# Patient Record
Sex: FEMALE | Race: WHITE | NOT HISPANIC OR LATINO | Employment: OTHER
[De-identification: names, ages, dates, MRNs, and addresses within clinical notes are randomized per-mention and may not be internally consistent; named-entity substitution may affect disease eponyms.]

---

## 2017-08-02 ENCOUNTER — RX ONLY (RX ONLY)
Age: 67
End: 2017-08-02

## 2017-08-02 ENCOUNTER — DOCTOR'S OFFICE (OUTPATIENT)
Dept: URBAN - NONMETROPOLITAN AREA CLINIC 1 | Facility: CLINIC | Age: 67
Setting detail: OPHTHALMOLOGY
End: 2017-08-02
Payer: COMMERCIAL

## 2017-08-02 DIAGNOSIS — H25.013: ICD-10-CM

## 2017-08-02 DIAGNOSIS — H04.121: ICD-10-CM

## 2017-08-02 DIAGNOSIS — H04.122: ICD-10-CM

## 2017-08-02 DIAGNOSIS — H04.123: ICD-10-CM

## 2017-08-02 PROCEDURE — 92004 COMPRE OPH EXAM NEW PT 1/>: CPT | Performed by: OPHTHALMOLOGY

## 2017-08-02 PROCEDURE — 83861 MICROFLUID ANALY TEARS: CPT | Performed by: OPHTHALMOLOGY

## 2017-08-02 ASSESSMENT — REFRACTION_CURRENTRX
OD_CYLINDER: -1.50
OS_SPHERE: +0.75
OS_CYLINDER: SPH
OD_OVR_VA: 20/
OD_SPHERE: +0.50
OD_OVR_VA: 20/
OD_AXIS: 175
OS_ADD: +2.75
OS_OVR_VA: 20/
OD_ADD: +2.75
OD_VPRISM_DIRECTION: TRF
OD_OVR_VA: 20/
OS_VPRISM_DIRECTION: TRF

## 2017-08-02 ASSESSMENT — REFRACTION_MANIFEST
OS_VA1: 20/
OS_VA3: 20/
OD_VA2: 20/
OD_VA3: 20/
OD_VA3: 20/
OD_VA1: 20/
OD_VA2: 20/
OD_VA2: 20/
OU_VA: 20/
OU_VA: 20/
OD_VA1: 20/
OS_VA2: 20/
OS_VA2: 20/
OS_VA1: 20/
OS_VA3: 20/
OS_VA2: 20/
OS_VA3: 20/
OD_VA3: 20/
OS_VA1: 20/
OU_VA: 20/
OD_VA1: 20/

## 2017-08-02 ASSESSMENT — SPHEQUIV_DERIVED: OS_SPHEQUIV: 0.125

## 2017-08-02 ASSESSMENT — REFRACTION_AUTOREFRACTION
OS_AXIS: 014
OS_CYLINDER: -0.75
OD_SPHERE: PLANO
OD_AXIS: 009
OD_CYLINDER: -2.25
OS_SPHERE: +0.50

## 2017-08-02 ASSESSMENT — VISUAL ACUITY
OS_BCVA: 20/40+2
OD_BCVA: 20/30

## 2017-08-02 ASSESSMENT — SUPERFICIAL PUNCTATE KERATITIS (SPK)
OD_SPK: T
OS_SPK: T

## 2017-08-02 ASSESSMENT — CONFRONTATIONAL VISUAL FIELD TEST (CVF)
OS_FINDINGS: FULL
OD_FINDINGS: FULL

## 2017-08-28 ENCOUNTER — DOCTOR'S OFFICE (OUTPATIENT)
Dept: URBAN - METROPOLITAN AREA CLINIC 136 | Facility: CLINIC | Age: 67
Setting detail: OPHTHALMOLOGY
End: 2017-08-28

## 2017-08-28 ENCOUNTER — DOCTOR'S OFFICE (OUTPATIENT)
Dept: URBAN - NONMETROPOLITAN AREA CLINIC 1 | Facility: CLINIC | Age: 67
Setting detail: OPHTHALMOLOGY
End: 2017-08-28
Payer: COMMERCIAL

## 2017-08-28 DIAGNOSIS — H52.4: ICD-10-CM

## 2017-08-28 DIAGNOSIS — H25.012: ICD-10-CM

## 2017-08-28 PROCEDURE — CATARACT K CATARACT KIT: Performed by: OPHTHALMOLOGY

## 2017-08-28 PROCEDURE — 92136 OPHTHALMIC BIOMETRY: CPT | Performed by: OPHTHALMOLOGY

## 2017-09-07 ENCOUNTER — AMBUL SURGICAL CARE (OUTPATIENT)
Dept: URBAN - NONMETROPOLITAN AREA SURGERY 1 | Facility: SURGERY | Age: 67
Setting detail: OPHTHALMOLOGY
End: 2017-09-07
Payer: COMMERCIAL

## 2017-09-07 DIAGNOSIS — H25.012: ICD-10-CM

## 2017-09-07 DIAGNOSIS — H25.042: ICD-10-CM

## 2017-09-07 PROCEDURE — 66984 XCAPSL CTRC RMVL W/O ECP: CPT | Performed by: OPHTHALMOLOGY

## 2017-09-07 PROCEDURE — G8918 PT W/O PREOP ORDER IV AB PRO: HCPCS | Performed by: OPHTHALMOLOGY

## 2017-09-07 PROCEDURE — G8907 PT DOC NO EVENTS ON DISCHARG: HCPCS | Performed by: OPHTHALMOLOGY

## 2017-09-08 ENCOUNTER — RX ONLY (RX ONLY)
Age: 67
End: 2017-09-08

## 2017-09-08 ENCOUNTER — DOCTOR'S OFFICE (OUTPATIENT)
Dept: URBAN - NONMETROPOLITAN AREA CLINIC 1 | Facility: CLINIC | Age: 67
Setting detail: OPHTHALMOLOGY
End: 2017-09-08
Payer: COMMERCIAL

## 2017-09-08 DIAGNOSIS — H25.011: ICD-10-CM

## 2017-09-08 PROCEDURE — 99024 POSTOP FOLLOW-UP VISIT: CPT | Performed by: OPHTHALMOLOGY

## 2017-09-08 PROCEDURE — 92136 OPHTHALMIC BIOMETRY: CPT | Performed by: OPHTHALMOLOGY

## 2017-09-08 ASSESSMENT — REFRACTION_CURRENTRX
OD_OVR_VA: 20/
OD_CYLINDER: -1.50
OD_OVR_VA: 20/
OS_OVR_VA: 20/
OS_ADD: +2.75
OS_SPHERE: +0.75
OS_OVR_VA: 20/
OD_OVR_VA: 20/
OD_ADD: +2.75
OS_VPRISM_DIRECTION: TRF
OD_SPHERE: +0.50
OD_VPRISM_DIRECTION: TRF
OS_OVR_VA: 20/
OD_AXIS: 175
OS_CYLINDER: SPH

## 2017-09-08 ASSESSMENT — REFRACTION_MANIFEST
OS_VA1: 20/
OD_VA2: 20/
OS_VA1: 20/
OU_VA: 20/
OD_VA3: 20/
OU_VA: 20/
OD_VA2: 20/
OD_VA3: 20/
OS_VA3: 20/
OD_VA1: 20/
OS_VA2: 20/
OD_VA1: 20/
OS_VA2: 20/
OS_VA3: 20/
OS_VA3: 20/
OS_VA1: 20/
OU_VA: 20/
OD_VA2: 20/
OS_VA2: 20/
OD_VA3: 20/
OD_VA1: 20/

## 2017-09-08 ASSESSMENT — REFRACTION_AUTOREFRACTION
OS_SPHERE: +0.75
OS_AXIS: 016
OD_CYLINDER: -2.25
OD_SPHERE: PLANO
OS_CYLINDER: -2.00
OD_AXIS: 009

## 2017-09-08 ASSESSMENT — SUPERFICIAL PUNCTATE KERATITIS (SPK)
OD_SPK: T
OS_SPK: T

## 2017-09-08 ASSESSMENT — SPHEQUIV_DERIVED: OS_SPHEQUIV: -0.25

## 2017-09-08 ASSESSMENT — VISUAL ACUITY
OD_BCVA: 20/50+2
OS_BCVA: 20/40+2

## 2017-09-14 ENCOUNTER — AMBUL SURGICAL CARE (OUTPATIENT)
Dept: URBAN - NONMETROPOLITAN AREA SURGERY 1 | Facility: SURGERY | Age: 67
Setting detail: OPHTHALMOLOGY
End: 2017-09-14
Payer: COMMERCIAL

## 2017-09-14 DIAGNOSIS — H25.041: ICD-10-CM

## 2017-09-14 DIAGNOSIS — H25.011: ICD-10-CM

## 2017-09-14 PROCEDURE — G8907 PT DOC NO EVENTS ON DISCHARG: HCPCS | Performed by: OPHTHALMOLOGY

## 2017-09-14 PROCEDURE — 66984 XCAPSL CTRC RMVL W/O ECP: CPT | Performed by: OPHTHALMOLOGY

## 2017-09-14 PROCEDURE — G8918 PT W/O PREOP ORDER IV AB PRO: HCPCS | Performed by: OPHTHALMOLOGY

## 2017-09-15 ENCOUNTER — DOCTOR'S OFFICE (OUTPATIENT)
Dept: URBAN - NONMETROPOLITAN AREA CLINIC 1 | Facility: CLINIC | Age: 67
Setting detail: OPHTHALMOLOGY
End: 2017-09-15

## 2017-09-15 DIAGNOSIS — Z96.1: ICD-10-CM

## 2017-09-15 PROBLEM — H25.011 CATARACT CORTICAL SENILE; RIGHT EYE: Status: RESOLVED | Noted: 2017-09-08 | Resolved: 2017-09-15

## 2017-09-15 PROCEDURE — 99024 POSTOP FOLLOW-UP VISIT: CPT | Performed by: OPTOMETRIST

## 2017-09-15 ASSESSMENT — REFRACTION_MANIFEST
OD_VA3: 20/
OU_VA: 20/
OU_VA: 20/
OD_VA2: 20/
OS_VA3: 20/
OD_VA3: 20/
OS_VA2: 20/
OS_VA1: 20/
OD_VA2: 20/
OD_VA3: 20/
OD_VA1: 20/
OD_VA1: 20/
OD_VA2: 20/
OS_VA1: 20/
OD_VA1: 20/
OS_VA2: 20/
OS_VA1: 20/
OU_VA: 20/
OS_VA3: 20/
OS_VA3: 20/
OS_VA2: 20/

## 2017-09-15 ASSESSMENT — REFRACTION_AUTOREFRACTION
OS_AXIS: 018
OS_SPHERE: +0.25
OD_CYLINDER: -2.50
OS_CYLINDER: -1.00
OD_AXIS: 180
OD_SPHERE: +0.25

## 2017-09-15 ASSESSMENT — SUPERFICIAL PUNCTATE KERATITIS (SPK)
OS_SPK: T
OD_SPK: T

## 2017-09-15 ASSESSMENT — REFRACTION_CURRENTRX
OD_OVR_VA: 20/
OS_VPRISM_DIRECTION: TRF
OD_OVR_VA: 20/
OS_OVR_VA: 20/
OS_OVR_VA: 20/
OD_ADD: +2.75
OD_AXIS: 175
OS_SPHERE: +0.75
OS_CYLINDER: SPH
OD_VPRISM_DIRECTION: TRF
OD_OVR_VA: 20/
OD_CYLINDER: -1.50
OS_OVR_VA: 20/
OD_SPHERE: +0.50
OS_ADD: +2.75

## 2017-09-15 ASSESSMENT — SPHEQUIV_DERIVED
OD_SPHEQUIV: -1
OS_SPHEQUIV: -0.25

## 2017-09-15 ASSESSMENT — VISUAL ACUITY
OS_BCVA: 20/30+2
OD_BCVA: 20/40+2

## 2017-09-22 ENCOUNTER — DOCTOR'S OFFICE (OUTPATIENT)
Dept: URBAN - NONMETROPOLITAN AREA CLINIC 1 | Facility: CLINIC | Age: 67
Setting detail: OPHTHALMOLOGY
End: 2017-09-22

## 2017-09-22 DIAGNOSIS — Z96.1: ICD-10-CM

## 2017-09-22 PROCEDURE — 99024 POSTOP FOLLOW-UP VISIT: CPT | Performed by: OPTOMETRIST

## 2017-09-22 ASSESSMENT — REFRACTION_MANIFEST
OS_VA1: 20/
OD_VA1: 20/
OD_VA2: 20/
OD_VA3: 20/
OD_VA1: 20/
OS_VA3: 20/
OD_VA2: 20/
OD_VA3: 20/
OS_VA2: 20/
OU_VA: 20/
OD_VA1: 20/
OD_VA2: 20/
OS_VA1: 20/
OD_VA3: 20/
OS_VA3: 20/
OS_VA3: 20/
OU_VA: 20/
OU_VA: 20/
OS_VA2: 20/
OS_VA2: 20/
OS_VA1: 20/

## 2017-09-22 ASSESSMENT — REFRACTION_CURRENTRX
OS_VPRISM_DIRECTION: TRF
OS_OVR_VA: 20/
OD_AXIS: 175
OD_OVR_VA: 20/
OD_OVR_VA: 20/
OS_ADD: +2.75
OD_OVR_VA: 20/
OD_CYLINDER: -1.50
OS_SPHERE: +0.75
OS_OVR_VA: 20/
OS_CYLINDER: 0.00
OD_SPHERE: +0.50
OS_AXIS: 180
OS_OVR_VA: 20/
OD_ADD: +2.75
OD_VPRISM_DIRECTION: TRF

## 2017-09-22 ASSESSMENT — VISUAL ACUITY
OD_BCVA: 20/30-2
OS_BCVA: 20/25-2

## 2017-09-22 ASSESSMENT — REFRACTION_AUTOREFRACTION
OS_SPHERE: 0.00
OD_SPHERE: 0.00
OD_AXIS: 176
OD_CYLINDER: -2.00
OS_CYLINDER: -1.00
OS_AXIS: 21

## 2017-09-22 ASSESSMENT — SUPERFICIAL PUNCTATE KERATITIS (SPK)
OD_SPK: T
OS_SPK: T

## 2017-09-22 ASSESSMENT — SPHEQUIV_DERIVED
OS_SPHEQUIV: -0.5
OD_SPHEQUIV: -1

## 2017-10-20 ENCOUNTER — DOCTOR'S OFFICE (OUTPATIENT)
Dept: URBAN - NONMETROPOLITAN AREA CLINIC 1 | Facility: CLINIC | Age: 67
Setting detail: OPHTHALMOLOGY
End: 2017-10-20
Payer: COMMERCIAL

## 2017-10-20 ENCOUNTER — RX ONLY (RX ONLY)
Age: 67
End: 2017-10-20

## 2017-10-20 DIAGNOSIS — Z96.1: ICD-10-CM

## 2017-10-20 PROCEDURE — 99024 POSTOP FOLLOW-UP VISIT: CPT | Performed by: OPTOMETRIST

## 2017-10-20 PROCEDURE — 92015 DETERMINE REFRACTIVE STATE: CPT | Performed by: OPTOMETRIST

## 2017-10-20 ASSESSMENT — REFRACTION_OUTSIDERX
OS_VA2: 20/25
OS_SPHERE: PL
OD_ADD: +2.50
OU_VA: 20/20
OD_SPHERE: PL
OD_VA3: 20/
OD_CYLINDER: -0.75
OD_AXIS: 180
OS_VA3: 20/
OS_VA1: 20/20
OD_VA2: 20/25
OS_AXIS: 025
OS_CYLINDER: -0.75
OD_VA1: 20/20
OS_ADD: +2.50

## 2017-10-20 ASSESSMENT — REFRACTION_CURRENTRX
OS_OVR_VA: 20/
OS_ADD: +2.75
OD_SPHERE: +0.50
OD_ADD: +2.75
OD_OVR_VA: 20/
OD_VPRISM_DIRECTION: TRF
OS_SPHERE: +0.75
OD_OVR_VA: 20/
OS_CYLINDER: 0.00
OS_AXIS: 180
OD_OVR_VA: 20/
OS_VPRISM_DIRECTION: TRF
OD_CYLINDER: -1.50
OD_AXIS: 175
OS_OVR_VA: 20/
OS_OVR_VA: 20/

## 2017-10-20 ASSESSMENT — REFRACTION_MANIFEST
OS_VA1: 20/
OS_VA3: 20/
OD_VA2: 20/
OS_VA3: 20/
OD_VA1: 20/
OD_VA2: 20/
OS_VA2: 20/
OD_VA3: 20/
OD_VA3: 20/
OU_VA: 20/
OU_VA: 20/
OS_VA1: 20/
OS_VA2: 20/
OD_VA1: 20/

## 2017-10-20 ASSESSMENT — SUPERFICIAL PUNCTATE KERATITIS (SPK)
OD_SPK: T
OS_SPK: T

## 2017-10-20 ASSESSMENT — SPHEQUIV_DERIVED
OD_SPHEQUIV: -0.5
OS_SPHEQUIV: -0.125

## 2017-10-20 ASSESSMENT — REFRACTION_AUTOREFRACTION
OS_CYLINDER: -0.75
OS_AXIS: 024
OD_AXIS: 176
OS_SPHERE: +0.25
OD_CYLINDER: -1.50
OD_SPHERE: +0.25

## 2017-10-20 ASSESSMENT — VISUAL ACUITY
OD_BCVA: 20/30+2
OS_BCVA: 20/25+2

## 2017-11-06 ENCOUNTER — DOCTOR'S OFFICE (OUTPATIENT)
Dept: URBAN - METROPOLITAN AREA CLINIC 136 | Facility: CLINIC | Age: 67
Setting detail: OPHTHALMOLOGY
End: 2017-11-06
Payer: COMMERCIAL

## 2017-11-06 DIAGNOSIS — H43.812: ICD-10-CM

## 2017-11-06 DIAGNOSIS — H53.19: ICD-10-CM

## 2017-11-06 DIAGNOSIS — H04.123: ICD-10-CM

## 2017-11-06 DIAGNOSIS — Z96.1: ICD-10-CM

## 2017-11-06 PROCEDURE — 92225 OPHTHALMOSCOPY EXTENDED INITIAL: CPT | Performed by: OPHTHALMOLOGY

## 2017-11-06 PROCEDURE — 92012 INTRM OPH EXAM EST PATIENT: CPT | Performed by: OPHTHALMOLOGY

## 2017-11-06 ASSESSMENT — REFRACTION_OUTSIDERX
OS_SPHERE: PL
OU_VA: 20/20
OS_AXIS: 025
OS_CYLINDER: -0.75
OD_ADD: +2.50
OS_VA3: 20/
OD_SPHERE: PL
OS_ADD: +2.50
OD_VA3: 20/
OD_VA2: 20/25
OS_VA1: 20/20
OS_VA2: 20/25
OD_VA1: 20/20
OD_AXIS: 180
OD_CYLINDER: -0.75

## 2017-11-06 ASSESSMENT — REFRACTION_AUTOREFRACTION
OD_AXIS: 176
OS_CYLINDER: -0.75
OS_SPHERE: +0.25
OD_SPHERE: +0.25
OS_AXIS: 024
OD_CYLINDER: -1.50

## 2017-11-06 ASSESSMENT — REFRACTION_CURRENTRX
OD_VPRISM_DIRECTION: TRF
OD_OVR_VA: 20/
OS_OVR_VA: 20/
OD_SPHERE: +0.50
OS_VPRISM_DIRECTION: TRF
OS_OVR_VA: 20/
OS_ADD: +2.75
OD_OVR_VA: 20/
OD_CYLINDER: -1.50
OS_SPHERE: +0.75
OD_AXIS: 175
OS_AXIS: 180
OS_OVR_VA: 20/
OD_ADD: +2.75
OD_OVR_VA: 20/
OS_CYLINDER: 0.00

## 2017-11-06 ASSESSMENT — REFRACTION_MANIFEST
OD_VA2: 20/
OD_VA3: 20/
OS_VA1: 20/
OS_VA2: 20/
OD_VA2: 20/
OS_VA3: 20/
OS_VA3: 20/
OU_VA: 20/
OS_VA2: 20/
OD_VA1: 20/
OU_VA: 20/
OD_VA1: 20/
OS_VA1: 20/
OD_VA3: 20/

## 2017-11-06 ASSESSMENT — CONFRONTATIONAL VISUAL FIELD TEST (CVF)
OS_FINDINGS: FULL
OD_FINDINGS: FULL

## 2017-11-06 ASSESSMENT — VISUAL ACUITY
OS_BCVA: 20/25+2
OD_BCVA: 20/30+2

## 2017-11-06 ASSESSMENT — SUPERFICIAL PUNCTATE KERATITIS (SPK)
OD_SPK: T
OS_SPK: T

## 2017-11-06 ASSESSMENT — SPHEQUIV_DERIVED
OD_SPHEQUIV: -0.5
OS_SPHEQUIV: -0.125

## 2018-01-31 ENCOUNTER — DOCTOR'S OFFICE (OUTPATIENT)
Dept: URBAN - NONMETROPOLITAN AREA CLINIC 1 | Facility: CLINIC | Age: 68
Setting detail: OPHTHALMOLOGY
End: 2018-01-31
Payer: COMMERCIAL

## 2018-01-31 DIAGNOSIS — H43.812: ICD-10-CM

## 2018-01-31 DIAGNOSIS — Z96.1: ICD-10-CM

## 2018-01-31 DIAGNOSIS — H04.123: ICD-10-CM

## 2018-01-31 DIAGNOSIS — H53.19: ICD-10-CM

## 2018-01-31 PROCEDURE — 92014 COMPRE OPH EXAM EST PT 1/>: CPT | Performed by: OPHTHALMOLOGY

## 2018-01-31 ASSESSMENT — REFRACTION_MANIFEST
OU_VA: 20/
OD_VA2: 20/
OD_VA2: 20/
OU_VA: 20/
OD_VA1: 20/
OS_VA1: 20/
OS_VA3: 20/
OS_VA2: 20/
OD_VA3: 20/
OS_VA1: 20/
OD_VA3: 20/
OD_VA1: 20/
OS_VA2: 20/
OS_VA3: 20/

## 2018-01-31 ASSESSMENT — VISUAL ACUITY
OS_BCVA: 20/25
OD_BCVA: 20/25+2

## 2018-01-31 ASSESSMENT — REFRACTION_AUTOREFRACTION
OS_CYLINDER: -1.50
OS_SPHERE: +0.75
OD_AXIS: 009
OS_AXIS: 013
OD_CYLINDER: -2.00
OD_SPHERE: +0.50

## 2018-01-31 ASSESSMENT — REFRACTION_CURRENTRX
OD_ADD: +2.75
OS_OVR_VA: 20/
OS_AXIS: 180
OD_OVR_VA: 20/
OD_SPHERE: +0.50
OS_ADD: +2.75
OD_OVR_VA: 20/
OD_OVR_VA: 20/
OS_CYLINDER: 0.00
OS_VPRISM_DIRECTION: TRF
OS_SPHERE: +0.75
OD_AXIS: 175
OS_OVR_VA: 20/
OD_VPRISM_DIRECTION: TRF
OD_CYLINDER: -1.50
OS_OVR_VA: 20/

## 2018-01-31 ASSESSMENT — REFRACTION_OUTSIDERX
OS_VA1: 20/20
OD_VA1: 20/20
OS_AXIS: 025
OU_VA: 20/20
OD_CYLINDER: -0.75
OS_VA2: 20/25
OS_VA3: 20/
OS_SPHERE: PL
OD_SPHERE: PL
OS_CYLINDER: -0.75
OD_VA3: 20/
OD_ADD: +2.50
OD_AXIS: 180
OD_VA2: 20/25
OS_ADD: +2.50

## 2018-01-31 ASSESSMENT — SPHEQUIV_DERIVED
OS_SPHEQUIV: 0
OD_SPHEQUIV: -0.5

## 2018-01-31 ASSESSMENT — SUPERFICIAL PUNCTATE KERATITIS (SPK)
OD_SPK: T
OS_SPK: T

## 2018-01-31 ASSESSMENT — CONFRONTATIONAL VISUAL FIELD TEST (CVF)
OD_FINDINGS: FULL
OS_FINDINGS: FULL

## 2019-03-05 ENCOUNTER — RX ONLY (RX ONLY)
Age: 69
End: 2019-03-05

## 2019-03-05 ENCOUNTER — DOCTOR'S OFFICE (OUTPATIENT)
Dept: URBAN - NONMETROPOLITAN AREA CLINIC 1 | Facility: CLINIC | Age: 69
Setting detail: OPHTHALMOLOGY
End: 2019-03-05
Payer: COMMERCIAL

## 2019-03-05 DIAGNOSIS — Z96.1: ICD-10-CM

## 2019-03-05 DIAGNOSIS — H43.812: ICD-10-CM

## 2019-03-05 DIAGNOSIS — H04.121: ICD-10-CM

## 2019-03-05 DIAGNOSIS — H04.122: ICD-10-CM

## 2019-03-05 PROBLEM — H53.19 PHOTOPSIA ; LEFT EYE: Status: RESOLVED | Noted: 2017-11-06 | Resolved: 2019-03-05

## 2019-03-05 PROCEDURE — 83861 MICROFLUID ANALY TEARS: CPT | Performed by: OPHTHALMOLOGY

## 2019-03-05 PROCEDURE — 92014 COMPRE OPH EXAM EST PT 1/>: CPT | Performed by: OPHTHALMOLOGY

## 2019-03-05 ASSESSMENT — REFRACTION_CURRENTRX
OS_ADD: +2.75
OS_OVR_VA: 20/
OD_AXIS: 009
OD_CYLINDER: -0.75
OS_SPHERE: PLANO
OD_OVR_VA: 20/
OS_VPRISM_DIRECTION: TRF
OD_SPHERE: PLANO
OS_OVR_VA: 20/
OS_CYLINDER: -0.75
OS_OVR_VA: 20/
OS_AXIS: 026
OD_VPRISM_DIRECTION: TRF
OD_ADD: +2.75
OD_OVR_VA: 20/
OD_OVR_VA: 20/

## 2019-03-05 ASSESSMENT — REFRACTION_MANIFEST
OS_ADD: +2.50
OD_VA3: 20/
OS_VA2: 20/
OS_CYLINDER: -0.75
OS_VA3: 20/
OD_ADD: +2.50
OS_VA1: 20/
OD_VA1: 20/
OD_VA2: 20/25
OS_VA3: 20/
OS_AXIS: 025
OU_VA: 20/
OD_VA3: 20/
OS_VA2: 20/25
OD_VA2: 20/
OD_SPHERE: PL
OS_VA1: 20/20
OD_CYLINDER: -0.75
OS_SPHERE: PL
OU_VA: 20/20
OD_VA1: 20/20
OD_AXIS: 180

## 2019-03-05 ASSESSMENT — VISUAL ACUITY
OD_BCVA: 20/25-2
OS_BCVA: 20/30

## 2019-03-05 ASSESSMENT — SPHEQUIV_DERIVED
OD_SPHEQUIV: 0.375
OS_SPHEQUIV: 0.25

## 2019-03-05 ASSESSMENT — REFRACTION_AUTOREFRACTION
OS_AXIS: 011
OS_CYLINDER: -0.50
OD_AXIS: 176
OS_SPHERE: +0.50
OD_CYLINDER: -1.75
OD_SPHERE: +1.25

## 2019-03-05 ASSESSMENT — CONFRONTATIONAL VISUAL FIELD TEST (CVF)
OS_FINDINGS: FULL
OD_FINDINGS: FULL

## 2019-03-05 ASSESSMENT — SUPERFICIAL PUNCTATE KERATITIS (SPK)
OS_SPK: T
OD_SPK: T

## 2019-04-15 ENCOUNTER — DOCTOR'S OFFICE (OUTPATIENT)
Dept: URBAN - NONMETROPOLITAN AREA CLINIC 1 | Facility: CLINIC | Age: 69
Setting detail: OPHTHALMOLOGY
End: 2019-04-15
Payer: COMMERCIAL

## 2019-04-15 DIAGNOSIS — H04.122: ICD-10-CM

## 2019-04-15 DIAGNOSIS — H04.121: ICD-10-CM

## 2019-04-15 PROCEDURE — 99214 OFFICE O/P EST MOD 30 MIN: CPT | Performed by: PHYSICIAN ASSISTANT

## 2019-04-15 PROCEDURE — 83861 MICROFLUID ANALY TEARS: CPT | Performed by: PHYSICIAN ASSISTANT

## 2019-04-15 ASSESSMENT — SUPERFICIAL PUNCTATE KERATITIS (SPK)
OD_SPK: ABSENT
OS_SPK: T

## 2019-04-15 ASSESSMENT — LID EXAM ASSESSMENTS
OS_BLEPHARITIS: ABSENT
OD_BLEPHARITIS: ABSENT

## 2019-04-15 ASSESSMENT — DRY EYES - PHYSICIAN NOTES: OS_GENERALCOMMENTS: VERY TRACE, INFERIOR

## 2019-04-16 ASSESSMENT — REFRACTION_CURRENTRX
OD_CYLINDER: -0.75
OS_VPRISM_DIRECTION: TRF
OD_OVR_VA: 20/
OD_OVR_VA: 20/
OS_AXIS: 026
OS_SPHERE: PLANO
OS_ADD: +2.75
OS_CYLINDER: -0.75
OD_ADD: +2.75
OD_SPHERE: PLANO
OS_OVR_VA: 20/
OD_OVR_VA: 20/
OD_AXIS: 009
OS_OVR_VA: 20/
OS_OVR_VA: 20/
OD_VPRISM_DIRECTION: TRF

## 2019-04-16 ASSESSMENT — REFRACTION_AUTOREFRACTION
OS_AXIS: 011
OD_CYLINDER: -1.75
OS_SPHERE: +0.50
OS_CYLINDER: -0.50
OD_AXIS: 176
OD_SPHERE: +1.25

## 2019-04-16 ASSESSMENT — REFRACTION_MANIFEST
OD_AXIS: 180
OU_VA: 20/20
OD_VA1: 20/
OD_SPHERE: PL
OS_SPHERE: PL
OU_VA: 20/
OS_VA3: 20/
OS_VA2: 20/25
OS_VA2: 20/
OS_VA1: 20/
OD_ADD: +2.50
OD_VA3: 20/
OS_VA3: 20/
OD_VA3: 20/
OS_ADD: +2.50
OS_AXIS: 025
OS_CYLINDER: -0.75
OD_VA2: 20/25
OD_CYLINDER: -0.75
OD_VA1: 20/20
OD_VA2: 20/
OS_VA1: 20/20

## 2019-04-16 ASSESSMENT — SPHEQUIV_DERIVED
OS_SPHEQUIV: 0.25
OD_SPHEQUIV: 0.375

## 2019-04-16 ASSESSMENT — VISUAL ACUITY
OS_BCVA: 20/25-2
OD_BCVA: 20/25-1

## 2019-07-02 ENCOUNTER — DOCTOR'S OFFICE (OUTPATIENT)
Dept: URBAN - NONMETROPOLITAN AREA CLINIC 1 | Facility: CLINIC | Age: 69
Setting detail: OPHTHALMOLOGY
End: 2019-07-02

## 2019-07-02 DIAGNOSIS — H52.4: ICD-10-CM

## 2019-07-02 DIAGNOSIS — Z96.1: ICD-10-CM

## 2019-07-02 PROCEDURE — 92015 DETERMINE REFRACTIVE STATE: CPT | Performed by: OPTOMETRIST

## 2019-07-02 ASSESSMENT — REFRACTION_MANIFEST
OS_VA3: 20/
OD_VA2: 20/
OD_VA3: 20/
OS_VA3: 20/
OU_VA: 20/20
OD_VA1: 20/20
OU_VA: 20/
OD_ADD: +2.50
OS_VA2: 20/
OS_VA2: 20/25
OD_AXIS: 180
OD_VA1: 20/
OD_SPHERE: +0.25
OS_ADD: +2.50
OD_VA3: 20/
OS_VA1: 20/20
OD_CYLINDER: -1.25
OS_SPHERE: +0.25
OS_CYLINDER: -1.00
OS_AXIS: 025
OS_VA1: 20/
OD_VA2: 20/25

## 2019-07-02 ASSESSMENT — REFRACTION_AUTOREFRACTION
OS_AXIS: 030
OD_SPHERE: +0.75
OD_AXIS: 178
OD_CYLINDER: -1.50
OS_CYLINDER: -1.25
OS_SPHERE: +0.75

## 2019-07-02 ASSESSMENT — REFRACTION_CURRENTRX
OD_CYLINDER: -0.75
OD_VPRISM_DIRECTION: TRF
OD_AXIS: 180
OD_OVR_VA: 20/
OD_OVR_VA: 20/
OS_OVR_VA: 20/
OD_SPHERE: PLANO
OD_ADD: +2.50
OS_SPHERE: PLANO
OS_VPRISM_DIRECTION: TRF
OS_ADD: +2.50
OD_OVR_VA: 20/
OS_AXIS: 026
OS_CYLINDER: -0.75

## 2019-07-02 ASSESSMENT — VISUAL ACUITY
OS_BCVA: 20/25
OD_BCVA: 20/25

## 2019-07-02 ASSESSMENT — SPHEQUIV_DERIVED
OD_SPHEQUIV: -0.375
OS_SPHEQUIV: 0.125
OS_SPHEQUIV: -0.25
OD_SPHEQUIV: 0

## 2019-07-08 ENCOUNTER — OPTICAL (OUTPATIENT)
Dept: URBAN - NONMETROPOLITAN AREA CLINIC 6 | Facility: CLINIC | Age: 69
Setting detail: OPHTHALMOLOGY
End: 2019-07-08
Payer: COMMERCIAL

## 2019-07-08 DIAGNOSIS — Z96.1: ICD-10-CM

## 2019-07-08 PROCEDURE — V2303 LENS SPHCY TRIFOCAL 4.0/.12-: HCPCS | Performed by: OPTOMETRIST

## 2019-07-08 PROCEDURE — V2020 VISION SVCS FRAMES PURCHASES: HCPCS | Performed by: OPTOMETRIST

## 2019-07-23 ENCOUNTER — RX ONLY (RX ONLY)
Age: 69
End: 2019-07-23

## 2019-07-23 ENCOUNTER — DOCTOR'S OFFICE (OUTPATIENT)
Dept: URBAN - NONMETROPOLITAN AREA CLINIC 1 | Facility: CLINIC | Age: 69
Setting detail: OPHTHALMOLOGY
End: 2019-07-23
Payer: COMMERCIAL

## 2019-07-23 DIAGNOSIS — H04.121: ICD-10-CM

## 2019-07-23 DIAGNOSIS — H04.122: ICD-10-CM

## 2019-07-23 DIAGNOSIS — H04.123: ICD-10-CM

## 2019-07-23 PROCEDURE — 83861 MICROFLUID ANALY TEARS: CPT | Performed by: PHYSICIAN ASSISTANT

## 2019-07-23 PROCEDURE — 99213 OFFICE O/P EST LOW 20 MIN: CPT | Performed by: PHYSICIAN ASSISTANT

## 2019-07-23 ASSESSMENT — SUPERFICIAL PUNCTATE KERATITIS (SPK)
OS_SPK: T
OD_SPK: ABSENT

## 2019-07-23 ASSESSMENT — LID EXAM ASSESSMENTS
OD_BLEPHARITIS: ABSENT
OS_BLEPHARITIS: ABSENT

## 2019-07-23 ASSESSMENT — DRY EYES - PHYSICIAN NOTES: OS_GENERALCOMMENTS: VERY TRACE, INFERIOR

## 2019-07-24 ASSESSMENT — REFRACTION_CURRENTRX
OS_OVR_VA: 20/
OS_OVR_VA: 20/
OD_VPRISM_DIRECTION: TRF
OD_OVR_VA: 20/
OS_SPHERE: PLANO
OD_SPHERE: PLANO
OD_OVR_VA: 20/
OD_CYLINDER: -0.75
OS_CYLINDER: -0.75
OD_OVR_VA: 20/
OD_AXIS: 180
OS_OVR_VA: 20/
OS_AXIS: 026
OS_ADD: +2.50
OD_ADD: +2.50
OS_VPRISM_DIRECTION: TRF

## 2019-07-24 ASSESSMENT — REFRACTION_MANIFEST
OS_CYLINDER: -1.00
OS_VA1: 20/20
OS_VA2: 20/
OU_VA: 20/
OS_ADD: +2.50
OD_VA2: 20/25
OD_ADD: +2.50
OS_AXIS: 025
OD_AXIS: 180
OD_VA3: 20/
OD_VA2: 20/
OS_VA3: 20/
OS_VA2: 20/25
OD_SPHERE: +0.25
OD_CYLINDER: -1.25
OS_SPHERE: +0.25
OD_VA1: 20/
OD_VA1: 20/20
OD_VA3: 20/
OS_VA3: 20/
OS_VA1: 20/
OU_VA: 20/20

## 2019-07-24 ASSESSMENT — REFRACTION_AUTOREFRACTION
OS_CYLINDER: -1.25
OD_AXIS: 178
OD_CYLINDER: -1.50
OD_SPHERE: +0.75
OS_AXIS: 030
OS_SPHERE: +0.75

## 2019-07-24 ASSESSMENT — VISUAL ACUITY
OD_BCVA: 20/25+2
OS_BCVA: 20/20-2

## 2019-07-24 ASSESSMENT — SPHEQUIV_DERIVED
OD_SPHEQUIV: 0
OS_SPHEQUIV: 0.125
OS_SPHEQUIV: -0.25
OD_SPHEQUIV: -0.375

## 2019-10-22 ENCOUNTER — DOCTOR'S OFFICE (OUTPATIENT)
Dept: URBAN - NONMETROPOLITAN AREA CLINIC 1 | Facility: CLINIC | Age: 69
Setting detail: OPHTHALMOLOGY
End: 2019-10-22
Payer: COMMERCIAL

## 2019-10-22 DIAGNOSIS — H04.121: ICD-10-CM

## 2019-10-22 DIAGNOSIS — H04.122: ICD-10-CM

## 2019-10-22 PROCEDURE — 92012 INTRM OPH EXAM EST PATIENT: CPT | Performed by: PHYSICIAN ASSISTANT

## 2019-10-22 PROCEDURE — 68761 CLOSE TEAR DUCT OPENING: CPT | Performed by: PHYSICIAN ASSISTANT

## 2019-10-22 ASSESSMENT — CONFRONTATIONAL VISUAL FIELD TEST (CVF)
OS_FINDINGS: FULL
OD_FINDINGS: FULL

## 2019-10-22 ASSESSMENT — LID EXAM ASSESSMENTS
OD_BLEPHARITIS: T
OS_BLEPHARITIS: T

## 2019-10-22 ASSESSMENT — SUPERFICIAL PUNCTATE KERATITIS (SPK)
OD_SPK: ABSENT
OS_SPK: T

## 2019-10-22 ASSESSMENT — PUNCTA - ASSESSMENT
OD_PUNCTA: COL PLUG RLL RUL SMALL
OS_PUNCTA: COL PLUG LLL LUL SMALL

## 2019-10-23 ASSESSMENT — REFRACTION_CURRENTRX
OS_AXIS: 026
OS_OVR_VA: 20/
OS_OVR_VA: 20/
OD_SPHERE: PLANO
OS_OVR_VA: 20/
OD_CYLINDER: -0.75
OD_AXIS: 180
OD_OVR_VA: 20/
OS_CYLINDER: -0.75
OS_SPHERE: PLANO
OS_ADD: +2.50
OS_VPRISM_DIRECTION: TRF
OD_VPRISM_DIRECTION: TRF
OD_ADD: +2.50

## 2019-10-23 ASSESSMENT — REFRACTION_MANIFEST
OD_VA2: 20/
OS_VA2: 20/25
OD_VA2: 20/25
OS_CYLINDER: -1.00
OD_CYLINDER: -1.25
OD_VA1: 20/
OU_VA: 20/20
OS_VA1: 20/20
OU_VA: 20/
OS_SPHERE: +0.25
OS_AXIS: 025
OS_VA3: 20/
OS_ADD: +2.50
OD_AXIS: 180
OS_VA3: 20/
OD_SPHERE: +0.25
OS_VA1: 20/
OS_VA2: 20/
OD_VA1: 20/20
OD_ADD: +2.50
OD_VA3: 20/
OD_VA3: 20/

## 2019-10-23 ASSESSMENT — SPHEQUIV_DERIVED
OD_SPHEQUIV: -0.125
OS_SPHEQUIV: -0.25
OD_SPHEQUIV: -0.375
OS_SPHEQUIV: 0.125

## 2019-10-23 ASSESSMENT — REFRACTION_AUTOREFRACTION
OD_SPHERE: +0.75
OS_AXIS: 013
OD_AXIS: 179
OD_CYLINDER: -1.75
OS_SPHERE: +0.75
OS_CYLINDER: -1.25

## 2019-10-23 ASSESSMENT — VISUAL ACUITY
OD_BCVA: 20/20-2
OS_BCVA: 20/20-2

## 2019-12-03 ENCOUNTER — OPTICAL OFFICE (OUTPATIENT)
Dept: URBAN - NONMETROPOLITAN AREA CLINIC 4 | Facility: CLINIC | Age: 69
Setting detail: OPHTHALMOLOGY
End: 2019-12-03

## 2019-12-03 ENCOUNTER — DOCTOR'S OFFICE (OUTPATIENT)
Dept: URBAN - NONMETROPOLITAN AREA CLINIC 1 | Facility: CLINIC | Age: 69
Setting detail: OPHTHALMOLOGY
End: 2019-12-03
Payer: COMMERCIAL

## 2019-12-03 DIAGNOSIS — H04.122: ICD-10-CM

## 2019-12-03 DIAGNOSIS — H52.223: ICD-10-CM

## 2019-12-03 DIAGNOSIS — H04.121: ICD-10-CM

## 2019-12-03 PROCEDURE — V2203 LENS SPHCYL BIFOCAL 4.00D/.1: HCPCS | Performed by: OPTOMETRIST

## 2019-12-03 PROCEDURE — 83861 MICROFLUID ANALY TEARS: CPT | Performed by: PHYSICIAN ASSISTANT

## 2019-12-03 PROCEDURE — V2020 VISION SVCS FRAMES PURCHASES: HCPCS | Performed by: OPTOMETRIST

## 2019-12-03 PROCEDURE — 68761 CLOSE TEAR DUCT OPENING: CPT | Performed by: PHYSICIAN ASSISTANT

## 2019-12-03 ASSESSMENT — CONFRONTATIONAL VISUAL FIELD TEST (CVF)
OS_FINDINGS: FULL
OD_FINDINGS: FULL

## 2019-12-04 ASSESSMENT — REFRACTION_MANIFEST
OD_SPHERE: +0.25
OS_VA3: 20/
OD_AXIS: 180
OS_VA2: 20/25
OD_VA1: 20/20
OD_VA2: 20/
OS_ADD: +2.50
OD_VA3: 20/
OD_VA2: 20/25
OS_VA1: 20/20
OD_VA1: 20/
OS_VA2: 20/
OD_VA3: 20/
OS_VA1: 20/
OS_CYLINDER: -1.00
OS_VA3: 20/
OD_CYLINDER: -1.25
OU_VA: 20/
OD_ADD: +2.50
OU_VA: 20/20
OS_SPHERE: +0.25
OS_AXIS: 025

## 2019-12-04 ASSESSMENT — REFRACTION_CURRENTRX
OD_ADD: +2.50
OS_OVR_VA: 20/
OD_OVR_VA: 20/
OS_AXIS: 026
OD_CYLINDER: -0.75
OD_VPRISM_DIRECTION: TRF
OS_OVR_VA: 20/
OS_CYLINDER: -0.75
OS_SPHERE: PLANO
OD_AXIS: 180
OS_VPRISM_DIRECTION: TRF
OD_SPHERE: PLANO
OS_ADD: +2.50
OD_OVR_VA: 20/
OD_OVR_VA: 20/
OS_OVR_VA: 20/

## 2019-12-04 ASSESSMENT — VISUAL ACUITY
OD_BCVA: 20/25-1
OS_BCVA: 20/25

## 2019-12-04 ASSESSMENT — SPHEQUIV_DERIVED
OS_SPHEQUIV: 0.125
OD_SPHEQUIV: -0.125
OS_SPHEQUIV: -0.25
OD_SPHEQUIV: -0.375

## 2019-12-04 ASSESSMENT — REFRACTION_AUTOREFRACTION
OS_AXIS: 013
OS_SPHERE: +0.75
OD_AXIS: 179
OD_SPHERE: +0.75
OS_CYLINDER: -1.25
OD_CYLINDER: -1.75

## 2020-03-11 ENCOUNTER — DOCTOR'S OFFICE (OUTPATIENT)
Dept: URBAN - NONMETROPOLITAN AREA CLINIC 1 | Facility: CLINIC | Age: 70
Setting detail: OPHTHALMOLOGY
End: 2020-03-11
Payer: COMMERCIAL

## 2020-03-11 VITALS — HEIGHT: 55 IN

## 2020-03-11 DIAGNOSIS — H04.122: ICD-10-CM

## 2020-03-11 DIAGNOSIS — H04.121: ICD-10-CM

## 2020-03-11 PROCEDURE — 99214 OFFICE O/P EST MOD 30 MIN: CPT | Performed by: PHYSICIAN ASSISTANT

## 2020-03-11 PROCEDURE — 83861 MICROFLUID ANALY TEARS: CPT | Performed by: PHYSICIAN ASSISTANT

## 2020-03-11 ASSESSMENT — LID EXAM ASSESSMENTS
OS_BLEPHARITIS: T
OD_BLEPHARITIS: T

## 2020-03-11 ASSESSMENT — SUPERFICIAL PUNCTATE KERATITIS (SPK)
OD_SPK: T
OS_SPK: T

## 2020-03-11 ASSESSMENT — PUNCTA - ASSESSMENT
OD_PUNCTA: COL PLUG RLL RUL SMALL
OS_PUNCTA: COL PLUG LLL LUL SMALL

## 2020-03-12 ASSESSMENT — REFRACTION_AUTOREFRACTION
OD_CYLINDER: -1.75
OD_SPHERE: +0.75
OD_AXIS: 179
OS_SPHERE: +0.75
OS_AXIS: 013
OS_CYLINDER: -1.25

## 2020-03-12 ASSESSMENT — REFRACTION_MANIFEST
OD_ADD: +2.50
OD_SPHERE: +0.25
OD_VA2: 20/25
OS_CYLINDER: -1.00
OS_AXIS: 025
OS_ADD: +2.50
OU_VA: 20/20
OD_AXIS: 180
OS_VA1: 20/20
OS_SPHERE: +0.25
OS_VA2: 20/25
OD_VA1: 20/20
OD_CYLINDER: -1.25

## 2020-03-12 ASSESSMENT — SPHEQUIV_DERIVED
OD_SPHEQUIV: -0.125
OS_SPHEQUIV: -0.25
OS_SPHEQUIV: 0.125
OD_SPHEQUIV: -0.375

## 2020-03-12 ASSESSMENT — REFRACTION_CURRENTRX
OD_CYLINDER: -0.75
OS_SPHERE: PLANO
OD_SPHERE: PLANO
OS_AXIS: 026
OS_VPRISM_DIRECTION: TRF
OD_AXIS: 180
OS_CYLINDER: -0.75
OD_ADD: +2.50
OD_OVR_VA: 20/
OS_OVR_VA: 20/
OD_VPRISM_DIRECTION: TRF
OS_ADD: +2.50

## 2020-03-12 ASSESSMENT — VISUAL ACUITY
OD_BCVA: 20/25-1
OS_BCVA: 20/25-1

## 2020-06-16 ENCOUNTER — DOCTOR'S OFFICE (OUTPATIENT)
Dept: URBAN - NONMETROPOLITAN AREA CLINIC 1 | Facility: CLINIC | Age: 70
Setting detail: OPHTHALMOLOGY
End: 2020-06-16
Payer: COMMERCIAL

## 2020-06-16 ENCOUNTER — RX ONLY (RX ONLY)
Age: 70
End: 2020-06-16

## 2020-06-16 VITALS — HEIGHT: 55 IN

## 2020-06-16 DIAGNOSIS — H04.122: ICD-10-CM

## 2020-06-16 DIAGNOSIS — H04.121: ICD-10-CM

## 2020-06-16 PROCEDURE — 92014 COMPRE OPH EXAM EST PT 1/>: CPT | Performed by: OPHTHALMOLOGY

## 2020-06-16 PROCEDURE — 83861 MICROFLUID ANALY TEARS: CPT | Performed by: OPHTHALMOLOGY

## 2020-06-16 ASSESSMENT — REFRACTION_CURRENTRX
OS_ADD: +2.75
OS_VPRISM_DIRECTION: TRF
OS_AXIS: 025
OD_SPHERE: +0.25
OD_VPRISM_DIRECTION: TRF
OD_AXIS: 176
OD_CYLINDER: -1.25
OS_OVR_VA: 20/
OS_CYLINDER: -1.00
OD_ADD: +2.75
OD_OVR_VA: 20/
OS_SPHERE: +0.25

## 2020-06-16 ASSESSMENT — AXIALLENGTH_DERIVED
OD_AL: 22.2979
OS_AL: 22.4607
OD_AL: 22.2979
OS_AL: 22.329

## 2020-06-16 ASSESSMENT — LID EXAM ASSESSMENTS
OS_BLEPHARITIS: ABSENT
OD_BLEPHARITIS: ABSENT

## 2020-06-16 ASSESSMENT — KERATOMETRY
OS_K1POWER_DIOPTERS: 46.50
OD_K2POWER_DIOPTERS: 48.75
OD_K1POWER_DIOPTERS: 46.50
OD_AXISANGLE_DEGREES: 095
OS_AXISANGLE_DEGREES: 103
OS_K2POWER_DIOPTERS: 47.50

## 2020-06-16 ASSESSMENT — REFRACTION_MANIFEST
OD_VA1: 20/20
OU_VA: 20/20
OS_SPHERE: +0.25
OD_CYLINDER: -1.25
OD_ADD: +2.50
OS_CYLINDER: -1.00
OS_VA2: 20/25
OS_AXIS: 025
OS_ADD: +2.50
OD_VA2: 20/25
OS_VA1: 20/20
OD_AXIS: 180
OD_SPHERE: +0.25

## 2020-06-16 ASSESSMENT — VISUAL ACUITY
OS_BCVA: 20/20-1
OD_BCVA: 20/20-3

## 2020-06-16 ASSESSMENT — REFRACTION_AUTOREFRACTION
OD_SPHERE: +0.50
OS_SPHERE: +0.75
OS_CYLINDER: -1.25
OS_AXIS: 007
OD_CYLINDER: -1.75
OD_AXIS: 006

## 2020-06-16 ASSESSMENT — SPHEQUIV_DERIVED
OS_SPHEQUIV: 0.125
OD_SPHEQUIV: -0.375
OD_SPHEQUIV: -0.375
OS_SPHEQUIV: -0.25

## 2020-06-16 ASSESSMENT — PUNCTA - ASSESSMENT
OS_PUNCTA: SIL PLUG LLL SMALL
OD_PUNCTA: SIL PLUG RLL SMALL

## 2020-06-16 ASSESSMENT — SUPERFICIAL PUNCTATE KERATITIS (SPK)
OD_SPK: T
OS_SPK: T

## 2020-06-16 ASSESSMENT — CONFRONTATIONAL VISUAL FIELD TEST (CVF)
OD_FINDINGS: FULL
OS_FINDINGS: FULL

## 2021-06-13 PROBLEM — R73.03 PRE-DIABETES: Status: ACTIVE | Noted: 2020-07-08

## 2021-06-13 PROBLEM — R56.9 GENERALIZED CONVULSIVE SEIZURE (HCC): Status: ACTIVE | Noted: 2020-02-19

## 2021-06-13 PROBLEM — I10 HTN, GOAL BELOW 140/90: Status: ACTIVE | Noted: 2021-01-27

## 2021-06-13 PROBLEM — K21.9 GASTROESOPHAGEAL REFLUX DISEASE: Status: ACTIVE | Noted: 2018-12-24

## 2021-06-13 PROBLEM — Z85.3 PERSONAL HISTORY OF BREAST CANCER: Status: ACTIVE | Noted: 2017-01-19

## 2021-06-13 PROBLEM — F39 MOOD DISORDER (HCC): Status: ACTIVE | Noted: 2019-10-02

## 2021-06-13 NOTE — PROGRESS NOTES
Assessment/Plan:    Personal history of breast cancer  11/15/2013 core biopsy consistent with invasive ductal carcinoma, ER/UT positive, HER-2 positive, Ki-67 low, overall grade 2; sees H/O  C/w to follow with H/O with Dr Eb Almanza at [address] [place] [address] hypertension  Stable  At goal  C/w current medication regimen       Diagnoses and all orders for this visit:    Establishing care with new doctor, encounter for    Encounter for screening mammogram for breast cancer    Personal history of breast cancer    Essential hypertension    Dyslipidemia, goal LDL below 130    Pre-diabetes    Need for hepatitis C screening test    Other orders  -     Cancel: Cologuard; Future  -     Cancel: Mammo screening bilateral w 3d & cad; Future      Richelle Serrano is to [facility] in 3 months for [facility]  She understood, acknowledged and agreed to the plan above  All her questions and concerns were answered and addressed  Case discussed with Dr Jeff Remy    Subjective:      Patient ID: Felicity Torres is a 65 y o  female  Richelle Serrano is a 64 y/o woman that was seen and examined in the office today to establish care  The following portions of the patient's history were reviewed and updated as appropriate: allergies, current medications, past family history, past medical history, past social history, past surgical history and problem list     Review of Systems   All other systems reviewed and are negative  Objective: There were no vitals taken for this visit  Physical Exam  Vitals and nursing note reviewed  Constitutional:       Appearance: Normal appearance  HENT:      Head: Normocephalic and atraumatic  Cardiovascular:      Rate and Rhythm: Normal rate and regular rhythm  Pulses: Normal pulses  Heart sounds: Normal heart sounds  Pulmonary:      Effort: Pulmonary effort is normal       Breath sounds: Normal breath sounds  Abdominal:      General: Abdomen is flat   Bowel sounds are normal       Palpations: Abdomen is soft  Tenderness: There is no right CVA tenderness or left CVA tenderness  Musculoskeletal:      Right lower leg: No edema  Left lower leg: No edema  Skin:     General: Skin is warm  Neurological:      General: No focal deficit present  Mental Status: She is alert and oriented to person, place, and time

## 2021-06-13 NOTE — ASSESSMENT & PLAN NOTE
11/15/2013 core biopsy consistent with invasive ductal carcinoma, ER/ND positive, HER-2 positive, Ki-67 low, overall grade 2; sees H/O   C/w to follow with H/O with Dr Juan Ayon at [facility]

## 2021-06-14 ENCOUNTER — OFFICE VISIT (OUTPATIENT)
Dept: FAMILY MEDICINE CLINIC | Facility: CLINIC | Age: 71
End: 2021-06-14
Payer: COMMERCIAL

## 2021-06-14 VITALS
BODY MASS INDEX: 32.14 KG/M2 | HEIGHT: 66 IN | DIASTOLIC BLOOD PRESSURE: 82 MMHG | WEIGHT: 200 LBS | SYSTOLIC BLOOD PRESSURE: 136 MMHG | TEMPERATURE: 97.4 F | HEART RATE: 98 BPM | OXYGEN SATURATION: 98 %

## 2021-06-14 DIAGNOSIS — Z11.59 NEED FOR HEPATITIS C SCREENING TEST: ICD-10-CM

## 2021-06-14 DIAGNOSIS — R06.00 DYSPNEA ON EXERTION: ICD-10-CM

## 2021-06-14 DIAGNOSIS — F32.9 CURRENT EPISODE OF MAJOR DEPRESSIVE DISORDER WITHOUT PRIOR EPISODE, UNSPECIFIED DEPRESSION EPISODE SEVERITY: ICD-10-CM

## 2021-06-14 DIAGNOSIS — K21.9 GASTROESOPHAGEAL REFLUX DISEASE WITHOUT ESOPHAGITIS: ICD-10-CM

## 2021-06-14 DIAGNOSIS — E78.5 DYSLIPIDEMIA, GOAL LDL BELOW 130: ICD-10-CM

## 2021-06-14 DIAGNOSIS — R56.9 SEIZURE (HCC): ICD-10-CM

## 2021-06-14 DIAGNOSIS — M62.838 MUSCLE SPASM: ICD-10-CM

## 2021-06-14 DIAGNOSIS — I10 ESSENTIAL HYPERTENSION: ICD-10-CM

## 2021-06-14 DIAGNOSIS — Z12.31 ENCOUNTER FOR SCREENING MAMMOGRAM FOR BREAST CANCER: ICD-10-CM

## 2021-06-14 DIAGNOSIS — Z85.3 PERSONAL HISTORY OF BREAST CANCER: ICD-10-CM

## 2021-06-14 DIAGNOSIS — R73.03 PRE-DIABETES: ICD-10-CM

## 2021-06-14 DIAGNOSIS — Z76.89 ESTABLISHING CARE WITH NEW DOCTOR, ENCOUNTER FOR: Primary | ICD-10-CM

## 2021-06-14 PROBLEM — G40.909 SEIZURE DISORDER (HCC): Status: ACTIVE | Noted: 2021-01-27

## 2021-06-14 PROCEDURE — 3725F SCREEN DEPRESSION PERFORMED: CPT | Performed by: FAMILY MEDICINE

## 2021-06-14 PROCEDURE — 99203 OFFICE O/P NEW LOW 30 MIN: CPT | Performed by: FAMILY MEDICINE

## 2021-06-14 PROCEDURE — 1160F RVW MEDS BY RX/DR IN RCRD: CPT | Performed by: FAMILY MEDICINE

## 2021-06-14 PROCEDURE — 3008F BODY MASS INDEX DOCD: CPT | Performed by: FAMILY MEDICINE

## 2021-06-14 PROCEDURE — 3288F FALL RISK ASSESSMENT DOCD: CPT | Performed by: FAMILY MEDICINE

## 2021-06-14 PROCEDURE — 1101F PT FALLS ASSESS-DOCD LE1/YR: CPT | Performed by: FAMILY MEDICINE

## 2021-06-14 RX ORDER — CYCLOBENZAPRINE HCL 5 MG
TABLET ORAL
COMMUNITY
Start: 2021-03-16 | End: 2021-09-29 | Stop reason: ALTCHOICE

## 2021-06-14 RX ORDER — VENLAFAXINE HYDROCHLORIDE 75 MG/1
75 CAPSULE, EXTENDED RELEASE ORAL
Qty: 30 CAPSULE | Refills: 1 | Status: SHIPPED | OUTPATIENT
Start: 2021-06-14 | End: 2022-01-05 | Stop reason: SDUPTHER

## 2021-06-14 RX ORDER — VENLAFAXINE HYDROCHLORIDE 37.5 MG/1
37.5 CAPSULE, EXTENDED RELEASE ORAL
Qty: 30 CAPSULE | Refills: 5 | Status: SHIPPED | OUTPATIENT
Start: 2021-06-14 | End: 2021-06-14 | Stop reason: CLARIF

## 2021-06-14 RX ORDER — LOSARTAN POTASSIUM 25 MG/1
25 TABLET ORAL
COMMUNITY
Start: 2021-02-23 | End: 2021-07-01 | Stop reason: SDUPTHER

## 2021-06-14 RX ORDER — VERAPAMIL HYDROCHLORIDE 80 MG/1
TABLET ORAL
COMMUNITY
End: 2021-06-14 | Stop reason: ALTCHOICE

## 2021-06-14 RX ORDER — OMEPRAZOLE 20 MG/1
CAPSULE, DELAYED RELEASE ORAL
COMMUNITY
End: 2021-06-14 | Stop reason: SDUPTHER

## 2021-06-14 RX ORDER — TAMOXIFEN CITRATE 20 MG/1
TABLET ORAL
COMMUNITY
End: 2021-09-29 | Stop reason: ALTCHOICE

## 2021-06-14 RX ORDER — VENLAFAXINE HYDROCHLORIDE 75 MG/1
CAPSULE, EXTENDED RELEASE ORAL
COMMUNITY
End: 2021-09-29 | Stop reason: SDUPTHER

## 2021-06-14 RX ORDER — OMEPRAZOLE 20 MG/1
20 CAPSULE, DELAYED RELEASE ORAL DAILY
Qty: 30 CAPSULE | Refills: 0 | Status: SHIPPED | OUTPATIENT
Start: 2021-06-14 | End: 2021-07-07 | Stop reason: SDUPTHER

## 2021-06-14 RX ORDER — SIMVASTATIN 40 MG
TABLET ORAL
COMMUNITY
End: 2021-10-26

## 2021-06-14 RX ORDER — CYCLOBENZAPRINE HCL 5 MG
5 TABLET ORAL 3 TIMES DAILY PRN
Qty: 30 TABLET | Refills: 0 | Status: SHIPPED | OUTPATIENT
Start: 2021-06-14 | End: 2021-09-29 | Stop reason: ALTCHOICE

## 2021-06-14 RX ORDER — GLUCOSAMINE/CHONDR SU A SOD 750-600 MG
TABLET ORAL
COMMUNITY
End: 2022-01-05 | Stop reason: ALTCHOICE

## 2021-06-14 RX ORDER — VENLAFAXINE HYDROCHLORIDE 150 MG/1
CAPSULE, EXTENDED RELEASE ORAL
COMMUNITY
End: 2021-09-29 | Stop reason: SDUPTHER

## 2021-06-14 RX ORDER — LEVETIRACETAM 1000 MG/1
TABLET ORAL 2 TIMES DAILY
COMMUNITY
Start: 2021-03-15

## 2021-06-14 RX ORDER — ASPIRIN 81 MG/1
81 TABLET, CHEWABLE ORAL
COMMUNITY
Start: 2021-01-27

## 2021-06-14 RX ORDER — CYCLOSPORINE 0.5 MG/ML
EMULSION OPHTHALMIC
COMMUNITY

## 2021-06-14 RX ORDER — ACETAMINOPHEN 500 MG
500 TABLET ORAL
COMMUNITY
Start: 2021-01-27

## 2021-06-14 NOTE — PROGRESS NOTES
Assessment/Plan/Follow up information       Diagnosis ICD-10-CM Associated Orders   1  Establishing care with new doctor, encounter for  Z76 89    2  Encounter for screening mammogram for breast cancer  Z12 31    3  Personal history of breast cancer  Z85 3    4  Essential hypertension  I10 TSH, 3rd generation with Free T4 reflex     Microalbumin / creatinine urine ratio   5  Dyslipidemia, goal LDL below 130  E78 5 Lipid Panel with Direct LDL reflex     TSH, 3rd generation with Free T4 reflex   6  Pre-diabetes  R73 03 TSH, 3rd generation with Free T4 reflex     HEMOGLOBIN A1C W/ EAG ESTIMATION   7  Need for hepatitis C screening test  Z11 59 Hepatitis C antibody   8  Current episode of major depressive disorder without prior episode, unspecified depression episode severity  F32 9    9  Gastroesophageal reflux disease without esophagitis  K21 9    10  Seizure (HCC)  R56 9    11  Muscle spasm  M62 838    12  Dyspnea on exertion  R06 00             Recent lab work, imagining, and imaging reports reviewed on today's visit with patient, appropriate follow up was initiated if needed  Patient was counseled/educated regarding their diagnosis, and the associated plan  They agreed with the plan, all questions and concerns were answered/addressed  Advised to contact me or the office with any concerns or questions  In the event of an emergency, and unable to contact a provider they are to go to the emergency room  Subjective    HPI:  This is a 24-year-old female presents to the office for establishment of care  Patient states she home shortness difficulties approximately 2 months ago need to reestablish with primary care  Endorses a history of seizures, hyperlipidemia, hypertension, depression, muscle cramps, "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 "  Patient states she is here primarily because she would like to restart her Effexor    States she is on 75 mg/ 150 mg b i d  has been on for approximately 8 years in 2 months ago she stopped the medication cold turkey secondary to not being able to get refills  States since that time she has been "emotional mass "states she has been very tearful depressed flat affect lack of  Enjoyment of activities and she would like to restart the medication  Additionally states she has noticed over the past week or so dyspnea on exertion especially with climbing stairs and diffuse bilateral muscle spasms in the legs  Denies any history of COPD or CHF  Additionally would like refills on her blood pressure medication and her omeprazole  Has a longstanding history of hyperlipidemia however would like to withhold the medication pending a new lipid panel  Additionally states that she is on Keppra 1000 mg b i d  for seizures which is monitored/ treated by Neurology  She would like to have a Keppra level performed prior to her visit with them approximately 1 month      Review of Systems   Constitutional: Positive for fatigue  Respiratory: Positive for shortness of breath  Cardiovascular: Positive for leg swelling  Neurological: Positive for weakness  Psychiatric/Behavioral: Positive for dysphoric mood  Objective    Vitals:    06/14/21 0911   BP: 136/82   Pulse: 98   Temp: (!) 97 4 °F (36 3 °C)   SpO2: 98%         Physical Exam  Constitutional:       General: She is not in acute distress  Appearance: She is well-developed  She is obese  She is not ill-appearing or diaphoretic  HENT:      Head: Normocephalic and atraumatic  Eyes:      Conjunctiva/sclera: Conjunctivae normal       Pupils: Pupils are equal, round, and reactive to light  Cardiovascular:      Rate and Rhythm: Normal rate and regular rhythm  Heart sounds: Normal heart sounds  No murmur heard  No friction rub  Pulmonary:      Effort: Pulmonary effort is normal       Breath sounds: Normal breath sounds  Abdominal:      General: Bowel sounds are normal       Palpations: Abdomen is soft  Musculoskeletal:         General: Normal range of motion  Cervical back: Normal range of motion and neck supple  Skin:     General: Skin is warm  Capillary Refill: Capillary refill takes less than 2 seconds  Neurological:      Mental Status: She is alert and oriented to person, place, and time  Motor: No abnormal muscle tone  Coordination: Coordination normal    Psychiatric:         Behavior: Behavior normal          Thought Content: Thought content normal       Comments: Tearful affect            Portions of the record may have been created with voice recognition software  Occasional wrong word or "sound a like" substitutions may have occurred due to the inherent limitations of voice recognition software  Read the chart carefully and recognize, using context, where substitutions have occurred  Contact me with any questions         Gregory Denis MD 06/14/21

## 2021-06-18 NOTE — PROGRESS NOTES
BMI Counseling: Body mass index is 32 28 kg/m²  The BMI is above normal  Nutrition recommendations include reducing portion sizes, decreasing overall calorie intake, 3-5 servings of fruits/vegetables daily, reducing fast food intake, consuming healthier snacks, decreasing soda and/or juice intake, moderation in carbohydrate intake, increasing intake of lean protein, reducing intake of saturated fat and trans fat and reducing intake of cholesterol  Exercise recommendations include moderate aerobic physical activity for 150 minutes/week, vigorous aerobic physical activity for 75 minutes/week, exercising 3-5 times per week, joining a gym and strength training exercises

## 2021-06-24 ENCOUNTER — TELEPHONE (OUTPATIENT)
Dept: FAMILY MEDICINE CLINIC | Facility: CLINIC | Age: 71
End: 2021-06-24

## 2021-06-24 LAB — HCV AB SER-ACNC: NORMAL

## 2021-06-29 RX ORDER — VENLAFAXINE HYDROCHLORIDE 37.5 MG/1
CAPSULE, EXTENDED RELEASE ORAL
COMMUNITY
Start: 2021-06-14 | End: 2021-09-29 | Stop reason: SDUPTHER

## 2021-07-01 ENCOUNTER — OFFICE VISIT (OUTPATIENT)
Dept: FAMILY MEDICINE CLINIC | Facility: CLINIC | Age: 71
End: 2021-07-01
Payer: COMMERCIAL

## 2021-07-01 VITALS
TEMPERATURE: 97.8 F | DIASTOLIC BLOOD PRESSURE: 80 MMHG | BODY MASS INDEX: 32.78 KG/M2 | HEIGHT: 66 IN | SYSTOLIC BLOOD PRESSURE: 134 MMHG | WEIGHT: 204 LBS | HEART RATE: 88 BPM | OXYGEN SATURATION: 98 %

## 2021-07-01 DIAGNOSIS — G40.909 SEIZURE DISORDER (HCC): ICD-10-CM

## 2021-07-01 DIAGNOSIS — R56.9 GENERALIZED CONVULSIVE SEIZURE (HCC): ICD-10-CM

## 2021-07-01 DIAGNOSIS — C50.911 MALIGNANT NEOPLASM OF RIGHT BREAST IN FEMALE, ESTROGEN RECEPTOR POSITIVE, UNSPECIFIED SITE OF BREAST (HCC): ICD-10-CM

## 2021-07-01 DIAGNOSIS — Z17.0 MALIGNANT NEOPLASM OF RIGHT BREAST IN FEMALE, ESTROGEN RECEPTOR POSITIVE, UNSPECIFIED SITE OF BREAST (HCC): ICD-10-CM

## 2021-07-01 DIAGNOSIS — I10 ESSENTIAL HYPERTENSION: Primary | ICD-10-CM

## 2021-07-01 PROBLEM — E11.9 TYPE 2 DIABETES MELLITUS WITHOUT COMPLICATION, WITHOUT LONG-TERM CURRENT USE OF INSULIN (HCC): Status: ACTIVE | Noted: 2021-07-01

## 2021-07-01 PROCEDURE — 3008F BODY MASS INDEX DOCD: CPT | Performed by: FAMILY MEDICINE

## 2021-07-01 PROCEDURE — 1036F TOBACCO NON-USER: CPT | Performed by: FAMILY MEDICINE

## 2021-07-01 PROCEDURE — 3075F SYST BP GE 130 - 139MM HG: CPT | Performed by: FAMILY MEDICINE

## 2021-07-01 PROCEDURE — 99214 OFFICE O/P EST MOD 30 MIN: CPT | Performed by: FAMILY MEDICINE

## 2021-07-01 PROCEDURE — 1160F RVW MEDS BY RX/DR IN RCRD: CPT | Performed by: FAMILY MEDICINE

## 2021-07-01 PROCEDURE — 3079F DIAST BP 80-89 MM HG: CPT | Performed by: FAMILY MEDICINE

## 2021-07-01 PROCEDURE — 4010F ACE/ARB THERAPY RXD/TAKEN: CPT | Performed by: FAMILY MEDICINE

## 2021-07-01 RX ORDER — LOSARTAN POTASSIUM 50 MG/1
50 TABLET ORAL DAILY
Qty: 90 TABLET | Refills: 3 | Status: SHIPPED | OUTPATIENT
Start: 2021-07-01 | End: 2022-06-24

## 2021-07-01 NOTE — PROGRESS NOTES
Assessment/Plan:    No problem-specific Assessment & Plan notes found for this encounter  Diagnoses and all orders for this visit:    Essential hypertension  -     losartan (COZAAR) 50 mg tablet; Take 1 tablet (50 mg total) by mouth daily    Seizure disorder (HCC)  -     losartan (COZAAR) 50 mg tablet; Take 1 tablet (50 mg total) by mouth daily    Generalized convulsive seizure (HCC)  -     losartan (COZAAR) 50 mg tablet; Take 1 tablet (50 mg total) by mouth daily    Malignant neoplasm of right breast in female, estrogen receptor positive, unspecified site of breast ([place] [address] )    Other orders  -     venlafaxine (EFFEXOR-XR) 37 5 mg 24 hr capsule;  (Patient not taking: Reported on 7/1/2021)          Subjective:      Patient ID: Levi Mansfield is a 65 y o  female  She has grade 1 diastolic dysfunction  She is mildly SOB with exertion  She has no CP, PND, or orthopnea  She now meets the criteria for T2DM  She has no signs or symptoms of DM  She has no weight changes  She is not interested in a statin medication at this time  She would like to try to lose weight and see if her blood sugar and cholesterol improve  The following portions of the patient's history were reviewed and updated as appropriate:   She  has a past medical history of Breast cancer ([place] [address] )    She   Patient Active Problem List    Diagnosis Date Noted    Type 2 diabetes mellitus without complication, without long-term current use of insulin ([place] [address] ) 07/01/2021    Malignant neoplasm of right breast in female, estrogen receptor positive, unspecified site of breast ([place] [address] ) 07/01/2021    Essential hypertension 01/27/2021    Seizure disorder ([place] [address] ) 01/27/2021    Pre-diabetes 07/08/2020    Generalized convulsive seizure ([place] [address] ) 02/19/2020    Mood disorder ([place] [address] ) 10/02/2019    Gastroesophageal reflux disease 12/24/2018    Personal history of breast cancer 01/19/2017    Dyslipidemia, goal LDL below 130 03/04/2014     She  has a past surgical history that includes Hysterectomy and Mastectomy  Her family history includes Asthma in her son; Cancer in her brother, mother, and sister; Diabetes in her brother, father, mother, and sister; Heart disease in her father and mother; Hypertension in her father, mother, and son; Kidney disease in her daughter  She  reports that she has never smoked  She has never used smokeless tobacco  She reports previous alcohol use  No history on file for drug use    Current Outpatient Medications   Medication Sig Dispense Refill    acetaminophen (TYLENOL) 500 mg tablet Take 500 mg by mouth      aspirin 81 mg chewable tablet Chew 81 mg      Calcium Carb-Cholecalciferol (Calcium 600+D3) 600-200 MG-UNIT TABS Take by mouth      cycloSPORINE (Restasis) 0 05 % ophthalmic emulsion       levETIRAcetam (KEPPRA) 1000 MG tablet       losartan (COZAAR) 50 mg tablet Take 1 tablet (50 mg total) by mouth daily 90 tablet 3    omeprazole (PriLOSEC) 20 mg delayed release capsule Take 1 capsule (20 mg total) by mouth daily 30 capsule 0    venlafaxine (EFFEXOR-XR) 75 mg 24 hr capsule Take 1 capsule (75 mg total) by mouth daily with breakfast 30 capsule 1    cyclobenzaprine (FLEXERIL) 5 mg tablet take 1 to 2 tablets by mouth three times a day if needed for muscle spasm (Patient not taking: Reported on 7/1/2021)      cyclobenzaprine (FLEXERIL) 5 mg tablet Take 1 tablet (5 mg total) by mouth 3 (three) times a day as needed for muscle spasms (Patient not taking: Reported on 7/1/2021) 30 tablet 0    simvastatin (ZOCOR) 40 mg tablet Take by mouth (Patient not taking: Reported on 7/1/2021)      tamoxifen (NOLVADEX) 20 mg tablet  (Patient not taking: Reported on 7/1/2021)      venlafaxine (EFFEXOR-XR) 150 mg 24 hr capsule Take by mouth (Patient not taking: Reported on 7/1/2021)      venlafaxine (EFFEXOR-XR) 37 5 mg 24 hr capsule  (Patient not taking: Reported on 7/1/2021)      venlafaxine (EFFEXOR-XR) 75 mg 24 hr capsule  (Patient not taking: Reported on 7/1/2021)       No current facility-administered medications for this visit  Current Outpatient Medications on File Prior to Visit   Medication Sig    acetaminophen (TYLENOL) 500 mg tablet Take 500 mg by mouth    aspirin 81 mg chewable tablet Chew 81 mg    Calcium Carb-Cholecalciferol (Calcium 600+D3) 600-200 MG-UNIT TABS Take by mouth    cycloSPORINE (Restasis) 0 05 % ophthalmic emulsion     levETIRAcetam (KEPPRA) 1000 MG tablet     omeprazole (PriLOSEC) 20 mg delayed release capsule Take 1 capsule (20 mg total) by mouth daily    venlafaxine (EFFEXOR-XR) 75 mg 24 hr capsule Take 1 capsule (75 mg total) by mouth daily with breakfast    [DISCONTINUED] losartan (COZAAR) 25 mg tablet Take 25 mg by mouth    cyclobenzaprine (FLEXERIL) 5 mg tablet take 1 to 2 tablets by mouth three times a day if needed for muscle spasm (Patient not taking: Reported on 7/1/2021)    cyclobenzaprine (FLEXERIL) 5 mg tablet Take 1 tablet (5 mg total) by mouth 3 (three) times a day as needed for muscle spasms (Patient not taking: Reported on 7/1/2021)    simvastatin (ZOCOR) 40 mg tablet Take by mouth (Patient not taking: Reported on 7/1/2021)    tamoxifen (NOLVADEX) 20 mg tablet  (Patient not taking: Reported on 7/1/2021)    venlafaxine (EFFEXOR-XR) 150 mg 24 hr capsule Take by mouth (Patient not taking: Reported on 7/1/2021)    venlafaxine (EFFEXOR-XR) 37 5 mg 24 hr capsule  (Patient not taking: Reported on 7/1/2021)    venlafaxine (EFFEXOR-XR) 75 mg 24 hr capsule  (Patient not taking: Reported on 7/1/2021)     No current facility-administered medications on file prior to visit  She is allergic to pollen extract       Review of Systems   All other systems reviewed and are negative  Objective:      /80   Pulse 88   Temp 97 8 °F (36 6 °C)   Ht 5' 6" (1 676 m)   Wt 92 5 kg (204 lb)   SpO2 98%   BMI 32 93 kg/m²          Physical Exam  Vitals and nursing note reviewed  Constitutional:       Appearance: Normal appearance  She is obese  HENT:      Head: Normocephalic and atraumatic  Cardiovascular:      Rate and Rhythm: Normal rate and regular rhythm  Pulses: Normal pulses  Heart sounds: Normal heart sounds  Pulmonary:      Effort: Pulmonary effort is normal       Breath sounds: Normal breath sounds  Abdominal:      General: Abdomen is flat  Bowel sounds are normal       Palpations: Abdomen is soft  Musculoskeletal:         General: Normal range of motion  Cervical back: Normal range of motion and neck supple  Skin:     General: Skin is warm and dry  Capillary Refill: Capillary refill takes less than 2 seconds  Neurological:      General: No focal deficit present  Mental Status: She is alert and oriented to person, place, and time  Mental status is at baseline  Psychiatric:         Mood and Affect: Mood normal          Behavior: Behavior normal          Thought Content:  Thought content normal          Judgment: Judgment normal

## 2021-07-01 NOTE — PROGRESS NOTES
Diabetic Foot Exam    Patient's shoes and socks removed  Right Foot/Ankle   Right Foot Inspection  Skin Exam: skin normal and skin intact no dry skin, no warmth, no callus, no erythema, no maceration, no abnormal color, no pre-ulcer, no ulcer and no callus                            Sensory   Vibration: intact  Proprioception: intact   Monofilament testing: intact  Vascular  Capillary refills: < 3 seconds  The right DP pulse is 2+  The right PT pulse is 2+  Left Foot/Ankle  Left Foot Inspection  Skin Exam: skin normal and skin intactno dry skin, no warmth, no erythema, no maceration, normal color, no pre-ulcer, no ulcer and no callus                                         Sensory   Vibration: intact  Proprioception: intact  Monofilament: intact  Vascular  Capillary refills: < 3 seconds  The left DP pulse is 2+  The left PT pulse is 2+  Assign Risk Category:  No deformity present; No loss of protective sensation;  No weak pulses       Risk: 0

## 2021-07-07 DIAGNOSIS — K21.9 GASTROESOPHAGEAL REFLUX DISEASE WITHOUT ESOPHAGITIS: ICD-10-CM

## 2021-07-07 RX ORDER — OMEPRAZOLE 20 MG/1
20 CAPSULE, DELAYED RELEASE ORAL DAILY
Qty: 30 CAPSULE | Refills: 0 | Status: SHIPPED | OUTPATIENT
Start: 2021-07-07 | End: 2021-08-03

## 2021-08-04 DIAGNOSIS — F32.9 CURRENT EPISODE OF MAJOR DEPRESSIVE DISORDER WITHOUT PRIOR EPISODE, UNSPECIFIED DEPRESSION EPISODE SEVERITY: ICD-10-CM

## 2021-08-09 RX ORDER — VENLAFAXINE HYDROCHLORIDE 75 MG/1
75 CAPSULE, EXTENDED RELEASE ORAL
Qty: 30 CAPSULE | Refills: 1 | OUTPATIENT
Start: 2021-08-09

## 2021-09-10 ENCOUNTER — TELEPHONE (OUTPATIENT)
Dept: ADMINISTRATIVE | Facility: OTHER | Age: 71
End: 2021-09-10

## 2021-09-10 NOTE — TELEPHONE ENCOUNTER
Upon review of the In Basket request we were able to locate, review, and update the patient chart as requested for Mammogram     Any additional questions or concerns should be emailed to the Practice Liaisons via [email or web address] email, please do not reply via In Basket      Thank you  Gerrianne Crigler

## 2021-09-10 NOTE — TELEPHONE ENCOUNTER
----- Message from Marlon Rose sent at 9/9/2021  3:31 PM EDT -----  Regarding: Care Gap Request  09/09/21 3:31 PM    Hello, our patient attached above has had Mammogram completed/performed  Please assist in updating the patient chart by pulling the Care Everywhere (CE) document  The date of service is 2021         Thank you,  Carla Krishnan MA  [facility]

## 2021-09-30 ENCOUNTER — TELEPHONE (OUTPATIENT)
Dept: ADMINISTRATIVE | Facility: OTHER | Age: 71
End: 2021-09-30

## 2021-09-30 NOTE — TELEPHONE ENCOUNTER
----- Message from Deidre Ellis sent at 9/29/2021  2:46 PM EDT -----  Regarding: [facility]  09/29/21 2:46 PM    Hello, our patient aSlo Villanueva has had Hepatitis C completed/performed  Please assist in updating the patient chart by pulling the Care Everywhere (CE) document  The date of service is 06/24/2021       Thank you,  Deidre Ellis  [facility]

## 2021-09-30 NOTE — TELEPHONE ENCOUNTER
Upon review of the In Basket request we were able to locate, review, and update the patient chart as requested for Hepatitis C   Any additional questions or concerns should be emailed to the Practice Liaisons via [email or web address] email, please do not reply via In Basket      Thank you  Gerrianne Crigler

## 2021-10-04 DIAGNOSIS — R73.03 PRE-DIABETES: Primary | ICD-10-CM

## 2021-10-04 DIAGNOSIS — I10 ESSENTIAL HYPERTENSION: ICD-10-CM

## 2021-10-04 DIAGNOSIS — E78.5 DYSLIPIDEMIA, GOAL LDL BELOW 130: ICD-10-CM

## 2021-10-25 ENCOUNTER — APPOINTMENT (OUTPATIENT)
Dept: LAB | Facility: CLINIC | Age: 71
End: 2021-10-25
Payer: COMMERCIAL

## 2021-10-25 DIAGNOSIS — E78.5 DYSLIPIDEMIA, GOAL LDL BELOW 130: ICD-10-CM

## 2021-10-25 DIAGNOSIS — I10 ESSENTIAL HYPERTENSION: ICD-10-CM

## 2021-10-25 DIAGNOSIS — R73.03 PRE-DIABETES: ICD-10-CM

## 2021-10-25 LAB
BASOPHILS # BLD AUTO: 0.03 THOUSANDS/ΜL (ref 0–0.1)
BASOPHILS NFR BLD AUTO: 1 % (ref 0–1)
CHOLEST SERPL-MCNC: 285 MG/DL (ref 50–200)
EOSINOPHIL # BLD AUTO: 0.09 THOUSAND/ΜL (ref 0–0.61)
EOSINOPHIL NFR BLD AUTO: 2 % (ref 0–6)
ERYTHROCYTE [DISTWIDTH] IN BLOOD BY AUTOMATED COUNT: 12.7 % (ref 11.6–15.1)
EST. AVERAGE GLUCOSE BLD GHB EST-MCNC: 123 MG/DL
HBA1C MFR BLD: 5.9 %
HCT VFR BLD AUTO: 41.9 % (ref 34.8–46.1)
HDLC SERPL-MCNC: 79 MG/DL
HGB BLD-MCNC: 13.5 G/DL (ref 11.5–15.4)
IMM GRANULOCYTES # BLD AUTO: 0.03 THOUSAND/UL (ref 0–0.2)
IMM GRANULOCYTES NFR BLD AUTO: 1 % (ref 0–2)
LDLC SERPL CALC-MCNC: 174 MG/DL (ref 0–100)
LYMPHOCYTES # BLD AUTO: 1.59 THOUSANDS/ΜL (ref 0.6–4.47)
LYMPHOCYTES NFR BLD AUTO: 39 % (ref 14–44)
MCH RBC QN AUTO: 29.2 PG (ref 26.8–34.3)
MCHC RBC AUTO-ENTMCNC: 32.2 G/DL (ref 31.4–37.4)
MCV RBC AUTO: 91 FL (ref 82–98)
MONOCYTES # BLD AUTO: 0.28 THOUSAND/ΜL (ref 0.17–1.22)
MONOCYTES NFR BLD AUTO: 7 % (ref 4–12)
NEUTROPHILS # BLD AUTO: 2.09 THOUSANDS/ΜL (ref 1.85–7.62)
NEUTS SEG NFR BLD AUTO: 50 % (ref 43–75)
NONHDLC SERPL-MCNC: 206 MG/DL
NRBC BLD AUTO-RTO: 0 /100 WBCS
PLATELET # BLD AUTO: 215 THOUSANDS/UL (ref 149–390)
PMV BLD AUTO: 10.5 FL (ref 8.9–12.7)
RBC # BLD AUTO: 4.62 MILLION/UL (ref 3.81–5.12)
TRIGL SERPL-MCNC: 162 MG/DL
WBC # BLD AUTO: 4.11 THOUSAND/UL (ref 4.31–10.16)

## 2021-10-25 PROCEDURE — 80061 LIPID PANEL: CPT

## 2021-10-25 PROCEDURE — 83036 HEMOGLOBIN GLYCOSYLATED A1C: CPT

## 2021-10-25 PROCEDURE — 85025 COMPLETE CBC W/AUTO DIFF WBC: CPT

## 2021-10-25 PROCEDURE — 36415 COLL VENOUS BLD VENIPUNCTURE: CPT

## 2021-10-25 PROCEDURE — 3044F HG A1C LEVEL LT 7.0%: CPT | Performed by: STUDENT IN AN ORGANIZED HEALTH CARE EDUCATION/TRAINING PROGRAM

## 2021-10-26 ENCOUNTER — OFFICE VISIT (OUTPATIENT)
Dept: FAMILY MEDICINE CLINIC | Facility: CLINIC | Age: 71
End: 2021-10-26
Payer: COMMERCIAL

## 2021-10-26 VITALS
WEIGHT: 197 LBS | SYSTOLIC BLOOD PRESSURE: 130 MMHG | BODY MASS INDEX: 31.66 KG/M2 | HEART RATE: 89 BPM | OXYGEN SATURATION: 98 % | TEMPERATURE: 98.1 F | DIASTOLIC BLOOD PRESSURE: 80 MMHG | HEIGHT: 66 IN

## 2021-10-26 DIAGNOSIS — R20.2 PARESTHESIA OF HAND, BILATERAL: ICD-10-CM

## 2021-10-26 DIAGNOSIS — E11.9 TYPE 2 DIABETES MELLITUS WITHOUT COMPLICATION, WITHOUT LONG-TERM CURRENT USE OF INSULIN (HCC): ICD-10-CM

## 2021-10-26 DIAGNOSIS — Z00.00 MEDICARE ANNUAL WELLNESS VISIT, SUBSEQUENT: Primary | ICD-10-CM

## 2021-10-26 PROCEDURE — 3008F BODY MASS INDEX DOCD: CPT | Performed by: STUDENT IN AN ORGANIZED HEALTH CARE EDUCATION/TRAINING PROGRAM

## 2021-10-26 PROCEDURE — 1170F FXNL STATUS ASSESSED: CPT | Performed by: STUDENT IN AN ORGANIZED HEALTH CARE EDUCATION/TRAINING PROGRAM

## 2021-10-26 PROCEDURE — 99213 OFFICE O/P EST LOW 20 MIN: CPT | Performed by: STUDENT IN AN ORGANIZED HEALTH CARE EDUCATION/TRAINING PROGRAM

## 2021-10-26 PROCEDURE — 3079F DIAST BP 80-89 MM HG: CPT | Performed by: STUDENT IN AN ORGANIZED HEALTH CARE EDUCATION/TRAINING PROGRAM

## 2021-10-26 PROCEDURE — 3288F FALL RISK ASSESSMENT DOCD: CPT | Performed by: STUDENT IN AN ORGANIZED HEALTH CARE EDUCATION/TRAINING PROGRAM

## 2021-10-26 PROCEDURE — G0439 PPPS, SUBSEQ VISIT: HCPCS | Performed by: STUDENT IN AN ORGANIZED HEALTH CARE EDUCATION/TRAINING PROGRAM

## 2021-10-26 PROCEDURE — 1160F RVW MEDS BY RX/DR IN RCRD: CPT | Performed by: STUDENT IN AN ORGANIZED HEALTH CARE EDUCATION/TRAINING PROGRAM

## 2021-10-26 PROCEDURE — 1036F TOBACCO NON-USER: CPT | Performed by: STUDENT IN AN ORGANIZED HEALTH CARE EDUCATION/TRAINING PROGRAM

## 2021-10-26 PROCEDURE — 1125F AMNT PAIN NOTED PAIN PRSNT: CPT | Performed by: STUDENT IN AN ORGANIZED HEALTH CARE EDUCATION/TRAINING PROGRAM

## 2021-10-26 PROCEDURE — 3075F SYST BP GE 130 - 139MM HG: CPT | Performed by: STUDENT IN AN ORGANIZED HEALTH CARE EDUCATION/TRAINING PROGRAM

## 2021-10-26 RX ORDER — SIMVASTATIN 40 MG
40 TABLET ORAL
Qty: 30 TABLET | Refills: 5 | Status: SHIPPED | OUTPATIENT
Start: 2021-10-26 | End: 2022-04-25

## 2021-11-02 ENCOUNTER — IMMUNIZATIONS (OUTPATIENT)
Dept: FAMILY MEDICINE CLINIC | Facility: CLINIC | Age: 71
End: 2021-11-02
Payer: COMMERCIAL

## 2021-11-02 DIAGNOSIS — Z23 FLU VACCINE NEED: Primary | ICD-10-CM

## 2021-11-02 PROCEDURE — G0008 ADMIN INFLUENZA VIRUS VAC: HCPCS

## 2021-11-02 PROCEDURE — 90662 IIV NO PRSV INCREASED AG IM: CPT

## 2022-01-05 ENCOUNTER — OFFICE VISIT (OUTPATIENT)
Dept: FAMILY MEDICINE CLINIC | Facility: CLINIC | Age: 72
End: 2022-01-05
Payer: COMMERCIAL

## 2022-01-05 VITALS
DIASTOLIC BLOOD PRESSURE: 82 MMHG | HEIGHT: 66 IN | OXYGEN SATURATION: 98 % | WEIGHT: 199 LBS | TEMPERATURE: 98 F | HEART RATE: 91 BPM | SYSTOLIC BLOOD PRESSURE: 134 MMHG | BODY MASS INDEX: 31.98 KG/M2

## 2022-01-05 DIAGNOSIS — F39 MOOD DISORDER (HCC): ICD-10-CM

## 2022-01-05 DIAGNOSIS — R73.03 PRE-DIABETES: Primary | ICD-10-CM

## 2022-01-05 DIAGNOSIS — M75.51 CHRONIC BURSITIS OF RIGHT SHOULDER: ICD-10-CM

## 2022-01-05 DIAGNOSIS — C50.911 MALIGNANT NEOPLASM OF RIGHT BREAST IN FEMALE, ESTROGEN RECEPTOR POSITIVE, UNSPECIFIED SITE OF BREAST (HCC): ICD-10-CM

## 2022-01-05 DIAGNOSIS — R56.9 GENERALIZED CONVULSIVE SEIZURE (HCC): ICD-10-CM

## 2022-01-05 DIAGNOSIS — K21.9 GASTROESOPHAGEAL REFLUX DISEASE WITHOUT ESOPHAGITIS: ICD-10-CM

## 2022-01-05 DIAGNOSIS — E55.9 VITAMIN D DEFICIENCY: ICD-10-CM

## 2022-01-05 DIAGNOSIS — I10 PRIMARY HYPERTENSION: ICD-10-CM

## 2022-01-05 DIAGNOSIS — M18.12 ARTHRITIS OF CARPOMETACARPAL (CMC) JOINT OF LEFT THUMB: ICD-10-CM

## 2022-01-05 DIAGNOSIS — Z17.0 MALIGNANT NEOPLASM OF RIGHT BREAST IN FEMALE, ESTROGEN RECEPTOR POSITIVE, UNSPECIFIED SITE OF BREAST (HCC): ICD-10-CM

## 2022-01-05 DIAGNOSIS — Z78.0 ASYMPTOMATIC POSTMENOPAUSAL STATE: ICD-10-CM

## 2022-01-05 PROBLEM — E11.9 TYPE 2 DIABETES MELLITUS WITHOUT COMPLICATION, WITHOUT LONG-TERM CURRENT USE OF INSULIN (HCC): Status: RESOLVED | Noted: 2021-07-01 | Resolved: 2022-01-05

## 2022-01-05 PROBLEM — G40.909 SEIZURE DISORDER (HCC): Status: RESOLVED | Noted: 2021-01-27 | Resolved: 2022-01-05

## 2022-01-05 PROCEDURE — 3008F BODY MASS INDEX DOCD: CPT | Performed by: FAMILY MEDICINE

## 2022-01-05 PROCEDURE — 3079F DIAST BP 80-89 MM HG: CPT | Performed by: FAMILY MEDICINE

## 2022-01-05 PROCEDURE — 99215 OFFICE O/P EST HI 40 MIN: CPT | Performed by: FAMILY MEDICINE

## 2022-01-05 PROCEDURE — 1160F RVW MEDS BY RX/DR IN RCRD: CPT | Performed by: FAMILY MEDICINE

## 2022-01-05 PROCEDURE — 3075F SYST BP GE 130 - 139MM HG: CPT | Performed by: FAMILY MEDICINE

## 2022-01-05 PROCEDURE — 1036F TOBACCO NON-USER: CPT | Performed by: FAMILY MEDICINE

## 2022-01-05 RX ORDER — LOSARTAN POTASSIUM 25 MG/1
25 TABLET ORAL DAILY
COMMUNITY
Start: 2021-11-20 | End: 2022-01-05 | Stop reason: ALTCHOICE

## 2022-01-05 RX ORDER — VENLAFAXINE HYDROCHLORIDE 75 MG/1
75 CAPSULE, EXTENDED RELEASE ORAL
Qty: 90 CAPSULE | Refills: 1 | Status: SHIPPED | OUTPATIENT
Start: 2022-01-05 | End: 2022-07-01

## 2022-01-05 RX ORDER — OMEPRAZOLE 20 MG/1
20 CAPSULE, DELAYED RELEASE ORAL DAILY
Qty: 90 CAPSULE | Refills: 1 | Status: SHIPPED | OUTPATIENT
Start: 2022-01-05 | End: 2022-07-01

## 2022-01-05 NOTE — PATIENT INSTRUCTIONS
Rotator Cuff Injury Exercises   WHAT YOU NEED TO KNOW:   What do I need to know about rotator cuff injury exercises? Exercises help improve shoulder movement and strength, and decrease pain  Your physical therapist or healthcare provider will tell you when to start doing the exercises  He or she will tell you how often to do them  You will need to start slowly to prevent more injury  You will move through several levels over time as you get stronger and more flexible  What safety guidelines do I need to follow? · Always warm up before you do the exercises  Walk or ride a stationary bike for 5 to 10 minutes to help you warm up  · Do not put your arm over your head until directed  You will need to wait until your injury has healed  The movement of some exercises could continue until your arm is over your head  You will need to stop the movement where directed  · Stop if you feel pain  You may feel some tight or stiff areas when you start  This should get better as you continue the exercises  You should not feel pain  Pain means you are not ready to do the exercise yet  Stop and call your physical therapist or healthcare provider right away  · Always work both rotator cuffs  Even if your injury is only on 1 side, it is important to do each exercise on both sides  This helps prevent injury and maintain balance in your shoulders and back  · Posture is important  Your physical therapist or healthcare provider will show you the proper posture for each exercise  You will be shown how to pull your shoulders back and down to engage the correct muscles  Remember not to let your shoulders shrug during an exercise unless it is part of the movement  How should I do stretching exercises? You will not feel every exercise in your shoulder area  You may feel some of the stretches in your back, side, or upper arm muscles  You need to work muscles in and around your rotator cuffs and down your arms   This helps stabilize your shoulders  Your physical therapist or healthcare provider will tell you how long to hold each stretch  He or she will also tell you how many times to repeat each stretch during an exercise session  You may be told to do only certain exercises, or to do them in a specific order  The following are general directions to help you remember the exercises you are taught:  · Pendulum swings:  Lean forward and rest your arm on a table  Do not round your back or lock your knees during the exercise  Let your other arm hang freely by your side  Gently swing your free arm forward and backward, side to side, and in circles  · Crossover arm stretch:  Relax your shoulders  Hold your upper arm with the opposite hand  Pull your arm across your chest until you feel a stretch  Hold the stretch  Return to the starting position  · Sleeper stretch:  Lie on your side on a firm, flat surface  Bend the elbow of your top arm 90°  Use your other hand to slowly push your arm down  Stop when you feel a stretch at the back of your shoulder  Hold the stretch  Slowly return to the starting position  · Shoulder movement, up and down: This exercise may also be called shoulder extension  Sit in a chair that has a back but no armrests  Raise your arm like you are reaching for the wall in front of you  Continue to raise the arm until it is over your head, or as high as directed  Bring your arm back down to your side  Bring it back as far as possible behind your body  Return your arm to the starting position  · Shoulder movement, side to side: These movements may be called flexion, internal rotation, and external rotation  Sit in a chair that has a back but no armrests  Raise your arm to the side and then up over your head as far as directed  Return your arm to your side  Bring your arm across the front of your body and reach for the opposite shoulder  Return your arm to the starting position  · Shoulder rolls:  Stand and raise both shoulders toward your ears  Lower them to the starting position  Relax your shoulders  Pull your shoulders back  Then relax them again  Roll your shoulders in a smooth [employer]  Then roll your shoulders in a smooth [employer] in the other direction  · Wall reach exercise: This may also be called a flexion stretch  Stand facing a wall  Slowly walk your fingers up the wall until you feel a stretch  Hold the stretch  Return to the starting position  · Arm reach exercise:  Lie on your back with your legs straight  Reach your arms like you are trying to touch the ceiling  Reach as high as you can so you feel a stretch in the back of your arms  Hold the stretch  Then lower your arms to your sides  · Elbow bends:  Stand with your arms down to your sides  Keep your palm facing forward  Bend your elbow and try to touch your shoulder with your fingertips  Return your arm to the starting position  · Up the back stretch:  Stand and put both arms behind your back  Put one hand under the other  Move the bottom hand and slowly push the upper hand up toward your head  You should feel a stretch in the front of your arm and shoulder  Be careful not to push too hard  Hold the stretch  Then return to the starting position  · Triceps stretch:  Stand and drop your forearm down your back so your hand is pointing to the ground behind you  Your elbow should be pointing at the ceiling  Take your other hand and place it on your elbow  Gently and slowly push on the elbow until you feel a stretch in the back of your arm  Hold the stretch  Let go of your elbow and relax your arm  You may be shown how to do this stretch with a towel  The towel can be pulled gently with a hand behind your back at waist level  How should I do strengthening exercises? Strengthening exercises may include handheld weights or resistance bands   Your physical therapist or healthcare provider will tell you how much weight or resistance to use  The general guide is to use light weights or low resistance and to do a high number of repetitions  You may be told to do only certain exercises, or to do them in a specific order  The following are general directions to help you remember the exercises you are taught:  · Scapular squeeze:  Stand with your arms at your sides  Squeeze your shoulder blades together and hold this position  Then relax the muscles  Keep your shoulder back during the entire exercise  · Wall pushups: This exercise is similar to a pushup done on the ground  The goal is to use your back and shoulder muscles to bring your upper body toward and away from the wall  Stand facing a wall  Put your hands on the wall  Bend your elbows to bring your upper body toward the wall  Straighten your arms to return to the starting position  Keep your feet close enough to the wall that you do not take a step when you bend your elbows  · Standing row with exercise band:  Wrap the exercise band around a heavy, stable object at waist level  Make loop in the end of the band to create a handle, if needed  Hold the handle or loop and pull the band straight back toward your hip  Keep your shoulder down  Squeeze your shoulder blade  Hold this position  Then slowly return to the starting position  · External rotation with arm abducted 90 degrees:  Wrap the exercise band around a heavy, stable object at waist level  Make loop in the end of the band to create a handle, if needed  Stand and hold the handle or loop  Bend your elbow and raise your arm to shoulder height  Keep your arm in this position  Raise your hand like you are pointing at the ceiling  Slowly return to the starting position  You may also be shown how to do this exercise lying down and with a weight  · Shoulder abduction with weight:  Stand and hold a weight in your hand with your palm facing your body   Slowly raise your arm to the side with your thumb pointing up  Then raise your arm as far as you can without pain  Hold this position  Then return to the starting position  · Shoulder abduction with exercise band:  Wrap the exercise band around a heavy, stable object near your foot  Grab the band  Keep your arm straight  Slowly raise your arm to the side with your thumb pointing up  Then, slowly pull the band as far as you can without pain  Slowly return to the starting position  · Shoulder adduction with weight:  Lie on your back on a firm surface  Hold a weight in your hand at your shoulder  Slowly raise your arm toward the ceiling and straighten your elbow  Hold this position  Then slowly return to the starting position  · Shoulder adduction with exercise band:  Wrap the exercise band around a heavy, stable object  Stand and face away from where the band is anchored  Hold each end of the band in both hands with your elbows bent  Your elbows should not be behind your body  Keep your arms parallel to the floor and slowly straighten your elbows  Hold this position  Slowly return to the starting position  When should I call my doctor or physical therapist?   · You have sharp or worsening pain during exercise or at rest     · You have questions or concerns about your rotator cuff injury exercises  CARE AGREEMENT:   You have the right to help plan your care  Learn about your health condition and how it may be treated  Discuss treatment options with your healthcare providers to decide what care you want to receive  You always have the right to refuse treatment  The above information is an  only  It is not intended as medical advice for individual conditions or treatments  Talk to your doctor, nurse or pharmacist before following any medical regimen to see if it is safe and effective for you    © Copyright [employer] 2021 Information is for End User's use only and may not be sold, redistributed or otherwise used for commercial purposes   All illustrations and images included in CareNotes® are the copyrighted property of A D A M , Inc  or [facility] Justina Zuleta

## 2022-01-05 NOTE — PROGRESS NOTES
Assessment/Plan:    1  Malignant neoplasm of right breast in female, estrogen receptor positive, unspecified site of breast [facility])  - she has adequate f/u with Tess Foster MD - hem/onc with [facility]  Most recent note reviewed via Care Everywhere  She is now off of tamoxifen, completed 5 years of this  She has continuous surveillance of her breasts per protocol as well, she is s/p R mastectomy  2  Asymptomatic postmenopausal state  - needs DEXA  This was ordered  Recommended 2000 IUs/day of Vit D3  Will further discuss tx plan pending findings  Would likely benefit from - calcium 1200 mg daily as well  - DXA bone density spine hip and pelvis; Future    3  Pre-diabetes  - had X 1 A1C of 6 5  She is not on Meds  We will recheck this in a few months  Discussed importance of healthy diet, low sugar/modified in CHO, etc   She acknowledges this  She is not able to do routine exercise due to aches and pains  Had [facility] and this was wnl  She is on ARB therapy  She is on statin  Lab Results   Component Value Date    HGBA1C 5 9 (H) 10/25/2021   - HEMOGLOBIN A1C W/ EAG ESTIMATION; Future    4  Mood disorder ([facility] [address] )  - she is very tearful today, looks like there is h/o major depression  We will get her restarted on her Effexor XR  She tells me her dose that she did best on was 75 mg/day  She is without si/hi  Asked that she phone the clinic should she be close to running out of this in the future as sometimes we do not hear from the pharmacy  - venlafaxine (EFFEXOR-XR) 75 mg 24 hr capsule; Take 1 capsule (75 mg total) by mouth daily with breakfast  Dispense: 90 capsule; Refill: 1    5  Vitamin D deficiency  - Cholecalciferol 50 MCG (2000 UT) TABS; Take 1 tablet (2,000 Units total) by mouth every morning  Dispense: 90 tablet; Refill: 3  - Vitamin D 25 hydroxy; Future    6  Gastroesophageal reflux disease without esophagitis  - on PPI therapy  Only takes as needed, not routinely    Discussed safer options, diet control/Pepcid  - omeprazole (PriLOSEC) 20 mg delayed release capsule; Take 1 capsule (20 mg total) by mouth daily  Dispense: 90 capsule; Refill: 1    7  Primary hypertension  - stable on ARB therapy  Risk factor modifications discussed  - Basic metabolic panel; Future    8  R shoulder bursitis - will do HEP, offered formal PT, she declined  Emphasized the importance of moving and being active with the shoulder  Will trial prn diclofenac gel, safer alternative  If failing and persistent pain, can trial PT/can get XR  9  L thumb OA - conservative management/topical NSAIDs  Seizure Disorder - has been well maintained on keppra  I advised her to stay with her Neurologist as it can be very difficult in the long run to get plugged back in  She is on board  RTC in 6m or sooner prn  Patient/Caretaker verbalized understanding and were in agreement with today's assessment and plan  Time was taken to address any questions patient/caretaker had  Indication/Risks/Benefits of medication(s) as prescribed were discussed with the patient/caretaker  The patient verbalized understanding and agreement and elects to take medications as prescribed  Time was taken to answer any questions the patient/caretaker may have had  Total time I spent caring for this patient on the day of the encounter - 40 minutes  5 minutes spent before the visit  25 minutes spent during the visit  10 minutes spent after the visit - documenting/chart review       Chief Complaint   Patient presents with    Follow-up     follow up on emg, results in care everywhere    Medication Problem     has not been taking effexor for about 3 weeks    Arthritis     c/o increased body and joint pains    bmi follow up       Subjective:      Patient ID: Grace Starr is a 66 y o  female  Patient is very tearful today due to not having her anx/depression medication  She has h/o anx/depression    On this medication, she felt much improved quality of life  She lives at home alone  She does have 2 daughters and grandchildren whom she enjoys  She recently lost a loved on in Nov and it has been extremely difficult for her  No si/hi  She would like to get back to the 75 mg dose  Pre-DM -- not on medications for this and prefers this  She is working to be good with her diet  Cannot do routine exercise due to pain  Lab Results   Component Value Date    HGBA1C 5 9 (H) 10/25/2021     R shoulder pain and popping over the past 2 months  No RUDY  She is R hand dominant  There is weakness at times but she is not dropping things, etc   She is pointing to ant shoulder area  She can move it but when gets near end range, it is very sore  Not waking her up at night  No n/t  There is L thumb pain -- known CMC OA  There is some weakness with this  Breast Ca - sees her oncologist for this  She has yearly mammograms  She is s/p R breast removal   No issues or problems to report with this  She is no longer on HRT - fulfilled 5y of therapy  HTN -- Taking her meds as Rx     Myalgias, low back and pelvic pain  Chronic  HLD - on simvastatin and takes this daily  Seizure Disorder - on Keppra and has not had a seizure in 5 years  She does see a neurologist       The following portions of the patient's history were reviewed and updated as appropriate: allergies, current medications, past family history, past medical history, past social history, past surgical history and problem list     Review of Systems   Constitutional: Positive for activity change and fatigue  Negative for appetite change  HENT: Negative for congestion, sinus pressure and sinus pain  Respiratory: Negative for cough, choking, chest tightness, shortness of breath and wheezing  Cardiovascular: Negative for chest pain and leg swelling  Gastrointestinal: Negative for abdominal pain and nausea     Genitourinary: Negative for urgency  Musculoskeletal: Positive for arthralgias, gait problem and myalgias  Skin: Negative for rash  Neurological: Negative for headaches  Psychiatric/Behavioral: Positive for dysphoric mood and sleep disturbance  Negative for self-injury and suicidal ideas  The patient is nervous/anxious  Objective:      /82   Pulse 91   Temp 98 °F (36 7 °C)   Ht 5' 6" (1 676 m)   Wt 90 3 kg (199 lb)   SpO2 98%   BMI 32 12 kg/m²          Physical Exam  Vitals and nursing note reviewed  Constitutional:       General: She is not in acute distress  Appearance: Normal appearance  She is not ill-appearing or toxic-appearing  Comments: Very tearful     HENT:      Head: Normocephalic and atraumatic  Eyes:      Conjunctiva/sclera: Conjunctivae normal    Neck:      Comments: There is a mobile soft tissue mass just below the SC area, pt tells me it has been there from ~ 13 years  Cardiovascular:      Rate and Rhythm: Normal rate and regular rhythm  Pulses: Normal pulses  Heart sounds: Normal heart sounds  Pulmonary:      Effort: Pulmonary effort is normal       Breath sounds: Normal breath sounds  No wheezing, rhonchi or rales  Abdominal:      General: Bowel sounds are normal       Palpations: Abdomen is soft  Musculoskeletal:      Cervical back: Neck supple  Comments: No obvious abnormality to shoulder/thumb  There is ttp over the [facility] joint on the L and the ant shoulder  There are chronic paraspinal tissue texture changes noted  There is dec ROM of the shoulder, near end range that is also very painful for patient   There is dec strength with provocative testing of the R shoulder and pain  Sensation is intact to b/l UEs     Lymphadenopathy:      Cervical: No cervical adenopathy  Skin:     General: Skin is warm and dry  Neurological:      General: No focal deficit present  Mental Status: She is alert     Psychiatric:         Behavior: Behavior normal  Thought Content:  Thought content normal          Judgment: Judgment normal       Comments: Flat affect, congruent with mood

## 2022-01-07 ENCOUNTER — TELEPHONE (OUTPATIENT)
Dept: ADMINISTRATIVE | Facility: OTHER | Age: 72
End: 2022-01-07

## 2022-01-07 NOTE — TELEPHONE ENCOUNTER
----- Message from Randall Loo MA sent at 1/7/2022  7:35 AM EST -----  Regarding: care gap request  01/07/22 7:35 AM    Hello, our patient attached above has had DEXA Scan completed/performed  Please assist in updating the patient chart by pulling the Care Everywhere (CE) document  The date of service is 2022       Thank you,  Randall Loo MA  [facility] FP

## 2022-01-07 NOTE — TELEPHONE ENCOUNTER
Upon review of the In Basket request we have found/obtained the documentation  After careful review of the document we are unable to complete this request for DEXA Scan because the documentation does not have the result(s) needed to close the requested care gap(s)  1/5/2022 was an order placed not a report     Any additional questions or concerns should be emailed to the Practice Liaisons via [email or web address] email, please do not reply via In Basket      Thank you  Russ Hill MA

## 2022-02-07 ENCOUNTER — DOCTOR'S OFFICE (OUTPATIENT)
Dept: URBAN - NONMETROPOLITAN AREA CLINIC 1 | Facility: CLINIC | Age: 72
Setting detail: OPHTHALMOLOGY
End: 2022-02-07
Payer: MEDICARE

## 2022-02-07 VITALS — HEIGHT: 55 IN

## 2022-02-07 DIAGNOSIS — H04.123: ICD-10-CM

## 2022-02-07 DIAGNOSIS — H43.813: ICD-10-CM

## 2022-02-07 DIAGNOSIS — Z96.1: ICD-10-CM

## 2022-02-07 PROCEDURE — 92014 COMPRE OPH EXAM EST PT 1/>: CPT | Performed by: OPHTHALMOLOGY

## 2022-02-07 PROCEDURE — 92134 CPTRZ OPH DX IMG PST SGM RTA: CPT | Performed by: OPHTHALMOLOGY

## 2022-02-07 ASSESSMENT — PUNCTA - ASSESSMENT
OS_PUNCTA: SIL PLUG LLL SMALL
OD_PUNCTA: SIL PLUG RLL SMALL

## 2022-02-07 ASSESSMENT — REFRACTION_AUTOREFRACTION
OS_CYLINDER: -1.25
OD_CYLINDER: -2.00
OS_SPHERE: +1.25
OD_AXIS: 176
OS_AXIS: 025
OD_SPHERE: +1.00

## 2022-02-07 ASSESSMENT — CONFRONTATIONAL VISUAL FIELD TEST (CVF)
OS_FINDINGS: FULL
OD_FINDINGS: FULL

## 2022-02-07 ASSESSMENT — KERATOMETRY
OD_K2POWER_DIOPTERS: 48.00
OD_K1POWER_DIOPTERS: 45.75
OS_K1POWER_DIOPTERS: 46.00
OD_AXISANGLE_DEGREES: 092
OS_AXISANGLE_DEGREES: 102
OS_K2POWER_DIOPTERS: 47.75

## 2022-02-07 ASSESSMENT — REFRACTION_MANIFEST
OS_CYLINDER: -1.00
OU_VA: 20/20
OS_ADD: +2.50
OD_VA2: 20/25
OD_CYLINDER: -1.25
OD_ADD: +2.50
OD_VA1: 20/20
OD_SPHERE: +0.25
OD_AXIS: 180
OS_VA2: 20/25
OS_SPHERE: +0.25
OS_AXIS: 025
OS_VA1: 20/20

## 2022-02-07 ASSESSMENT — REFRACTION_CURRENTRX
OD_ADD: +2.75
OD_AXIS: 178
OS_AXIS: 020
OD_SPHERE: +0.25
OS_ADD: +2.75
OS_VPRISM_DIRECTION: TRF
OS_OVR_VA: 20/
OD_CYLINDER: -1.25
OS_CYLINDER: -1.00
OD_OVR_VA: 20/
OD_VPRISM_DIRECTION: TRF
OS_SPHERE: +0.25

## 2022-02-07 ASSESSMENT — SPHEQUIV_DERIVED
OS_SPHEQUIV: -0.25
OD_SPHEQUIV: -0.375
OD_SPHEQUIV: 0
OS_SPHEQUIV: 0.625

## 2022-02-07 ASSESSMENT — SUPERFICIAL PUNCTATE KERATITIS (SPK)
OS_SPK: T
OD_SPK: T

## 2022-02-07 ASSESSMENT — AXIALLENGTH_DERIVED
OS_AL: 22.1965
OS_AL: 22.5024
OD_AL: 22.5468
OD_AL: 22.4141

## 2022-02-07 ASSESSMENT — VISUAL ACUITY
OS_BCVA: 20/20-2
OD_BCVA: 20/25-2

## 2022-02-07 ASSESSMENT — LID EXAM ASSESSMENTS
OS_BLEPHARITIS: ABSENT
OD_BLEPHARITIS: ABSENT

## 2022-03-17 ENCOUNTER — TELEPHONE (OUTPATIENT)
Dept: ADMINISTRATIVE | Facility: OTHER | Age: 72
End: 2022-03-17

## 2022-03-17 NOTE — TELEPHONE ENCOUNTER
Upon review of the In Basket request we were able to locate, review, and update the patient chart as requested for Mammogram     Any additional questions or concerns should be emailed to the Practice Liaisons via [email or web address] email, please do not reply via In Basket      Thank you  Marco Antonio Sanchez MA

## 2022-03-17 NOTE — TELEPHONE ENCOUNTER
----- Message from Nasima Blanco sent at 3/17/2022  7:57 AM EDT -----  Regarding: Care Gap Request  03/17/22 7:57 AM    Hello, our patient attached above has had mammogram completed/performed  Please assist in updating the patient chart    The date of service is 2022, in care everywhere     Thank you,  Nasima Blanco  [facility]

## 2022-04-04 ENCOUNTER — DOCTOR'S OFFICE (OUTPATIENT)
Dept: URBAN - NONMETROPOLITAN AREA CLINIC 1 | Facility: CLINIC | Age: 72
Setting detail: OPHTHALMOLOGY
End: 2022-04-04

## 2022-04-04 DIAGNOSIS — H52.223: ICD-10-CM

## 2022-04-04 DIAGNOSIS — H52.4: ICD-10-CM

## 2022-04-04 PROBLEM — H04.121 DRY EYE SYNDROME LACRIMAL GLAND; RIGHT EYE, LEFT EYE: Status: ACTIVE | Noted: 2017-08-02

## 2022-04-04 PROBLEM — H04.122 DRY EYE SYNDROME LACRIMAL GLAND; RIGHT EYE, LEFT EYE: Status: ACTIVE | Noted: 2017-08-02

## 2022-04-04 PROBLEM — Z96.1: Status: ACTIVE | Noted: 2017-09-15

## 2022-04-04 PROBLEM — H43.813 POSTERIOR VITREOUS DETACHMENT; BOTH EYES: Status: ACTIVE | Noted: 2022-02-07

## 2022-04-04 PROCEDURE — 92015 DETERMINE REFRACTIVE STATE: CPT | Performed by: OPTOMETRIST

## 2022-04-04 ASSESSMENT — REFRACTION_MANIFEST
OD_CYLINDER: -1.25
OD_VA2: 20/25
OD_VA1: 20/20
OD_AXIS: 180
OS_VA2: 20/25
OU_VA: 20/20
OD_SPHERE: +0.25
OS_AXIS: 025
OS_SPHERE: +0.50
OD_ADD: +2.50
OS_CYLINDER: -1.25
OS_ADD: +2.50
OS_VA1: 20/20

## 2022-04-04 ASSESSMENT — REFRACTION_CURRENTRX
OD_OVR_VA: 20/
OS_OVR_VA: 20/
OS_AXIS: 025
OD_SPHERE: +0.25
OD_VPRISM_DIRECTION: TRF
OS_SPHERE: +0.25
OS_VPRISM_DIRECTION: TRF
OD_CYLINDER: -1.25
OD_ADD: +2.75
OD_AXIS: 180
OS_CYLINDER: -1.00
OS_ADD: +2.75

## 2022-04-04 ASSESSMENT — REFRACTION_AUTOREFRACTION
OS_SPHERE: +1.25
OD_AXIS: 176
OS_CYLINDER: -1.25
OD_SPHERE: +1.00
OS_AXIS: 025
OD_CYLINDER: -2.00

## 2022-04-04 ASSESSMENT — KERATOMETRY
OS_K1POWER_DIOPTERS: 46.00
OD_K1POWER_DIOPTERS: 45.75
OD_AXISANGLE_DEGREES: 092
OS_K2POWER_DIOPTERS: 47.75
OD_K2POWER_DIOPTERS: 48.00
OS_AXISANGLE_DEGREES: 102

## 2022-04-04 ASSESSMENT — AXIALLENGTH_DERIVED
OD_AL: 22.5468
OD_AL: 22.4141
OS_AL: 22.4582
OS_AL: 22.1965

## 2022-04-04 ASSESSMENT — VISUAL ACUITY
OD_BCVA: 20/25-2
OS_BCVA: 20/25+2

## 2022-04-04 ASSESSMENT — SPHEQUIV_DERIVED
OS_SPHEQUIV: 0.625
OD_SPHEQUIV: -0.375
OD_SPHEQUIV: 0
OS_SPHEQUIV: -0.125

## 2022-04-05 ENCOUNTER — TELEPHONE (OUTPATIENT)
Dept: ADMINISTRATIVE | Facility: OTHER | Age: 72
End: 2022-04-05

## 2022-04-05 NOTE — TELEPHONE ENCOUNTER
Upon review of the In Basket request we were able to locate, review, and update the patient chart as requested for DEXA Scan  Any additional questions or concerns should be emailed to the Practice Liaisons via [email or web address] email, please do not reply via In Basket      Thank you  Angela Johnson

## 2022-04-05 NOTE — TELEPHONE ENCOUNTER
----- Message from Dharmesh Peacock, Gurvinder Yancey Morgan sent at 4/4/2022  2:42 PM EDT -----  Regarding: Care Gap request  04/04/22 2:42 PM    Hello, our patient attached above has had DEXA Scan completed/performed  Please assist in updating the patient chart by pulling the Care Everywhere (CE) document  The date of service is 2022       Thank you,  Dharmesh Peacock, MA  [facility]

## 2022-04-25 DIAGNOSIS — E11.9 TYPE 2 DIABETES MELLITUS WITHOUT COMPLICATION, WITHOUT LONG-TERM CURRENT USE OF INSULIN (HCC): ICD-10-CM

## 2022-04-25 RX ORDER — SIMVASTATIN 40 MG
TABLET ORAL
Qty: 30 TABLET | Refills: 5 | Status: SHIPPED | OUTPATIENT
Start: 2022-04-25

## 2022-05-09 ENCOUNTER — LAB (OUTPATIENT)
Dept: LAB | Facility: CLINIC | Age: 72
End: 2022-05-09
Payer: COMMERCIAL

## 2022-05-09 DIAGNOSIS — R73.03 PRE-DIABETES: ICD-10-CM

## 2022-05-09 DIAGNOSIS — Z76.89 ESTABLISHING CARE WITH NEW DOCTOR, ENCOUNTER FOR: ICD-10-CM

## 2022-05-09 DIAGNOSIS — I10 PRIMARY HYPERTENSION: ICD-10-CM

## 2022-05-09 DIAGNOSIS — I10 ESSENTIAL HYPERTENSION: ICD-10-CM

## 2022-05-09 DIAGNOSIS — R56.9 SEIZURE (HCC): ICD-10-CM

## 2022-05-09 DIAGNOSIS — Z11.59 NEED FOR HEPATITIS C SCREENING TEST: ICD-10-CM

## 2022-05-09 DIAGNOSIS — E55.9 VITAMIN D DEFICIENCY: ICD-10-CM

## 2022-05-09 DIAGNOSIS — E78.5 DYSLIPIDEMIA, GOAL LDL BELOW 130: ICD-10-CM

## 2022-05-09 LAB
25(OH)D3 SERPL-MCNC: 32.2 NG/ML (ref 30–100)
ANION GAP SERPL CALCULATED.3IONS-SCNC: 5 MMOL/L (ref 4–13)
BASOPHILS # BLD AUTO: 0.04 THOUSANDS/ΜL (ref 0–0.1)
BASOPHILS NFR BLD AUTO: 1 % (ref 0–1)
BUN SERPL-MCNC: 26 MG/DL (ref 5–25)
CALCIUM SERPL-MCNC: 9.4 MG/DL (ref 8.3–10.1)
CHLORIDE SERPL-SCNC: 110 MMOL/L (ref 100–108)
CHOLEST SERPL-MCNC: 220 MG/DL
CO2 SERPL-SCNC: 26 MMOL/L (ref 21–32)
CREAT SERPL-MCNC: 0.77 MG/DL (ref 0.6–1.3)
EOSINOPHIL # BLD AUTO: 0.16 THOUSAND/ΜL (ref 0–0.61)
EOSINOPHIL NFR BLD AUTO: 3 % (ref 0–6)
ERYTHROCYTE [DISTWIDTH] IN BLOOD BY AUTOMATED COUNT: 12.8 % (ref 11.6–15.1)
EST. AVERAGE GLUCOSE BLD GHB EST-MCNC: 128 MG/DL
GFR SERPL CREATININE-BSD FRML MDRD: 76 ML/MIN/1.73SQ M
GLUCOSE P FAST SERPL-MCNC: 132 MG/DL (ref 65–99)
HBA1C MFR BLD: 6.1 %
HCT VFR BLD AUTO: 42.5 % (ref 34.8–46.1)
HCV AB SER QL: NORMAL
HDLC SERPL-MCNC: 72 MG/DL
HGB BLD-MCNC: 13.5 G/DL (ref 11.5–15.4)
IMM GRANULOCYTES # BLD AUTO: 0.03 THOUSAND/UL (ref 0–0.2)
IMM GRANULOCYTES NFR BLD AUTO: 1 % (ref 0–2)
LDLC SERPL CALC-MCNC: 99 MG/DL (ref 0–100)
LYMPHOCYTES # BLD AUTO: 1.49 THOUSANDS/ΜL (ref 0.6–4.47)
LYMPHOCYTES NFR BLD AUTO: 31 % (ref 14–44)
MCH RBC QN AUTO: 28.2 PG (ref 26.8–34.3)
MCHC RBC AUTO-ENTMCNC: 31.8 G/DL (ref 31.4–37.4)
MCV RBC AUTO: 89 FL (ref 82–98)
MONOCYTES # BLD AUTO: 0.35 THOUSAND/ΜL (ref 0.17–1.22)
MONOCYTES NFR BLD AUTO: 7 % (ref 4–12)
NEUTROPHILS # BLD AUTO: 2.76 THOUSANDS/ΜL (ref 1.85–7.62)
NEUTS SEG NFR BLD AUTO: 57 % (ref 43–75)
NRBC BLD AUTO-RTO: 0 /100 WBCS
PLATELET # BLD AUTO: 196 THOUSANDS/UL (ref 149–390)
PMV BLD AUTO: 10.7 FL (ref 8.9–12.7)
POTASSIUM SERPL-SCNC: 4.5 MMOL/L (ref 3.5–5.3)
RBC # BLD AUTO: 4.79 MILLION/UL (ref 3.81–5.12)
SODIUM SERPL-SCNC: 141 MMOL/L (ref 136–145)
TRIGL SERPL-MCNC: 245 MG/DL
TSH SERPL DL<=0.05 MIU/L-ACNC: 1.99 UIU/ML (ref 0.45–4.5)
WBC # BLD AUTO: 4.83 THOUSAND/UL (ref 4.31–10.16)

## 2022-05-09 PROCEDURE — 36415 COLL VENOUS BLD VENIPUNCTURE: CPT

## 2022-05-09 PROCEDURE — 86803 HEPATITIS C AB TEST: CPT

## 2022-05-09 PROCEDURE — 82306 VITAMIN D 25 HYDROXY: CPT

## 2022-05-09 PROCEDURE — 85025 COMPLETE CBC W/AUTO DIFF WBC: CPT

## 2022-05-09 PROCEDURE — 80048 BASIC METABOLIC PNL TOTAL CA: CPT

## 2022-05-09 PROCEDURE — 83036 HEMOGLOBIN GLYCOSYLATED A1C: CPT

## 2022-05-09 PROCEDURE — 80177 DRUG SCRN QUAN LEVETIRACETAM: CPT

## 2022-05-09 PROCEDURE — 80061 LIPID PANEL: CPT

## 2022-05-09 PROCEDURE — 84443 ASSAY THYROID STIM HORMONE: CPT

## 2022-05-12 LAB — LEVETIRACETAM SERPL-MCNC: 11 UG/ML (ref 10–40)

## 2022-06-24 DIAGNOSIS — G40.909 SEIZURE DISORDER (HCC): ICD-10-CM

## 2022-06-24 DIAGNOSIS — I10 ESSENTIAL HYPERTENSION: ICD-10-CM

## 2022-06-24 DIAGNOSIS — R56.9 GENERALIZED CONVULSIVE SEIZURE (HCC): ICD-10-CM

## 2022-06-24 RX ORDER — LOSARTAN POTASSIUM 50 MG/1
TABLET ORAL
Qty: 90 TABLET | Refills: 3 | Status: SHIPPED | OUTPATIENT
Start: 2022-06-24

## 2022-06-29 ENCOUNTER — RA CDI HCC (OUTPATIENT)
Dept: OTHER | Facility: HOSPITAL | Age: 72
End: 2022-06-29

## 2022-06-29 NOTE — PROGRESS NOTES
[facility]  coding opportunities       Chart reviewed, no opportunity found:   [address]        Patients Insurance     Medicare Insurance: [employer]

## 2022-07-01 DIAGNOSIS — F39 MOOD DISORDER (HCC): ICD-10-CM

## 2022-07-01 DIAGNOSIS — K21.9 GASTROESOPHAGEAL REFLUX DISEASE WITHOUT ESOPHAGITIS: ICD-10-CM

## 2022-07-01 RX ORDER — OMEPRAZOLE 20 MG/1
CAPSULE, DELAYED RELEASE ORAL
Qty: 90 CAPSULE | Refills: 1 | Status: SHIPPED | OUTPATIENT
Start: 2022-07-01

## 2022-07-01 RX ORDER — VENLAFAXINE HYDROCHLORIDE 75 MG/1
CAPSULE, EXTENDED RELEASE ORAL
Qty: 90 CAPSULE | Refills: 1 | Status: SHIPPED | OUTPATIENT
Start: 2022-07-01 | End: 2022-07-06 | Stop reason: SDUPTHER

## 2022-07-06 ENCOUNTER — OFFICE VISIT (OUTPATIENT)
Dept: FAMILY MEDICINE CLINIC | Facility: CLINIC | Age: 72
End: 2022-07-06
Payer: COMMERCIAL

## 2022-07-06 VITALS
DIASTOLIC BLOOD PRESSURE: 80 MMHG | BODY MASS INDEX: 31.98 KG/M2 | OXYGEN SATURATION: 97 % | HEART RATE: 85 BPM | SYSTOLIC BLOOD PRESSURE: 132 MMHG | WEIGHT: 199 LBS | HEIGHT: 66 IN

## 2022-07-06 DIAGNOSIS — I10 PRIMARY HYPERTENSION: ICD-10-CM

## 2022-07-06 DIAGNOSIS — E78.2 MIXED HYPERLIPIDEMIA: ICD-10-CM

## 2022-07-06 DIAGNOSIS — R73.03 PRE-DIABETES: Primary | ICD-10-CM

## 2022-07-06 DIAGNOSIS — M85.80 OSTEOPENIA, UNSPECIFIED LOCATION: ICD-10-CM

## 2022-07-06 DIAGNOSIS — F39 MOOD DISORDER (HCC): ICD-10-CM

## 2022-07-06 DIAGNOSIS — M75.51 CHRONIC BURSITIS OF RIGHT SHOULDER: ICD-10-CM

## 2022-07-06 DIAGNOSIS — M18.12 ARTHRITIS OF CARPOMETACARPAL (CMC) JOINT OF LEFT THUMB: ICD-10-CM

## 2022-07-06 PROCEDURE — 3075F SYST BP GE 130 - 139MM HG: CPT | Performed by: FAMILY MEDICINE

## 2022-07-06 PROCEDURE — 3079F DIAST BP 80-89 MM HG: CPT | Performed by: FAMILY MEDICINE

## 2022-07-06 PROCEDURE — 3725F SCREEN DEPRESSION PERFORMED: CPT | Performed by: FAMILY MEDICINE

## 2022-07-06 PROCEDURE — 1003F LEVEL OF ACTIVITY ASSESS: CPT | Performed by: FAMILY MEDICINE

## 2022-07-06 PROCEDURE — 99214 OFFICE O/P EST MOD 30 MIN: CPT | Performed by: FAMILY MEDICINE

## 2022-07-06 RX ORDER — VENLAFAXINE HYDROCHLORIDE 75 MG/1
75 CAPSULE, EXTENDED RELEASE ORAL DAILY
Qty: 90 CAPSULE | Refills: 1 | Status: SHIPPED | OUTPATIENT
Start: 2022-07-06

## 2022-07-06 NOTE — PROGRESS NOTES
Assessment/Plan:    1  Malignant neoplasm of right breast in female, estrogen receptor positive, unspecified site of breast [facility])  - she has adequate f/u with Griffin Bear MD - hem/onc with [facility]  Most recent note reviewed via Care Everywhere  She is now off of tamoxifen, completed 5 years of this  She has continuous surveillance of her breasts per protocol as well, she is s/p R mastectomy  2  Ostopenia   - will have her do:  I recommend calcium 1200 mg daily in divided doses and vitamin D 2000 units daily for improved bone quality  Our recommendations are Calcium Citrate for better absorption  3  Pre-diabetes  - had X 1 A1C of 6 5  She is not on Meds  We discussed imp of diet and exercise to keep from transitioning to DM  She acknowledges this  She is not able to do routine exercise due to aches and pains  Had [facility] and this was wnl  She is on ARB therapy  She is on statin  Lab Results   Component Value Date    HGBA1C 6 1 (H) 05/09/2022     4  Mood disorder ([address] )  - stable on Effexor  7  Primary hypertension  - stable on ARB therapy  Risk factor modifications discussed  8  R shoulder bursitis - cont HEP  Topical Diclofenac Rx sent again  We will see how she does  Offered formal PT/XR - pt declined  Potential candidate for OMT - she can phone and see if this is covered  Seizure Disorder - has been well maintained on keppra  I advised her to stay with her Neurologist as it can be very difficult in the long run to get plugged back in  She is on board  RTC in 6m or sooner prn - for ? OMT    Patient/Caretaker verbalized understanding and were in agreement with today's assessment and plan  Time was taken to address any questions patient/caretaker had  Indication/Risks/Benefits of medication(s) as prescribed were discussed with the patient/caretaker  The patient verbalized understanding and agreement and elects to take medications as prescribed    Time was taken to answer any questions the patient/caretaker may have had  Chief Complaint   Patient presents with    Follow-up     Bmi follow up due  Review labs from 5/2022     BMI Counseling: Body mass index is 32 12 kg/m²  The BMI is above normal  Nutrition recommendations include decreasing portion sizes, encouraging healthy choices of fruits and vegetables, decreasing fast food intake, consuming healthier snacks, limiting drinks that contain sugar, reducing intake of saturated and trans fat and reducing intake of cholesterol  Exercise recommendations include exercising 3-5 times per week and strength training exercises  Rationale for BMI follow-up plan is due to patient being overweight or obese  Depression Screening and Follow-up Plan: Patient was screened for depression during today's encounter  They screened negative with a PHQ-2 score of 0  Subjective:      Patient ID: Dwaine Nicholson is a 66 y o  female  Anx/Depression -- Effexor restarted at prior visit  She lives at home alone  She does have 2 daughters and grandchildren whom she enjoys  She recently lost a loved on in Nov and it has been extremely difficult for her  No si/hi  She is improved  Pre-DM -- not on medications for this and prefers this  She is working to be good with her diet  Cannot do routine exercise due to pain  Acknowledges she needs to be better with diet  Lab Results   Component Value Date    HGBA1C 6 1 (H) 05/09/2022     R shoulder pain/bursitis  Doing HEP and somewhat better but still with pain across her shoulder blade/ant chest wall at times and points to her R trap/rhomboid area  She is R hand dominant  There is weakness at times but she is not dropping things, etc   Not waking her up at night  No n/t  There is L thumb pain -- known CMC OA  There is some weakness with this  Breast Ca - sees her oncologist for this  She has yearly mammograms    She is s/p R breast removal   No issues or problems to report with this  She is no longer on HRT - fulfilled 5y of therapy  HTN -- Taking her meds as Rx     Myalgias, low back and pelvic pain  Chronic  HLD - on simvastatin and takes this daily  Lipids are not optimally controlled - reviewed with her today  Seizure Disorder - on Keppra and has not had a seizure in 5 years  She does see a neurologist       The following portions of the patient's history were reviewed and updated as appropriate: allergies, current medications, past family history, past medical history, past social history, past surgical history and problem list     Review of Systems   All other systems reviewed and are negative  Objective:      /80   Pulse 85   Ht 5' 6" (1 676 m)   Wt 90 3 kg (199 lb)   SpO2 97%   BMI 32 12 kg/m²          Physical Exam  Vitals and nursing note reviewed  Constitutional:       General: She is not in acute distress  Appearance: Normal appearance  She is not ill-appearing or toxic-appearing  Comments: Very tearful     HENT:      Head: Normocephalic and atraumatic  Eyes:      Conjunctiva/sclera: Conjunctivae normal    Neck:      Comments: There is a mobile soft tissue mass just below the SC area, pt tells me it has been there from ~ 13 years  Cardiovascular:      Rate and Rhythm: Normal rate and regular rhythm  Pulses: Normal pulses  Heart sounds: Normal heart sounds  Pulmonary:      Effort: Pulmonary effort is normal       Breath sounds: Normal breath sounds  No wheezing, rhonchi or rales  Abdominal:      General: Bowel sounds are normal       Palpations: Abdomen is soft  Musculoskeletal:      Cervical back: Neck supple        Comments: No obvious abnormality to shoulder/thumb  There is ttp over the [facility] joint on the L and the ant shoulder  There are chronic paraspinal tissue texture changes noted  There is dec ROM of the shoulder, near end range that is also very painful for patient   There is dec strength with provocative testing of the R shoulder and pain  Sensation is intact to b/l UEs     Lymphadenopathy:      Cervical: No cervical adenopathy  Skin:     General: Skin is warm and dry  Neurological:      General: No focal deficit present  Mental Status: She is alert and oriented to person, place, and time  Psychiatric:         Mood and Affect: Mood normal          Behavior: Behavior normal          Thought Content:  Thought content normal          Judgment: Judgment normal

## 2022-07-06 NOTE — PATIENT INSTRUCTIONS
OSTEOPATHIC MANIPULATIVE MEDICINE OR TREATMENT (OMM OR OMT) - CHECK WITH INSURANCE IF THIS IS COVERED  IF SO, CALL BACK AND TELL THEM YOU NEED A MANIPULATION APPOINTMENT     Bone Health:  I recommend calcium 1200 mg daily in divided doses and vitamin D 2000 units daily for improved bone quality  Our recommendations are Calcium Citrate for better absorption  Mediterranean Diet   AMBULATORY CARE:   A Mediterranean diet  is a meal plan that includes foods that are commonly eaten in countries that border the [place]  This meal plan may provide several health benefits  These include losing or maintaining weight, and decreasing blood pressure, blood sugar, and cholesterol levels  It may also help protect against certain health conditions such as heart disease, cancer, type 2 diabetes, and Alzheimer disease  Work with a dietitian to develop a meal plan that is right for you  Foods to include in the [address] diet:   Include fruits and vegetables in each meal   Eat a variety of fresh fruits and vegetables  Choose whole grains every day  These foods include whole-grain breads, pastas, and cereals  It also includes brown rice, quinoa, and millet  Use unsaturated fats instead of saturated fats  Cook with olive or canola oil  Limit saturated fats, such as butter, margarine, and shortening  Saturated fat is an unhealthy fat that can increase your cholesterol levels  Choose plant foods, poultry, and fish as your main sources of protein  Eat plant-based foods that provide protein,  such as lentils, beans, chickpeas, nuts, and seeds  Choose mostly plant-based foods in place of meat on most days of the week  Eat protein foods high in omega-3 fats  Fish high in omega-3 fats include salmon, trout, and tuna  Include these types of fish 1 or 2 times each week  Limit fish high in mercury, such as shark, swordfish, tilefish, and francesca mackerel  Omega-3 fats are also found in walnuts and flaxseed  Choose poultry (chicken or turkey)  without skin instead of red meat  Red meat is high in saturated fat  Limit eggs and high-fat meats, such as hayden, sausage, and hot dogs  Choose low-fat dairy foods  such as nonfat or 1% milk, or low-fat almond, cashew, or soy milk  Other examples include low-fat cheese, yogurt, and cottage cheese  Limit sweets  Limit your intake of high-sugar foods, such as soda, desserts, and candy  Talk to your healthcare provider about alcohol  Studies have shown that moderate intake of wine may reduce the risk of heart disease  A moderate amount of wine is 1 serving for women and men 65 years and older each day  Two servings is recommended for men 24to 59years of age each day  A serving of wine is 5 ounces  Other things you need to know if you follow the Mediterranean diet:   Include foods high in iron and vitamin C   Plant-based foods that are high in iron include spinach, beans, tofu, and artichoke  Eat a serving of vitamin C with any iron-rich food to help your body absorb more iron  Examples include oranges, strawberries, cantaloupe, broccoli, and yellow peppers  Get regular physical activity  The Mediterranean diet will have the most benefit if you get regular physical activity  Get 30 minutes of physical activity at least 5 days a week  Choose physical activities that increase your heart rate  Examples include walking, hiking, swimming, and riding a bike  Ask your healthcare provider about the best exercise plan for you  © Copyright [employer] 2022 Information is for End User's use only and may not be sold, redistributed or otherwise used for commercial purposes  All illustrations and images included in CareNotes® are the copyrighted property of [employer]  or Maureen Mccartney   The above information is an  only  It is not intended as medical advice for individual conditions or treatments   Talk to your doctor, nurse or pharmacist before following any medical regimen to see if it is safe and effective for you

## 2022-11-18 ENCOUNTER — OFFICE VISIT (OUTPATIENT)
Dept: FAMILY MEDICINE CLINIC | Facility: CLINIC | Age: 72
End: 2022-11-18

## 2022-11-18 VITALS
HEIGHT: 66 IN | RESPIRATION RATE: 18 BRPM | DIASTOLIC BLOOD PRESSURE: 72 MMHG | SYSTOLIC BLOOD PRESSURE: 149 MMHG | TEMPERATURE: 98.4 F | HEART RATE: 66 BPM | OXYGEN SATURATION: 97 % | BODY MASS INDEX: 33.01 KG/M2 | WEIGHT: 205.4 LBS

## 2022-11-18 DIAGNOSIS — T16.1XXA FOREIGN BODY OF RIGHT EAR, INITIAL ENCOUNTER: ICD-10-CM

## 2022-11-18 DIAGNOSIS — H61.22 IMPACTED CERUMEN OF LEFT EAR: Primary | ICD-10-CM

## 2022-11-18 NOTE — PROGRESS NOTES
Ear cerumen removal    Date/Time: 11/18/2022 2:30 PM  Performed by: Law Ryder DO  Authorized by: Law Ryder DO   Universal Protocol:  Consent: Verbal consent obtained  Risks and benefits: risks, benefits and alternatives were discussed  Consent given by: patient  Time out: Immediately prior to procedure a "time out" was called to verify the correct patient, procedure, equipment, support staff and site/side marked as required  Patient understanding: patient states understanding of the procedure being performed  Patient consent: the patient's understanding of the procedure matches consent given  Patient identity confirmed: verbally with patient      Patient location:  Clinic  Indications / Diagnosis:  L cerumen impaction   Procedure details:     Local anesthetic:  None    Location:  L ear    Procedure type: irrigation with instrumentation      Instrumentation: curette      Approach:  Natural orifice  Post-procedure details:     Complication:  None    Hearing quality:  Improved    Patient tolerance of procedure: Tolerated well, no immediate complications    Universal Protocol:  Consent: Verbal consent obtained  Risks and benefits: risks, benefits and alternatives were discussed  Consent given by: patient  Time out: Immediately prior to procedure a "time out" was called to verify the correct patient, procedure, equipment, support staff and site/side marked as required    Patient understanding: patient states understanding of the procedure being performed  Patient consent: the patient's understanding of the procedure matches consent given  Procedure consent: procedure consent matches procedure scheduled  Patient identity confirmed: verbally with patient    Foreign body removal    Date/Time: 11/18/2022 1:30 PM  Performed by: Law Ryder DO  Authorized by: Law Ryder DO   Body area: ear  Location details: right ear    Sedation:  Patient sedated: no  Patient restrained: no  Localization method: probed and visualized  Removal mechanism: forceps and curette  Complexity: simple  1 objects recovered  Objects recovered: hearing aide piece - ear tip   Post-procedure assessment: foreign body removed  Patient tolerance: patient tolerated the procedure well with no immediate complications      Name: Anamaria Layne      :       MRN: [id number]  Encounter Provider: Xiomara Canales DO  Encounter Date: 2022   Encounter department: [address] [place] [address]     1  Impacted cerumen of left ear  carbamide peroxide (DEBROX) 6 5 % otic solution    Ear cerumen removal      2  Foreign body of right ear, initial encounter  Foreign body removal        1  Impacted cerumen of left ear  carbamide peroxide (DEBROX) 6 5 % otic solution    Ear cerumen removal      2  Foreign body of right ear, initial encounter  Foreign body removal        Removed tip to her haring aid from R ear  This took ~ 15 min and I used forceps and ear currette  Hearing/discomfort immediately improved s/p removal   The ear canal and TM were wnl on direct visualization s/p removal       L ear cerumen removed without complication  RTC as scheduled or sooner prn    Patient/Caretaker verbalized understanding and were in agreement with today's assessment and plan  Time was taken to address any questions patient/caretaker had  Chief Complaint   Patient presents with   • Hearing Problem     Pt has something stuck in her ear       Subjective      On Thursday, noted something stuck in her R ear  Realized it was the end piece to her hearing aid  She noted a friend saw it in there, could not remove it  No harsha pain, drainage, f/c/s, etc   She was told she had cerumen impaction to the L ear by audiology, would like to have it assessed and removed, if necessary  Review of Systems   All other systems reviewed and are negative        Current Outpatient Medications on File Prior to Visit Medication Sig   • acetaminophen (TYLENOL) 500 mg tablet Take 500 mg by mouth   • aspirin 81 mg chewable tablet Chew 81 mg   • calcium citrate-vitamin D (Calcium Citrate + D3) 315-200 MG-UNIT per tablet Take 2 tablets by mouth 2 (two) times a day   • Cholecalciferol 50 MCG (2000 UT) TABS Take 1 tablet (2,000 Units total) by mouth every morning   • cycloSPORINE (Restasis) 0 05 % ophthalmic emulsion    • levETIRAcetam (KEPPRA) 1000 MG tablet 2 (two) times a day     • losartan (COZAAR) 50 mg tablet take 1 tablet by mouth once daily   • omeprazole (PriLOSEC) 20 mg delayed release capsule take 1 capsule by mouth once daily   • simvastatin (ZOCOR) 40 mg tablet take 1 tablet by mouth at bedtime   • venlafaxine (EFFEXOR-XR) 75 mg 24 hr capsule Take 1 capsule (75 mg total) by mouth daily   • Diclofenac Sodium (VOLTAREN) 1 % Apply 2 g topically 3 (three) times a day as needed (shoulder/knee/thumb pain) (Patient not taking: Reported on 11/18/2022)       Objective     /72 (BP Location: Left arm, Patient Position: Sitting, Cuff Size: Standard)   Pulse 66   Temp 98 4 °F (36 9 °C) (Temporal)   Resp 18   Ht 5' 6" (1 676 m)   Wt 93 2 kg (205 lb 6 4 oz)   SpO2 97%   BMI 33 15 kg/m²     Physical Exam  Vitals and nursing note reviewed  Constitutional:       Appearance: Normal appearance  HENT:      Head: Normocephalic and atraumatic  Ears:      Comments: There is foreign body noted in the R ear canal, about 1/2 in  No drainage, redness, etc   This is occluding the entire canal       L ear canal with cerumen impaction  S/p removal both canals were clear and wnl  Cardiovascular:      Rate and Rhythm: Normal rate  Pulmonary:      Effort: Pulmonary effort is normal    Musculoskeletal:      Cervical back: Neck supple  Lymphadenopathy:      Cervical: No cervical adenopathy  Neurological:      General: No focal deficit present  Mental Status: She is alert and oriented to person, place, and time  Psychiatric:         Mood and Affect: Mood normal          Behavior: Behavior normal          Thought Content:  Thought content normal          Judgment: Judgment normal        Leida Mills DO

## 2023-01-04 DIAGNOSIS — F39 MOOD DISORDER (HCC): ICD-10-CM

## 2023-01-04 DIAGNOSIS — K21.9 GASTROESOPHAGEAL REFLUX DISEASE WITHOUT ESOPHAGITIS: ICD-10-CM

## 2023-01-04 RX ORDER — VENLAFAXINE HYDROCHLORIDE 75 MG/1
CAPSULE, EXTENDED RELEASE ORAL
Qty: 90 CAPSULE | Refills: 1 | Status: SHIPPED | OUTPATIENT
Start: 2023-01-04 | End: 2023-01-09 | Stop reason: SDUPTHER

## 2023-01-04 RX ORDER — OMEPRAZOLE 20 MG/1
CAPSULE, DELAYED RELEASE ORAL
Qty: 90 CAPSULE | Refills: 1 | Status: SHIPPED | OUTPATIENT
Start: 2023-01-04

## 2023-01-09 ENCOUNTER — OFFICE VISIT (OUTPATIENT)
Dept: FAMILY MEDICINE CLINIC | Facility: CLINIC | Age: 73
End: 2023-01-09

## 2023-01-09 VITALS
HEIGHT: 66 IN | SYSTOLIC BLOOD PRESSURE: 177 MMHG | BODY MASS INDEX: 32.62 KG/M2 | WEIGHT: 203 LBS | TEMPERATURE: 96.2 F | HEART RATE: 68 BPM | OXYGEN SATURATION: 97 % | DIASTOLIC BLOOD PRESSURE: 83 MMHG | RESPIRATION RATE: 18 BRPM

## 2023-01-09 DIAGNOSIS — M18.12 ARTHRITIS OF CARPOMETACARPAL (CMC) JOINT OF LEFT THUMB: ICD-10-CM

## 2023-01-09 DIAGNOSIS — K21.9 GASTROESOPHAGEAL REFLUX DISEASE, UNSPECIFIED WHETHER ESOPHAGITIS PRESENT: ICD-10-CM

## 2023-01-09 DIAGNOSIS — Z00.00 MEDICARE ANNUAL WELLNESS VISIT, SUBSEQUENT: Primary | ICD-10-CM

## 2023-01-09 DIAGNOSIS — F39 MOOD DISORDER (HCC): ICD-10-CM

## 2023-01-09 DIAGNOSIS — Z17.0 MALIGNANT NEOPLASM OF RIGHT BREAST IN FEMALE, ESTROGEN RECEPTOR POSITIVE, UNSPECIFIED SITE OF BREAST (HCC): ICD-10-CM

## 2023-01-09 DIAGNOSIS — I10 PRIMARY HYPERTENSION: ICD-10-CM

## 2023-01-09 DIAGNOSIS — G40.909 SEIZURE DISORDER (HCC): ICD-10-CM

## 2023-01-09 DIAGNOSIS — R56.9 GENERALIZED CONVULSIVE SEIZURE (HCC): ICD-10-CM

## 2023-01-09 DIAGNOSIS — C50.911 MALIGNANT NEOPLASM OF RIGHT BREAST IN FEMALE, ESTROGEN RECEPTOR POSITIVE, UNSPECIFIED SITE OF BREAST (HCC): ICD-10-CM

## 2023-01-09 DIAGNOSIS — E78.2 MIXED HYPERLIPIDEMIA: ICD-10-CM

## 2023-01-09 DIAGNOSIS — Z12.31 ENCOUNTER FOR SCREENING MAMMOGRAM FOR BREAST CANCER: ICD-10-CM

## 2023-01-09 DIAGNOSIS — R73.03 PRE-DIABETES: ICD-10-CM

## 2023-01-09 DIAGNOSIS — M75.51 CHRONIC BURSITIS OF RIGHT SHOULDER: ICD-10-CM

## 2023-01-09 DIAGNOSIS — E11.9 TYPE 2 DIABETES MELLITUS WITHOUT COMPLICATION, WITHOUT LONG-TERM CURRENT USE OF INSULIN (HCC): ICD-10-CM

## 2023-01-09 PROBLEM — N39.46 MIXED STRESS AND URGE URINARY INCONTINENCE: Status: ACTIVE | Noted: 2023-01-09

## 2023-01-09 RX ORDER — LOSARTAN POTASSIUM AND HYDROCHLOROTHIAZIDE 25; 100 MG/1; MG/1
1 TABLET ORAL DAILY
Qty: 90 TABLET | Refills: 3 | Status: SHIPPED | OUTPATIENT
Start: 2023-01-09

## 2023-01-09 RX ORDER — LEVETIRACETAM 1000 MG/1
1000 TABLET ORAL 2 TIMES DAILY
Qty: 180 TABLET | Refills: 3 | Status: SHIPPED | OUTPATIENT
Start: 2023-01-09

## 2023-01-09 RX ORDER — VENLAFAXINE HYDROCHLORIDE 75 MG/1
75 CAPSULE, EXTENDED RELEASE ORAL DAILY
Qty: 90 CAPSULE | Refills: 3 | Status: SHIPPED | OUTPATIENT
Start: 2023-01-09

## 2023-01-09 RX ORDER — SIMVASTATIN 40 MG
40 TABLET ORAL
Qty: 90 TABLET | Refills: 3 | Status: SHIPPED | OUTPATIENT
Start: 2023-01-09

## 2023-01-09 NOTE — PROGRESS NOTES
Assessment and Plan:     Problem List Items Addressed This Visit    None  Visit Diagnoses     Encounter for screening mammogram for breast cancer    -  Primary           Preventive health issues were discussed with patient, and age appropriate screening tests were ordered as noted in patient's After Visit Summary  Personalized health advice and appropriate referrals for health education or preventive services given if needed, as noted in patient's After Visit Summary  History of Present Illness:     Patient presents for a Medicare Wellness Visit    HPI   Patient Care Team:  Marizol Roe MD as PCP - General (Family Medicine)     Review of Systems:     Review of Systems     Problem List:     Patient Active Problem List   Diagnosis   • Mixed hyperlipidemia   • Gastroesophageal reflux disease   • Generalized convulsive seizure ([facility] )   • Pre-diabetes   • Personal history of breast cancer   • Mood disorder ([facility] )   • Primary hypertension   • Malignant neoplasm of right breast in female, estrogen receptor positive, unspecified site of breast ([facility] )   • Chronic bursitis of right shoulder   • Arthritis of carpometacarpal (CMC) joint of left thumb   • Osteopenia      Past Medical and Surgical History:     Past Medical History:   Diagnosis Date   • Breast cancer ([facility] )      Past Surgical History:   Procedure Laterality Date   • HYSTERECTOMY     • MASTECTOMY        Family History:     Family History   Problem Relation Age of Onset   • Cancer Mother    • Heart disease Mother    • Diabetes Mother    • Hypertension Mother    • Heart disease Father    • Diabetes Father    • Hypertension Father    • Cancer Sister    • Diabetes Sister    • Cancer Brother    • Diabetes Brother    • Asthma Son    • Hypertension Son    • Kidney disease Daughter       Social History:     Social History     Socioeconomic History   • Marital status:       Spouse name: None   • Number of children: None   • Years of education: None   • Highest education level: None   Occupational History   • None   Tobacco Use   • Smoking status: Former     Years: 5 00     Types: Cigarettes   • Smokeless tobacco: Never   Vaping Use   • Vaping Use: Never used   Substance and Sexual Activity   • Alcohol use: Not Currently   • Drug use: Never   • Sexual activity: Not Currently   Other Topics Concern   • None   Social History Narrative   • None     Social Determinants of Health     Financial Resource Strain: Low Risk    • Difficulty of Paying Living Expenses: Not hard at all   Food Insecurity: No Food Insecurity   • Worried About [address] in the Last Year: Never true   • Ran Out of Food in the Last Year: Never true   Transportation Needs: No Transportation Needs   • Lack of Transportation (Medical): No   • Lack of Transportation (Non-Medical): No   Physical Activity: Insufficiently Active   • Days of Exercise per Week: 3 days   • Minutes of Exercise per Session: 20 min   Stress: No Stress Concern Present   • Feeling of Stress : Only a little   Social Connections: Moderately Integrated   • Frequency of Communication with Friends and Family: More than three times a week   • Frequency of Social Gatherings with Friends and Family: Twice a week   • Attends [employer] Services: 1 to 4 times per year   • Active Member of Clubs or Organizations: Yes   • Attends Club or Organization Meetings: 1 to 4 times per year   • Marital Status:     Intimate Partner Violence: Not At Risk   • Fear of Current or Ex-Partner: No   • Emotionally Abused: No   • Physically Abused: No   • Sexually Abused: No   Housing Stability: Low Risk    • Unable to Pay for Housing in the Last Year: No   • Number of Places Lived in the Last Year: 1   • Unstable Housing in the Last Year: No      Medications and Allergies:     Current Outpatient Medications   Medication Sig Dispense Refill   • acetaminophen (TYLENOL) 500 mg tablet Take 500 mg by mouth     • aspirin 81 mg chewable tablet Chew 81 mg     • calcium citrate-vitamin D (Calcium Citrate + D3) 315-200 MG-UNIT per tablet Take 2 tablets by mouth 2 (two) times a day 360 tablet 0   • Cholecalciferol 50 MCG (2000 UT) TABS Take 1 tablet (2,000 Units total) by mouth every morning 90 tablet 3   • cycloSPORINE (Restasis) 0 05 % ophthalmic emulsion      • levETIRAcetam (KEPPRA) 1000 MG tablet 2 (two) times a day       • losartan (COZAAR) 50 mg tablet take 1 tablet by mouth once daily 90 tablet 3   • omeprazole (PriLOSEC) 20 mg delayed release capsule take 1 capsule by mouth once daily 90 capsule 1   • simvastatin (ZOCOR) 40 mg tablet take 1 tablet by mouth at bedtime 30 tablet 5   • venlafaxine (EFFEXOR-XR) 75 mg 24 hr capsule take 1 capsule by mouth once daily 90 capsule 1   • carbamide peroxide (DEBROX) 6 5 % otic solution Administer 5 drops into the left ear 2 (two) times a day for 4 days 15 mL 0   • Diclofenac Sodium (VOLTAREN) 1 % Apply 2 g topically 3 (three) times a day as needed (shoulder/knee/thumb pain) (Patient not taking: Reported on 11/18/2022) 100 g 6     No current facility-administered medications for this visit       Allergies   Allergen Reactions   • Pollen Extract Other (See Comments)      Immunizations:     Immunization History   Administered Date(s) Administered   • COVID-19 PFIZER VACCINE 0 3 ML IM 03/17/2021, 04/07/2021, 10/20/2021   • COVID-19 Pfizer vac (Julien-sucrose, gray cap) 12 yr+ IM 05/18/2022   • Influenza Quadrivalent Preservative Free 3 years and older IM 09/15/2018   • Influenza, high dose seasonal 0 7 mL 11/02/2021   • Influenza, seasonal, injectable 08/12/2014, 09/17/2015, 09/07/2016, 08/18/2017   • Pneumococcal Conjugate 13-Valent 04/27/2016, 06/05/2019   • Pneumococcal Polysaccharide PPV23 11/05/2013, 03/18/2014, 07/13/2020   • Tdap 09/17/2012   • Zoster Vaccine Recombinant 02/18/2020, 07/13/2020   • influenza, trivalent, adjuvanted 09/16/2019      Health Maintenance:         Topic Date Due   • Breast Cancer Screening: Mammogram 02/14/2023   • Colorectal Cancer Screening  01/01/2030 (Originally 8/10/1995)   • Hepatitis C Screening  Completed         Topic Date Due   • Hepatitis B Vaccine (1 of 3 - 3-dose series) Never done   • COVID-19 Vaccine (5 - Booster for Pfizer series) 07/13/2022   • Influenza Vaccine (1) 09/01/2022      Medicare Screening Tests and Risk Assessments:     Rory Aguila is here for her Subsequent Wellness visit  Last Medicare Wellness visit information reviewed, patient interviewed and updates made to the record today  Health Risk Assessment:   Patient rates overall health as good  Patient feels that their physical health rating is same  Patient is very satisfied with their life  Eyesight was rated as slightly worse  Hearing was rated as slightly worse  Patient feels that their emotional and mental health rating is much better  Patients states they are never, rarely angry  Patient states they are often unusually tired/fatigued  Pain experienced in the last 7 days has been some  Patient's pain rating has been 4/10  Patient states that she has experienced weight loss or gain in last 6 months  Depression Screening:   PHQ-2 Score: 0  PHQ-9 Score: 7      Fall Risk Screening: In the past year, patient has experienced: history of falling in past year    Number of falls: 2 or more  Injured during fall?: Yes    Feels unsteady when standing or walking?: Yes    Worried about falling?: Yes      Urinary Incontinence Screening:   Patient has leaked urine accidently in the last six months  Home Safety:  Patient has trouble with stairs inside or outside of their home  Patient has working smoke alarms and has no working carbon monoxide detector  Home safety hazards include: none  Nutrition:   Current diet is Frequent junk food  Medications:   Patient is currently taking over-the-counter supplements  OTC medications include: see medication list  Patient is able to manage medications       Activities of Daily Living (ADLs)/Instrumental Activities of Daily Living (IADLs):   Walk and transfer into and out of bed and chair?: Yes  Dress and groom yourself?: Yes    Bathe or shower yourself?: Yes    Feed yourself? Yes  Do your laundry/housekeeping?: Yes  Manage your money, pay your bills and track your expenses?: Yes  Make your own meals?: Yes    Do your own shopping?: Yes    Previous Hospitalizations:   Any hospitalizations or ED visits within the last 12 months?: No      Advance Care Planning:   Living will: Yes    Durable POA for healthcare: Yes    Advanced directive: Yes      Cognitive Screening:   Provider or family/friend/caregiver concerned regarding cognition?: No    PREVENTIVE SCREENINGS      Cardiovascular Screening:    General: Screening Not Indicated and History Lipid Disorder      Diabetes Screening:     General: Screening Not Indicated and History Diabetes      Colorectal Cancer Screening:     General: Screening Current      Breast Cancer Screening:     General: History Breast Cancer      Cervical Cancer Screening:    General: Screening Not Indicated      Osteoporosis Screening:    General: Screening Current      Abdominal Aortic Aneurysm (AAA) Screening:        General: Screening Not Indicated      Lung Cancer Screening:     General: Screening Not Indicated      Hepatitis C Screening:    General: Screening Current    Screening, Brief Intervention, and Referral to Treatment (SBIRT)    Screening  Typical number of drinks in a day: 1  Typical number of drinks in a week: 5  Interpretation: Low risk drinking behavior  AUDIT-C Screenin) How often did you have a drink containing alcohol in the past year? 4 or more times a week  2) How many drinks did you have on a typical day when you were drinking in the past year?  1 to 2  3) How often did you have 6 or more drinks on one occasion in the past year? never    AUDIT-C Score: 4  Interpretation: Score 3-12 (female): POSITIVE screen for alcohol misuse    AUDIT Screenin) How often during the last year have you found that you were not able to stop drinking once you had started? 0 - never  5) How often during the last year have you failed to do what was normally expected from you because of drinking? 0 - never  6) How often during the last year have you needed a first drink in the morning to get yourself going after a heavy drinking session? 0 - never  7) How often during the last year have you had a feeling of guilt or remorse after drinking? 0 - never  8) How often during the last year have you been unable to remember what happened the night before because you had been drinking? 0 - never  9) Have you or someone else been injured as a result of your drinking? 0 - no  10) Has a relative or friend or a doctor or another health worker been concerned about your drinking or suggested you cut down? 0 - no    AUDIT Score: 4  Interpretation: Low risk alcohol consumption    Single Item Drug Screening:  How often have you used an illegal drug (including marijuana) or a prescription medication for non-medical reasons in the past year? never    Single Item Drug Screen Score: 0  Interpretation: Negative screen for possible drug use disorder    Brief Intervention  Alcohol & drug use screenings were reviewed  No concerns regarding substance use disorder identified  No results found       Physical Exam:     Pulse 68   Temp (!) 96 2 °F (35 7 °C) (Temporal)   Resp 18   Ht 5' 6" (1 676 m)   Wt 92 1 kg (203 lb)   SpO2 97%   BMI 32 77 kg/m²     Physical Exam     Hay ePña MD

## 2023-01-09 NOTE — PATIENT INSTRUCTIONS
Medicare Preventive Visit Patient Instructions  Thank you for completing your Welcome to Medicare Visit or Medicare Annual Wellness Visit today  Your next wellness visit will be due in one year (1/10/2024)  The screening/preventive services that you may require over the next 5-10 years are detailed below  Some tests may not apply to you based off risk factors and/or age  Screening tests ordered at today's visit but not completed yet may show as past due  Also, please note that scanned in results may not display below  Preventive Screenings:  Service Recommendations Previous Testing/Comments   Colorectal Cancer Screening  * Colonoscopy    * Fecal Occult Blood Test (FOBT)/Fecal Immunochemical Test (FIT)  * Fecal DNA/Cologuard Test  * Flexible Sigmoidoscopy Age: 39-70 years old   Colonoscopy: every 10 years (may be performed more frequently if at higher risk)  OR  FOBT/FIT: every 1 year  OR  Cologuard: every 3 years  OR  Sigmoidoscopy: every 5 years  Screening may be recommended earlier than age 39 if at higher risk for colorectal cancer  Also, an individualized decision between you and your healthcare provider will decide whether screening between the ages of 74-80 would be appropriate  Colonoscopy: 09/02/2020  FOBT/FIT: Not on file  Cologuard: Not on file  Sigmoidoscopy: Not on file    Screening Current     Breast Cancer Screening Age: 36 years old  Frequency: every 1-2 years  Not required if history of left and right mastectomy Mammogram: 02/14/2022    History Breast Cancer   Cervical Cancer Screening Between the ages of 21-29, pap smear recommended once every 3 years  Between the ages of 33-67, can perform pap smear with HPV co-testing every 5 years     Recommendations may differ for women with a history of total hysterectomy, cervical cancer, or abnormal pap smears in past  Pap Smear: Not on file    Screening Not Indicated   Hepatitis C Screening Once for adults born between 1945 and 1965  More frequently in patients at high risk for Hepatitis C Hep C Antibody: 05/09/2022    Screening Current   Diabetes Screening 1-2 times per year if you're at risk for diabetes or have pre-diabetes Fasting glucose: 132 mg/dL (5/9/2022)  A1C: 6 1 % (5/9/2022)  Screening Not Indicated  History Diabetes   Cholesterol Screening Once every 5 years if you don't have a lipid disorder  May order more often based on risk factors  Lipid panel: 05/09/2022    Screening Not Indicated  History Lipid Disorder     Other Preventive Screenings Covered by Medicare:  1  Abdominal Aortic Aneurysm (AAA) Screening: covered once if your at risk  You're considered to be at risk if you have a family history of AAA  2  Lung Cancer Screening: covers low dose CT scan once per year if you meet all of the following conditions: (1) Age 50-69; (2) No signs or symptoms of lung cancer; (3) Current smoker or have quit smoking within the last 15 years; (4) You have a tobacco smoking history of at least 20 pack years (packs per day multiplied by number of years you smoked); (5) You get a written order from a healthcare provider  3  Glaucoma Screening: covered annually if you're considered high risk: (1) You have diabetes OR (2) Family history of glaucoma OR (3)  aged 48 and older OR (3)  American aged 72 and older  3  Osteoporosis Screening: covered every 2 years if you meet one of the following conditions: (1) You're estrogen deficient and at risk for osteoporosis based off medical history and other findings; (2) Have a vertebral abnormality; (3) On glucocorticoid therapy for more than 3 months; (4) Have primary hyperparathyroidism; (5) On osteoporosis medications and need to assess response to drug therapy  · Last bone density test (DXA Scan): 01/06/2022   5  HIV Screening: covered annually if you're between the age of 15-65  Also covered annually if you are younger than 13 and older than 72 with risk factors for HIV infection   For pregnant patients, it is covered up to 3 times per pregnancy  Immunizations:  Immunization Recommendations   Influenza Vaccine Annual influenza vaccination during flu season is recommended for all persons aged >= 6 months who do not have contraindications   Pneumococcal Vaccine   * Pneumococcal conjugate vaccine = PCV13 (Prevnar 13), PCV15 (Vaxneuvance), PCV20 (Prevnar 20)  * Pneumococcal polysaccharide vaccine = PPSV23 (Pneumovax) Adults 25-60 years old: 1-3 doses may be recommended based on certain risk factors  Adults 72 years old: 1-2 doses may be recommended based off what pneumonia vaccine you previously received   Hepatitis B Vaccine 3 dose series if at intermediate or high risk (ex: diabetes, end stage renal disease, liver disease)   Tetanus (Td) Vaccine - COST NOT COVERED BY MEDICARE PART B Following completion of primary series, a booster dose should be given every 10 years to maintain immunity against tetanus  Td may also be given as tetanus wound prophylaxis  Tdap Vaccine - COST NOT COVERED BY MEDICARE PART B Recommended at least once for all adults  For pregnant patients, recommended with each pregnancy  Shingles Vaccine (Shingrix) - COST NOT COVERED BY MEDICARE PART B  2 shot series recommended in those aged 48 and above     Health Maintenance Due:      Topic Date Due   • Breast Cancer Screening: Mammogram  02/14/2023   • Colorectal Cancer Screening  01/01/2030 (Originally 8/10/1993)   • Hepatitis C Screening  Completed     Immunizations Due:      Topic Date Due   • Hepatitis B Vaccine (1 of 3 - 3-dose series) Never done   • COVID-19 Vaccine (5 - Booster for Pfizer series) 07/13/2022   • Influenza Vaccine (1) 09/01/2022     Advance Directives   What are advance directives? Advance directives are legal documents that state your wishes and plans for medical care  These plans are made ahead of time in case you lose your ability to make decisions for yourself   Advance directives can apply to any medical decision, such as the treatments you want, and if you want to donate organs  What are the types of advance directives? There are many types of advance directives, and each state has rules about how to use them  You may choose a combination of any of the following:  · Living will: This is a written record of the treatment you want  You can also choose which treatments you do not want, which to limit, and which to stop at a certain time  This includes surgery, medicine, IV fluid, and tube feedings  · Durable power of  for healthcare [facility]): This is a written record that states who you want to make healthcare choices for you when you are unable to make them for yourself  This person, called a proxy, is usually a family member or a friend  You may choose more than 1 proxy  · Do not resuscitate (DNR) order:  A DNR order is used in case your heart stops beating or you stop breathing  It is a request not to have certain forms of treatment, such as CPR  A DNR order may be included in other types of advance directives  · Medical directive: This covers the care that you want if you are in a coma, near death, or unable to make decisions for yourself  You can list the treatments you want for each condition  Treatment may include pain medicine, surgery, blood transfusions, dialysis, IV or tube feedings, and a ventilator (breathing machine)  · Values history: This document has questions about your views, beliefs, and how you feel and think about life  This information can help others choose the care that you would choose  Why are advance directives important? An advance directive helps you control your care  Although spoken wishes may be used, it is better to have your wishes written down  Spoken wishes can be misunderstood, or not followed  Treatments may be given even if you do not want them  An advance directive may make it easier for your family to make difficult choices about your care     Fall Prevention    Fall prevention  includes ways to make your home and other areas safer  It also includes ways you can move more carefully to prevent a fall  Health conditions that cause changes in your blood pressure, vision, or muscle strength and coordination may increase your risk for falls  Medicines may also increase your risk for falls if they make you dizzy, weak, or sleepy  Fall prevention tips:   · Stand or sit up slowly  · Use assistive devices as directed  · Wear shoes that fit well and have soles that   · Wear a personal alarm  · Stay active  · Manage your medical conditions  Home Safety Tips:  · Add items to prevent falls in the bathroom  · Keep paths clear  · Install bright lights in your home  · Keep items you use often on shelves within reach  · Paint or place reflective tape on the edges of your stairs  Urinary Incontinence   Urinary incontinence (UI)  is when you lose control of your bladder  UI develops because your bladder cannot store or empty urine properly  The 3 most common types of UI are stress incontinence, urge incontinence, or both  Medicines:   · May be given to help strengthen your bladder control  Report any side effects of medication to your healthcare provider  Do pelvic muscle exercises often:  Your pelvic muscles help you stop urinating  Squeeze these muscles tight for 5 seconds, then relax for 5 seconds  Gradually work up to squeezing for 10 seconds  Do 3 sets of 15 repetitions a day, or as directed  This will help strengthen your pelvic muscles and improve bladder control  Train your bladder:  Go to the bathroom at set times, such as every 2 hours, even if you do not feel the urge to go  You can also try to hold your urine when you feel the urge to go  For example, hold your urine for 5 minutes when you feel the urge to go  As that becomes easier, hold your urine for 10 minutes  Self-care:   · Keep a UI record    Write down how often you leak urine and how much you leak  Make a note of what you were doing when you leaked urine  · Drink liquids as directed  You may need to limit the amount of liquid you drink to help control your urine leakage  Do not drink any liquid right before you go to bed  Limit or do not have drinks that contain caffeine or alcohol  · Prevent constipation  Eat a variety of high-fiber foods  Good examples are high-fiber cereals, beans, vegetables, and whole-grain breads  Walking is the best way to trigger your intestines to have a bowel movement  · Exercise regularly and maintain a healthy weight  Weight loss and exercise will decrease pressure on your bladder and help you control your leakage  · Use a catheter as directed  to help empty your bladder  A catheter is a tiny, plastic tube that is put into your bladder to drain your urine  · Go to behavior therapy as directed  Behavior therapy may be used to help you learn to control your urge to urinate  Weight Management   Why it is important to manage your weight:  Being overweight increases your risk of health conditions such as heart disease, high blood pressure, type 2 diabetes, and certain types of cancer  It can also increase your risk for osteoarthritis, sleep apnea, and other respiratory problems  Aim for a slow, steady weight loss  Even a small amount of weight loss can lower your risk of health problems  How to lose weight safely:  A safe and healthy way to lose weight is to eat fewer calories and get regular exercise  You can lose up about 1 pound a week by decreasing the number of calories you eat by 500 calories each day  Healthy meal plan for weight management:  A healthy meal plan includes a variety of foods, contains fewer calories, and helps you stay healthy  A healthy meal plan includes the following:  · Eat whole-grain foods more often  A healthy meal plan should contain fiber   Fiber is the part of grains, fruits, and vegetables that is not broken down by your body  Whole-grain foods are healthy and provide extra fiber in your diet  Some examples of whole-grain foods are whole-wheat breads and pastas, oatmeal, brown rice, and bulgur  · Eat a variety of vegetables every day  Include dark, leafy greens such as spinach, kale, jeff greens, and mustard greens  Eat yellow and orange vegetables such as carrots, sweet potatoes, and winter squash  · Eat a variety of fruits every day  Choose fresh or canned fruit (canned in its own juice or light syrup) instead of juice  Fruit juice has very little or no fiber  · Eat low-fat dairy foods  Drink fat-free (skim) milk or 1% milk  Eat fat-free yogurt and low-fat cottage cheese  Try low-fat cheeses such as mozzarella and other reduced-fat cheeses  · Choose meat and other protein foods that are low in fat  Choose beans or other legumes such as split peas or lentils  Choose fish, skinless poultry (chicken or turkey), or lean cuts of red meat (beef or pork)  Before you cook meat or poultry, cut off any visible fat  · Use less fat and oil  Try baking foods instead of frying them  Add less fat, such as margarine, sour cream, regular salad dressing and mayonnaise to foods  Eat fewer high-fat foods  Some examples of high-fat foods include french fries, doughnuts, ice cream, and cakes  · Eat fewer sweets  Limit foods and drinks that are high in sugar  This includes candy, cookies, regular soda, and sweetened drinks  Exercise:  Exercise at least 30 minutes per day on most days of the week  Some examples of exercise include walking, biking, dancing, and swimming  You can also fit in more physical activity by taking the stairs instead of the elevator or parking farther away from stores  Ask your healthcare provider about the best exercise plan for you  Alcohol Use and Your Health    Drinking too much can harm your health    Excessive alcohol use leads to about 88,000 death in the [place] each year, and shortens the life of those who diet by almost 30 years  Further, excessive drinking cost the economy $249 billion in 2010  Most excessive drinkers are not alcohol dependent  Excessive alcohol use has immediate effects that increase the risk of many harmful health conditions  These are most often the result of binge drinking  Over time, excessive alcohol use can lead to the development of chronic diseases and other series health problems  What is considered a "drink"? Excessive alcohol use includes:  · Binge Drinking: For women, 4 or more drinks consumed on one occasion  For men, 5 or more drinks consumed on one occasion  · Heavy Drinking: For women, 8 or more drinks per week  For men, 15 or more drinks per week  · Any alcohol used by pregnant women  · Any alcohol used by those under the age of 21 years    If you choose to drink, do so in moderation:  · Do not drink at all if you are under the age of 24, or if you are or may be pregnant, or have health problems that could be made worse by drinking    · For women, up to 1 drink per day  · For men, up to 2 drinks a day    No one should begin drinking or drink more frequently based on potential health benefits    Short-Term Health Risks:  · Injuries: motor vehicle crashes, falls, drownings, burns  · Violence: homicide, suicide, sexual assault, intimate partner violence  · Alcohol poisoning  · Reproductive health: risky sexual behaviors, unintended prengnacy, sexually transmitted diseases, miscarriage, stillbirth, fetal alcohol syndrome    Long-Term Health Risks:  · Chronic diseases: high blood pressure, heart disease, stroke, liver disease, digestive problems  · Cancers: breast, mouth and throat, liver, colon  · Learning and memory problems: dementia, poor school performance  · Mental health: depression, anxiety, insomnia  · Social problems: lost productivity, family problems, unemployment  · Alcohol dependence    For support and more information:  · Substance Abuse and [address] , [address] [place]  Web Address: https://Sape/    · Alcoholics Anonymous        Web Address: http://www zuñiga info/    https://www cdc gov/alcohol/fact-sheets/alcohol-use htm     © [address] 2018 Information is for End User's use only and may not be sold, redistributed or otherwise used for commercial purposes   All illustrations and images included in CareNotes® are the copyrighted property of A D A M , Inc  or [address] [place] [address]

## 2023-01-09 NOTE — ASSESSMENT & PLAN NOTE
We discussed this diagnosis today  She is not interested in pursuing treatment at this time  She is aware of the Kegel exercises and performs them from time to time

## 2023-01-10 NOTE — PROGRESS NOTES
Name: Sindy Serrano      :       MRN: [id number]  Encounter Provider: Yassine Connor MD  Encounter Date: 2023   Encounter department: [address] Medicare annual wellness visit, subsequent  -     Lipid panel; Future  -     Comprehensive metabolic panel; Future; Expected date: 01/10/2023  -     TSH, 3rd generation; Future  -     HEMOGLOBIN A1C W/ EAG ESTIMATION; Future    2  Encounter for screening mammogram for breast cancer  -     Mammo screening bilateral w 3d & cad; Future; Expected date: 2023  -     Lipid panel; Future  -     Comprehensive metabolic panel; Future; Expected date: 01/10/2023  -     TSH, 3rd generation; Future  -     HEMOGLOBIN A1C W/ EAG ESTIMATION; Future    3  Mood disorder ([facility] [address] )  Assessment & Plan:  Good symptom control on Effexor  Continue current  Monitor BP  Orders:  -     venlafaxine (EFFEXOR-XR) 75 mg 24 hr capsule; Take 1 capsule (75 mg total) by mouth daily  -     Lipid panel; Future  -     Comprehensive metabolic panel; Future; Expected date: 01/10/2023  -     TSH, 3rd generation; Future  -     HEMOGLOBIN A1C W/ EAG ESTIMATION; Future    4  Type 2 diabetes mellitus without complication, without long-term current use of insulin (HCC)  -     simvastatin (ZOCOR) 40 mg tablet; Take 1 tablet (40 mg total) by mouth daily at bedtime  -     Lipid panel; Future  -     Comprehensive metabolic panel; Future; Expected date: 01/10/2023  -     TSH,  generation; Future  -     HEMOGLOBIN A1C W/ EAG ESTIMATION; Future    5  Chronic bursitis of right shoulder  -     Diclofenac Sodium (VOLTAREN) 1 %; Apply 2 g topically 3 (three) times a day as needed (shoulder/knee/thumb pain)  -     Lipid panel; Future  -     Comprehensive metabolic panel; Future; Expected date: 01/10/2023  -     TSH, 3rd generation; Future  -     HEMOGLOBIN A1C W/ EAG ESTIMATION; Future    6   Arthritis of carpometacarpal (CMC) joint of left thumb  - Diclofenac Sodium (VOLTAREN) 1 %; Apply 2 g topically 3 (three) times a day as needed (shoulder/knee/thumb pain)  -     Lipid panel; Future  -     Comprehensive metabolic panel; Future; Expected date: 01/10/2023  -     TSH, 3rd generation; Future  -     HEMOGLOBIN A1C W/ EAG ESTIMATION; Future    7  Seizure disorder (HCC)  -     levETIRAcetam (KEPPRA) 1000 MG tablet; Take 1 tablet (1,000 mg total) by mouth 2 (two) times a day  -     Lipid panel; Future  -     Comprehensive metabolic panel; Future; Expected date: 01/10/2023  -     TSH, 3rd generation; Future  -     HEMOGLOBIN A1C W/ EAG ESTIMATION; Future    8  Primary hypertension  -     losartan-hydrochlorothiazide (HYZAAR) 100-25 MG per tablet; Take 1 tablet by mouth daily  -     Lipid panel; Future  -     Comprehensive metabolic panel; Future; Expected date: 01/10/2023  -     TSH, 3rd generation; Future  -     HEMOGLOBIN A1C W/ EAG ESTIMATION; Future    9  Malignant neoplasm of right breast in female, estrogen receptor positive, unspecified site of breast [facility])  Assessment & Plan:  Completed treatment  She is a 10 year survivor  Orders:  -     Lipid panel; Future  -     Comprehensive metabolic panel; Future; Expected date: 01/10/2023  -     TSH, 3rd generation; Future  -     HEMOGLOBIN A1C W/ EAG ESTIMATION; Future    10  Gastroesophageal reflux disease, unspecified whether esophagitis present  Assessment & Plan:  Symptoms well controlled  Continue current  Orders:  -     Lipid panel; Future  -     Comprehensive metabolic panel; Future; Expected date: 01/10/2023  -     TSH, 3rd generation; Future  -     HEMOGLOBIN A1C W/ EAG ESTIMATION; Future    11  Mixed hyperlipidemia  Assessment & Plan:  Lipids at goal   Continue current  Orders:  -     Lipid panel; Future  -     Comprehensive metabolic panel; Future; Expected date: 01/10/2023  -     TSH, 3rd generation; Future  -     HEMOGLOBIN A1C W/ EAG ESTIMATION; Future    12   Generalized convulsive seizure [facility])  Assessment & Plan:  Last seizure 6-7 years ago  Continue current  Orders:  -     Lipid panel; Future  -     Comprehensive metabolic panel; Future; Expected date: 01/10/2023  -     TSH, 3rd generation; Future  -     HEMOGLOBIN A1C W/ EAG ESTIMATION; Future    13  Pre-diabetes  Assessment & Plan:  A1c improved  Continue current  Orders:  -     Lipid panel; Future  -     Comprehensive metabolic panel; Future; Expected date: 01/10/2023  -     TSH, 3rd generation; Future  -     HEMOGLOBIN A1C W/ EAG ESTIMATION; Future         Subjective      She is doing well overall  Her BP is better controlled at home  She has no CP or SOB  She has no HA or vision changes  She has no PND or orthopnea  Her lipids are at goal   She has normal liver function testing  She has no myalgia or muscle weakness  She has been seizure free for many years  She has no side effects from her current regimen  She has depression  Her symptoms are well controlled on her current regimen  Review of Systems   All other systems reviewed and are negative        Current Outpatient Medications on File Prior to Visit   Medication Sig   • acetaminophen (TYLENOL) 500 mg tablet Take 500 mg by mouth   • aspirin 81 mg chewable tablet Chew 81 mg   • calcium citrate-vitamin D (Calcium Citrate + D3) 315-200 MG-UNIT per tablet Take 2 tablets by mouth 2 (two) times a day   • Cholecalciferol 50 MCG (2000 UT) TABS Take 1 tablet (2,000 Units total) by mouth every morning   • cycloSPORINE (Restasis) 0 05 % ophthalmic emulsion    • omeprazole (PriLOSEC) 20 mg delayed release capsule take 1 capsule by mouth once daily   • carbamide peroxide (DEBROX) 6 5 % otic solution Administer 5 drops into the left ear 2 (two) times a day for 4 days       Objective     BP (!) 177/83 (BP Location: Left arm, Patient Position: Sitting, Cuff Size: Large)   Pulse 68   Temp (!) 96 2 °F (35 7 °C) (Temporal)   Resp 18   Ht 5' 6" (1 676 m)   Wt 92 1 kg (203 lb)   SpO2 97%   BMI 32 77 kg/m²     Physical Exam  Vitals and nursing note reviewed  Constitutional:       Appearance: Normal appearance  Cardiovascular:      Rate and Rhythm: Normal rate and regular rhythm  Pulses: Normal pulses  Heart sounds: Normal heart sounds  Pulmonary:      Effort: Pulmonary effort is normal       Breath sounds: Normal breath sounds  Abdominal:      General: Abdomen is flat  Palpations: Abdomen is soft  Musculoskeletal:         General: Normal range of motion  Cervical back: Normal range of motion and neck supple  Skin:     General: Skin is warm and dry  Capillary Refill: Capillary refill takes less than 2 seconds  Neurological:      General: No focal deficit present  Mental Status: She is alert and oriented to person, place, and time  Mental status is at baseline  Psychiatric:         Mood and Affect: Mood normal          Behavior: Behavior normal          Thought Content:  Thought content normal          Judgment: Judgment normal        Sheree Dorsey MD

## 2023-02-15 ENCOUNTER — DOCTOR'S OFFICE (OUTPATIENT)
Dept: URBAN - NONMETROPOLITAN AREA CLINIC 1 | Facility: CLINIC | Age: 73
Setting detail: OPHTHALMOLOGY
End: 2023-02-15
Payer: MEDICARE

## 2023-02-15 DIAGNOSIS — H43.813: ICD-10-CM

## 2023-02-15 DIAGNOSIS — H04.122: ICD-10-CM

## 2023-02-15 DIAGNOSIS — H35.363: ICD-10-CM

## 2023-02-15 DIAGNOSIS — Z96.1: ICD-10-CM

## 2023-02-15 DIAGNOSIS — H04.123: ICD-10-CM

## 2023-02-15 DIAGNOSIS — H04.121: ICD-10-CM

## 2023-02-15 PROCEDURE — 99214 OFFICE O/P EST MOD 30 MIN: CPT | Performed by: OPHTHALMOLOGY

## 2023-02-15 PROCEDURE — 92134 CPTRZ OPH DX IMG PST SGM RTA: CPT | Performed by: OPHTHALMOLOGY

## 2023-02-15 PROCEDURE — 83861 MICROFLUID ANALY TEARS: CPT | Performed by: OPHTHALMOLOGY

## 2023-02-15 ASSESSMENT — REFRACTION_MANIFEST
OS_SPHERE: +0.50
OS_ADD: +2.50
OD_SPHERE: +0.25
OD_AXIS: 180
OD_VA2: 20/25
OD_VA1: 20/20
OS_VA2: 20/25
OD_CYLINDER: -1.25
OS_CYLINDER: -1.25
OD_ADD: +2.50
OS_VA1: 20/20
OS_AXIS: 025
OU_VA: 20/20

## 2023-02-15 ASSESSMENT — CONFRONTATIONAL VISUAL FIELD TEST (CVF)
OD_FINDINGS: FULL
OS_FINDINGS: FULL

## 2023-02-15 ASSESSMENT — REFRACTION_CURRENTRX
OS_SPHERE: +0.25
OD_OVR_VA: 20/
OS_CYLINDER: -1.25
OD_ADD: +2.75
OS_OVR_VA: 20/
OS_ADD: +2.75
OD_AXIS: 178
OS_AXIS: 022
OD_VPRISM_DIRECTION: TRF
OD_CYLINDER: -1.25
OD_SPHERE: +0.25
OS_VPRISM_DIRECTION: TRF

## 2023-02-15 ASSESSMENT — REFRACTION_AUTOREFRACTION
OD_AXIS: 001
OS_AXIS: 013
OD_CYLINDER: -2.50
OS_CYLINDER: -1.00
OS_SPHERE: +0.25
OD_SPHERE: +0.25

## 2023-02-15 ASSESSMENT — VISUAL ACUITY
OS_BCVA: 20/25+1
OD_BCVA: 20/25+1

## 2023-02-15 ASSESSMENT — KERATOMETRY
OS_K1POWER_DIOPTERS: 47.75
OD_K1POWER_DIOPTERS: 48.00
OD_AXISANGLE_DEGREES: 180
OS_AXISANGLE_DEGREES: 007
OD_K2POWER_DIOPTERS: 48.25
OS_K2POWER_DIOPTERS: 47.75

## 2023-02-15 ASSESSMENT — PUNCTA - ASSESSMENT
OS_PUNCTA: SIL PLUG LLL SMALL
OD_PUNCTA: SIL PLUG RLL SMALL

## 2023-02-15 ASSESSMENT — LID EXAM ASSESSMENTS
OS_BLEPHARITIS: ABSENT
OD_BLEPHARITIS: ABSENT

## 2023-02-15 ASSESSMENT — SPHEQUIV_DERIVED
OD_SPHEQUIV: -1
OS_SPHEQUIV: -0.125
OS_SPHEQUIV: -0.25
OD_SPHEQUIV: -0.375

## 2023-02-15 ASSESSMENT — AXIALLENGTH_DERIVED
OS_AL: 22.2137
OD_AL: 22.1351
OD_AL: 22.3515
OS_AL: 22.1707

## 2023-03-31 DIAGNOSIS — E11.9 TYPE 2 DIABETES MELLITUS WITHOUT COMPLICATION, WITHOUT LONG-TERM CURRENT USE OF INSULIN (HCC): ICD-10-CM

## 2023-03-31 RX ORDER — SIMVASTATIN 40 MG
40 TABLET ORAL
Qty: 100 TABLET | Refills: 3 | Status: SHIPPED | OUTPATIENT
Start: 2023-03-31

## 2023-05-19 ENCOUNTER — TELEPHONE (OUTPATIENT)
Dept: ADMINISTRATIVE | Facility: OTHER | Age: 73
End: 2023-05-19

## 2023-05-19 NOTE — TELEPHONE ENCOUNTER
Upon review of the In Basket request we [facility] is unable/can't update work flows done at an internal/office level  Any additional questions or concerns should be emailed to the Practice Liaisons via the appropriate education email address, please do not reply via In Basket      Thank you  Neva Castañeda MA

## 2023-05-19 NOTE — TELEPHONE ENCOUNTER
----- Message from Marvin Morin sent at 5/18/2023  3:09 PM EDT -----  Regarding: Care Gap Request  05/18/23 3:09 PM    Hello, our patient attached above has had Medicare AWV completed/performed  Please assist in updating the patient chart by pulling the document from Encounter Tab within Chart Review  The date of service is 1/9/2023       Thank you,  Marvin LYNCH [facility]    Medicine Progress Note - Hospitalist Service       Date of Admission:  2/11/2022  Assessment & Plan   Westley Ness is a 73-year-old male with history of coronary artery disease, ischemic cardiomyopathy with EF of 20-25%, end-stage renal disease on HD M-W-F, diabetes mellitus type 2, GERD, atrial flutter/fibrillation on chronic anticoagulation with apixaban, status post ICD placement, and hypertension who presents with dyspnea on exertion with home pulse oxymetry of 60%, found to be COVID19 positive. Admitted 2/11/2022.      COVID-19 infection    Acute Hypoxic Respiratory Failure secondary to COVID-19 infection  Parapneumonic effusion  Sepsis due to COVID-19     Symptom Onset Unknown, patient has exposure from home health aid but was not able to link symptom onset to Covid because of his chronic symptoms so would do 20 days from positive test for isolation   Date of 1st Positive Test 2/11/2022   Vaccination Status Fully Vaccinated         - COVID-19 special precautions, continuous pulse-ox  - Oxygen: stable on room air at rest, needs assessment with activity; titrate to keep SpO2 between 90-96%  - Labs: Monitor COVID labs. CRP consistently trending down.   - Imaging: no additional imaging needed at this time  - Breathing treatments: no inhalers needed; avoid nebulizers in favor of MDIs   - IV fluids: not indicated at this time  - Antibiotics: not indicated   - COVID-Focused Medications: Dexamethasone 6 mg x 10 days or until hospital discharge, started on 2/11; Not a candidate for Remdesivir because of ESRD, monitor CRP levels and oxygen levels to see if he would qualify for other treatments if worsens   - DVT Prophylaxis:         - At high risk of thrombotic complications due to COVID-19 (DDimer = 0.71 ug/mL FEU (Ref range: 0.00 - 0.50 ug/mL FEU) )         - Continue therapeutic anticoagulation with apixaban      ESRD  Hypocalcemia  Gets dialysis M/W/F and nephrologist is  "Dr. Hammond.  - Nephrology consulted, HD per nephrology   - Next HD planned 2/14     Atrial fibrillation/flutter with rapid ventricular response.   Patient has intermittent atrial fibrillation with rapid ventricular response with rates likely exacerbated by COVID19 infection and sepsis.   - Continue home metoprolol  - Metoprolol 2.5 mg IV q4h PRN HR > 120  - Continue prior to admission apixaban      Coronary artery disease  Ischemic cardiomyopathy  The patient has coronary artery disease and a cardiomyopathy with EF 20-25%.  His last angiogram was in 10/2020, which shows subtotal occlusion of left anterior descending (LAD) with known infarct area, patent proximal left circumflex, severe stenosis of the large OM1 distal to the stent, which was stented with FOREST at that time. Maintained on clopidogrel and apixaban  - Continue home metoprolol, lisinopril, mexiletine, Imdur and pravastatin  - Note patient will need an updated Imdur prescription (see H&P) on discharge      Hypothyroidism  - Continue prior to admission levothyroxine      Physical deconditioning  Multifactorial with age, chronic comorbidities, atrial fibrillation with rapid ventricular response, CHF, end-stage renal disease and leg pain.    - PT/OT/SW consulted  - PT recommending home with home PT     Chronic left hip and left knee pain from osteoarthritis  - Continue scheduled acetaminophen, gabapentin Tylenol and gabapentin  - Hold home prednisone 5 mg daily and plan to restart once he completes his dexamethasone     Chronic diarrhea  - Continue prior to admission loperamide       Clinically Significant Risk Factors Present on Admission             # Overweight: Estimated body mass index is 25.54 kg/m  as calculated from the following:    Height as of this encounter: 1.727 m (5' 8\").    Weight as of this encounter: 76.2 kg (167 lb 15.9 oz).         Diet: Moderate Consistent Carb (60 g CHO per Meal) Diet    DVT Prophylaxis: Apixaban   Velarde Catheter: Not " present  Code Status: Full Code      Disposition Plan   Expected Discharge: 02/14/2022 to home after HD  Anticipated discharge location: home    Delays:        Entered: Bryant Ozuna MD 02/13/2022, 11:31 AM       The patient's care was discussed with the patient    Bryant Ozuna MD  Hospitalist Service  [facility]    ______________________________________________________________________    Interval History   No acute events overnight. Continues to have some soreness in his chest on the right side, worse with coughing.  Overall this is improving.  Continues to have an intermittent cough.  Denies any shortness of breath at rest.  Denies any abdominal pain, nausea, vomiting.    Data reviewed today: I reviewed all medications, new labs and imaging results over the last 24 hours. I personally reviewed no images or EKG's today.    Physical Exam   Vital Signs: Temp: 97.2  F (36.2  C) Temp src: Axillary BP: (!) 87/51 Pulse: 93   Resp: 18 SpO2: 98 % O2 Device: Nasal cannula Oxygen Delivery: 1 LPM  Weight: 167 lbs 15.85 oz    Constitutional:  NAD  Respiratory: Normal respiratory effort at rest, non-labored breathing   Cardiovascular: RRR   GI: Soft, non-tender, non-distended   Skin/Integumen: Warm, dry  Other:      Data   Recent Labs   Lab 02/13/22  1057 02/13/22  0849 02/13/22  0846 02/12/22  0741 02/12/22  0737 02/11/22  1657 02/11/22  1647 02/11/22  1439 02/11/22  0653   WBC  --  8.9  --   --  7.1  --  7.5  --  7.6   HGB  --  12.6*  --   --  12.7*  --  12.5*  --  12.8*   MCV  --  113*  --   --  108*  --  111*  --  111*   PLT  --  132*  --   --  111*  --  112*  --  150   INR  --   --   --   --   --   --  1.23*  --   --    NA  --  134  --   --  136  --  136  --  136   POTASSIUM  --  4.4  --   --  4.0  --  4.4  --  4.1   CHLORIDE  --  102  --   --  101  --  102  --  100   CO2  --  24  --   --  29  --  27  --  28   BUN  --  26  --   --  14  --  25  --  23   CR  --  4.00*  --   --  2.82*  --   4.73*  --  4.34*   ANIONGAP  --  8  --   --  6  --  7  --  8   CHRISTINE  --  8.2*  --   --  8.0*  --  8.0*  --  8.4*   GLC 91 97 81   < > 83   < > 180*   < > 107*   ALBUMIN  --   --   --   --   --   --  3.1*  --  3.4   PROTTOTAL  --   --   --   --   --   --  6.0*  --  6.4*   BILITOTAL  --   --   --   --   --   --  1.5*  --  1.7*   ALKPHOS  --   --   --   --   --   --  209*  --  214*   ALT  --   --   --   --   --   --  22  --  24   AST  --   --   --   --   --   --  16  --  19    < > = values in this interval not displayed.       No results found for this or any previous visit (from the past 24 hour(s)).  Medications     - MEDICATION INSTRUCTIONS -         - MEDICATION INSTRUCTIONS for Dialysis Patients -   Does not apply See Admin Instructions     acetaminophen  1,000 mg Oral TID     apixaban ANTICOAGULANT  2.5 mg Oral BID     clopidogrel  75 mg Oral Daily     dexamethasone  6 mg Oral Daily     famotidine  20 mg Oral Daily     gabapentin  100 mg Oral TID     insulin aspart  1-7 Units Subcutaneous TID AC     insulin aspart  1-5 Units Subcutaneous At Bedtime     isosorbide mononitrate  15 mg Oral Once per day on Sun Tue Thu     isosorbide mononitrate  30 mg Oral Once per day on Mon Wed Fri Sat     levothyroxine  50 mcg Oral QAM AC     lisinopril  2.5 mg Oral BID     loperamide  2 mg Oral Once per day on Mon Wed Fri     loperamide  1 mg Oral Once per day on Sun Tue Thu Sat     metoprolol succinate ER  12.5 mg Oral Daily     mexiletine  150 mg Oral BID     pantoprazole  40 mg Oral QAM AC     pravastatin  40 mg Oral At Bedtime     sodium chloride (PF)  3 mL Intracatheter Q8H

## 2023-07-05 DIAGNOSIS — K21.9 GASTROESOPHAGEAL REFLUX DISEASE WITHOUT ESOPHAGITIS: ICD-10-CM

## 2023-07-05 RX ORDER — OMEPRAZOLE 20 MG/1
CAPSULE, DELAYED RELEASE ORAL
Qty: 90 CAPSULE | Refills: 1 | Status: SHIPPED | OUTPATIENT
Start: 2023-07-05

## 2023-07-11 ENCOUNTER — OFFICE VISIT (OUTPATIENT)
Dept: FAMILY MEDICINE CLINIC | Facility: CLINIC | Age: 73
End: 2023-07-11
Payer: COMMERCIAL

## 2023-07-11 ENCOUNTER — TELEPHONE (OUTPATIENT)
Dept: ADMINISTRATIVE | Facility: OTHER | Age: 73
End: 2023-07-11

## 2023-07-11 VITALS
TEMPERATURE: 97.8 F | DIASTOLIC BLOOD PRESSURE: 82 MMHG | BODY MASS INDEX: 32.51 KG/M2 | OXYGEN SATURATION: 95 % | WEIGHT: 201.4 LBS | HEART RATE: 61 BPM | SYSTOLIC BLOOD PRESSURE: 124 MMHG | RESPIRATION RATE: 18 BRPM

## 2023-07-11 DIAGNOSIS — Z87.891 FORMER SMOKER: ICD-10-CM

## 2023-07-11 DIAGNOSIS — M85.80 OSTEOPENIA, UNSPECIFIED LOCATION: ICD-10-CM

## 2023-07-11 DIAGNOSIS — K21.9 GASTROESOPHAGEAL REFLUX DISEASE, UNSPECIFIED WHETHER ESOPHAGITIS PRESENT: ICD-10-CM

## 2023-07-11 DIAGNOSIS — Z12.31 ENCOUNTER FOR SCREENING MAMMOGRAM FOR BREAST CANCER: ICD-10-CM

## 2023-07-11 DIAGNOSIS — I10 PRIMARY HYPERTENSION: ICD-10-CM

## 2023-07-11 DIAGNOSIS — R73.03 PRE-DIABETES: ICD-10-CM

## 2023-07-11 DIAGNOSIS — Z87.891 HISTORY OF SMOKING 30 OR MORE PACK YEARS: ICD-10-CM

## 2023-07-11 DIAGNOSIS — R56.9 GENERALIZED CONVULSIVE SEIZURE (HCC): ICD-10-CM

## 2023-07-11 DIAGNOSIS — R06.02 SOB (SHORTNESS OF BREATH): ICD-10-CM

## 2023-07-11 DIAGNOSIS — F39 MOOD DISORDER (HCC): ICD-10-CM

## 2023-07-11 DIAGNOSIS — Z00.00 MEDICARE ANNUAL WELLNESS VISIT, SUBSEQUENT: Primary | ICD-10-CM

## 2023-07-11 DIAGNOSIS — Z85.3 PERSONAL HISTORY OF BREAST CANCER: ICD-10-CM

## 2023-07-11 DIAGNOSIS — R06.09 DOE (DYSPNEA ON EXERTION): ICD-10-CM

## 2023-07-11 DIAGNOSIS — Z12.2 SCREENING FOR LUNG CANCER: ICD-10-CM

## 2023-07-11 DIAGNOSIS — E78.2 MIXED HYPERLIPIDEMIA: ICD-10-CM

## 2023-07-11 LAB — SL AMB POCT HEMOGLOBIN AIC: 6.3 (ref ?–6.5)

## 2023-07-11 PROCEDURE — 83036 HEMOGLOBIN GLYCOSYLATED A1C: CPT | Performed by: FAMILY MEDICINE

## 2023-07-11 PROCEDURE — 99214 OFFICE O/P EST MOD 30 MIN: CPT | Performed by: FAMILY MEDICINE

## 2023-07-11 PROCEDURE — G0439 PPPS, SUBSEQ VISIT: HCPCS | Performed by: FAMILY MEDICINE

## 2023-07-11 RX ORDER — VENLAFAXINE HYDROCHLORIDE 75 MG/1
75 CAPSULE, EXTENDED RELEASE ORAL DAILY
Qty: 90 CAPSULE | Refills: 3 | Status: SHIPPED | OUTPATIENT
Start: 2023-07-11

## 2023-07-11 RX ORDER — LOSARTAN POTASSIUM AND HYDROCHLOROTHIAZIDE 25; 100 MG/1; MG/1
1 TABLET ORAL DAILY
Qty: 90 TABLET | Refills: 3 | Status: SHIPPED | OUTPATIENT
Start: 2023-07-11

## 2023-07-11 NOTE — TELEPHONE ENCOUNTER
----- Message from Priti Bowling sent at 7/11/2023  7:44 AM EDT -----  07/11/23 7:44 AM    Hello, our patient Marino Loza. has had Mammogram completed/performed. Please assist in updating the patient chart by pulling the Care Everywhere (CE) document. The date of service is 3/6/23.      Thank you,  Priti CHOI

## 2023-07-11 NOTE — PATIENT INSTRUCTIONS
Medicare Preventive Visit Patient Instructions  Thank you for completing your Welcome to Medicare Visit or Medicare Annual Wellness Visit today. Your next wellness visit will be due in one year (7/11/2024). The screening/preventive services that you may require over the next 5-10 years are detailed below. Some tests may not apply to you based off risk factors and/or age. Screening tests ordered at today's visit but not completed yet may show as past due. Also, please note that scanned in results may not display below. Preventive Screenings:  Service Recommendations Previous Testing/Comments   Colorectal Cancer Screening  * Colonoscopy    * Fecal Occult Blood Test (FOBT)/Fecal Immunochemical Test (FIT)  * Fecal DNA/Cologuard Test  * Flexible Sigmoidoscopy Age: 43-73 years old   Colonoscopy: every 10 years (may be performed more frequently if at higher risk)  OR  FOBT/FIT: every 1 year  OR  Cologuard: every 3 years  OR  Sigmoidoscopy: every 5 years  Screening may be recommended earlier than age 39 if at higher risk for colorectal cancer. Also, an individualized decision between you and your healthcare provider will decide whether screening between the ages of 77-80 would be appropriate. Colonoscopy: 09/02/2020  FOBT/FIT: Not on file  Cologuard: Not on file  Sigmoidoscopy: Not on file    Screening Current     Breast Cancer Screening Age: 36 years old  Frequency: every 1-2 years  Not required if history of left and right mastectomy Mammogram: 02/14/2022    History Breast Cancer   Cervical Cancer Screening Between the ages of 21-29, pap smear recommended once every 3 years. Between the ages of 32-69, can perform pap smear with HPV co-testing every 5 years.    Recommendations may differ for women with a history of total hysterectomy, cervical cancer, or abnormal pap smears in past. Pap Smear: Not on file    Screening Not Indicated   Hepatitis C Screening Once for adults born between 1945 and 1965  More frequently in patients at high risk for Hepatitis C Hep C Antibody: 05/09/2022    Screening Current   Diabetes Screening 1-2 times per year if you're at risk for diabetes or have pre-diabetes Fasting glucose: 132 mg/dL (5/9/2022)  A1C: 6.1 % (5/9/2022)  Screening Not Indicated  History Diabetes   Cholesterol Screening Once every 5 years if you don't have a lipid disorder. May order more often based on risk factors. Lipid panel: 05/09/2022    Screening Not Indicated  History Lipid Disorder     Other Preventive Screenings Covered by Medicare:  1. Abdominal Aortic Aneurysm (AAA) Screening: covered once if your at risk. You're considered to be at risk if you have a family history of AAA. 2. Lung Cancer Screening: covers low dose CT scan once per year if you meet all of the following conditions: (1) Age 48-67; (2) No signs or symptoms of lung cancer; (3) Current smoker or have quit smoking within the last 15 years; (4) You have a tobacco smoking history of at least 20 pack years (packs per day multiplied by number of years you smoked); (5) You get a written order from a healthcare provider. 3. Glaucoma Screening: covered annually if you're considered high risk: (1) You have diabetes OR (2) Family history of glaucoma OR (3)  aged 48 and older OR (3)  American aged 72 and older  3. Osteoporosis Screening: covered every 2 years if you meet one of the following conditions: (1) You're estrogen deficient and at risk for osteoporosis based off medical history and other findings; (2) Have a vertebral abnormality; (3) On glucocorticoid therapy for more than 3 months; (4) Have primary hyperparathyroidism; (5) On osteoporosis medications and need to assess response to drug therapy. · Last bone density test (DXA Scan): 01/06/2022.  5. HIV Screening: covered annually if you're between the age of 15-65. Also covered annually if you are younger than 13 and older than 72 with risk factors for HIV infection.  For pregnant patients, it is covered up to 3 times per pregnancy. Immunizations:  Immunization Recommendations   Influenza Vaccine Annual influenza vaccination during flu season is recommended for all persons aged >= 6 months who do not have contraindications   Pneumococcal Vaccine   * Pneumococcal conjugate vaccine = PCV13 (Prevnar 13), PCV15 (Vaxneuvance), PCV20 (Prevnar 20)  * Pneumococcal polysaccharide vaccine = PPSV23 (Pneumovax) Adults 20-63 years old: 1-3 doses may be recommended based on certain risk factors  Adults 72 years old: 1-2 doses may be recommended based off what pneumonia vaccine you previously received   Hepatitis B Vaccine 3 dose series if at intermediate or high risk (ex: diabetes, end stage renal disease, liver disease)   Tetanus (Td) Vaccine - COST NOT COVERED BY MEDICARE PART B Following completion of primary series, a booster dose should be given every 10 years to maintain immunity against tetanus. Td may also be given as tetanus wound prophylaxis. Tdap Vaccine - COST NOT COVERED BY MEDICARE PART B Recommended at least once for all adults. For pregnant patients, recommended with each pregnancy. Shingles Vaccine (Shingrix) - COST NOT COVERED BY MEDICARE PART B  2 shot series recommended in those aged 48 and above     Health Maintenance Due:      Topic Date Due   • Breast Cancer Screening: Mammogram  02/14/2023   • Colorectal Cancer Screening  01/01/2030 (Originally 8/10/1993)   • Hepatitis C Screening  Completed     Immunizations Due:      Topic Date Due   • COVID-19 Vaccine (5 - Pfizer series) 07/13/2022   • Influenza Vaccine (1) 09/01/2023     Advance Directives   What are advance directives? Advance directives are legal documents that state your wishes and plans for medical care. These plans are made ahead of time in case you lose your ability to make decisions for yourself.  Advance directives can apply to any medical decision, such as the treatments you want, and if you want to donate organs. What are the types of advance directives? There are many types of advance directives, and each state has rules about how to use them. You may choose a combination of any of the following:  · Living will: This is a written record of the treatment you want. You can also choose which treatments you do not want, which to limit, and which to stop at a certain time. This includes surgery, medicine, IV fluid, and tube feedings. · Durable power of  for healthcare [facility]): This is a written record that states who you want to make healthcare choices for you when you are unable to make them for yourself. This person, called a proxy, is usually a family member or a friend. You may choose more than 1 proxy. · Do not resuscitate (DNR) order:  A DNR order is used in case your heart stops beating or you stop breathing. It is a request not to have certain forms of treatment, such as CPR. A DNR order may be included in other types of advance directives. · Medical directive: This covers the care that you want if you are in a coma, near death, or unable to make decisions for yourself. You can list the treatments you want for each condition. Treatment may include pain medicine, surgery, blood transfusions, dialysis, IV or tube feedings, and a ventilator (breathing machine). · Values history: This document has questions about your views, beliefs, and how you feel and think about life. This information can help others choose the care that you would choose. Why are advance directives important? An advance directive helps you control your care. Although spoken wishes may be used, it is better to have your wishes written down. Spoken wishes can be misunderstood, or not followed. Treatments may be given even if you do not want them. An advance directive may make it easier for your family to make difficult choices about your care.    Fall Prevention    Fall prevention  includes ways to make your home and other areas safer. It also includes ways you can move more carefully to prevent a fall. Health conditions that cause changes in your blood pressure, vision, or muscle strength and coordination may increase your risk for falls. Medicines may also increase your risk for falls if they make you dizzy, weak, or sleepy. Fall prevention tips:   · Stand or sit up slowly. · Use assistive devices as directed. · Wear shoes that fit well and have soles that . · Wear a personal alarm. · Stay active. · Manage your medical conditions. Home Safety Tips:  · Add items to prevent falls in the bathroom. · Keep paths clear. · Install bright lights in your home. · Keep items you use often on shelves within reach. · Paint or place reflective tape on the edges of your stairs. Urinary Incontinence   Urinary incontinence (UI)  is when you lose control of your bladder. UI develops because your bladder cannot store or empty urine properly. The 3 most common types of UI are stress incontinence, urge incontinence, or both. Medicines:   · May be given to help strengthen your bladder control. Report any side effects of medication to your healthcare provider. Do pelvic muscle exercises often:  Your pelvic muscles help you stop urinating. Squeeze these muscles tight for 5 seconds, then relax for 5 seconds. Gradually work up to squeezing for 10 seconds. Do 3 sets of 15 repetitions a day, or as directed. This will help strengthen your pelvic muscles and improve bladder control. Train your bladder:  Go to the bathroom at set times, such as every 2 hours, even if you do not feel the urge to go. You can also try to hold your urine when you feel the urge to go. For example, hold your urine for 5 minutes when you feel the urge to go. As that becomes easier, hold your urine for 10 minutes. Self-care:   · Keep a UI record. Write down how often you leak urine and how much you leak.  Make a note of what you were doing when you leaked urine. · Drink liquids as directed. You may need to limit the amount of liquid you drink to help control your urine leakage. Do not drink any liquid right before you go to bed. Limit or do not have drinks that contain caffeine or alcohol. · Prevent constipation. Eat a variety of high-fiber foods. Good examples are high-fiber cereals, beans, vegetables, and whole-grain breads. Walking is the best way to trigger your intestines to have a bowel movement. · Exercise regularly and maintain a healthy weight. Weight loss and exercise will decrease pressure on your bladder and help you control your leakage. · Use a catheter as directed  to help empty your bladder. A catheter is a tiny, plastic tube that is put into your bladder to drain your urine. · Go to behavior therapy as directed. Behavior therapy may be used to help you learn to control your urge to urinate. Weight Management   Why it is important to manage your weight:  Being overweight increases your risk of health conditions such as heart disease, high blood pressure, type 2 diabetes, and certain types of cancer. It can also increase your risk for osteoarthritis, sleep apnea, and other respiratory problems. Aim for a slow, steady weight loss. Even a small amount of weight loss can lower your risk of health problems. How to lose weight safely:  A safe and healthy way to lose weight is to eat fewer calories and get regular exercise. You can lose up about 1 pound a week by decreasing the number of calories you eat by 500 calories each day. Healthy meal plan for weight management:  A healthy meal plan includes a variety of foods, contains fewer calories, and helps you stay healthy. A healthy meal plan includes the following:  · Eat whole-grain foods more often. A healthy meal plan should contain fiber. Fiber is the part of grains, fruits, and vegetables that is not broken down by your body.  Whole-grain foods are healthy and provide extra fiber in your diet. Some examples of whole-grain foods are whole-wheat breads and pastas, oatmeal, brown rice, and bulgur. · Eat a variety of vegetables every day. Include dark, leafy greens such as spinach, kale, jeff greens, and mustard greens. Eat yellow and orange vegetables such as carrots, sweet potatoes, and winter squash. · Eat a variety of fruits every day. Choose fresh or canned fruit (canned in its own juice or light syrup) instead of juice. Fruit juice has very little or no fiber. · Eat low-fat dairy foods. Drink fat-free (skim) milk or 1% milk. Eat fat-free yogurt and low-fat cottage cheese. Try low-fat cheeses such as mozzarella and other reduced-fat cheeses. · Choose meat and other protein foods that are low in fat. Choose beans or other legumes such as split peas or lentils. Choose fish, skinless poultry (chicken or turkey), or lean cuts of red meat (beef or pork). Before you cook meat or poultry, cut off any visible fat. · Use less fat and oil. Try baking foods instead of frying them. Add less fat, such as margarine, sour cream, regular salad dressing and mayonnaise to foods. Eat fewer high-fat foods. Some examples of high-fat foods include french fries, doughnuts, ice cream, and cakes. · Eat fewer sweets. Limit foods and drinks that are high in sugar. This includes candy, cookies, regular soda, and sweetened drinks. Exercise:  Exercise at least 30 minutes per day on most days of the week. Some examples of exercise include walking, biking, dancing, and swimming. You can also fit in more physical activity by taking the stairs instead of the elevator or parking farther away from stores. Ask your healthcare provider about the best exercise plan for you. © Copyright [employer] 2018 Information is for End User's use only and may not be sold, redistributed or otherwise used for commercial purposes.  All illustrations and images included in CareNotes® are the copyrighted property of [employer] or [address] [place] [address]    Fall Prevention   AMBULATORY CARE:   Fall prevention  includes ways to make your home and other areas safer. It also includes ways you can move more carefully to prevent a fall. Health conditions that cause changes in your blood pressure, vision, or muscle strength and coordination may increase your risk for falls. Medicines may also increase your risk for falls if they make you dizzy, weak, or sleepy. Call your local emergency number (911 in the [address]) or have someone call if:   • You have fallen and are unconscious. • You have fallen and cannot move part of your body. Call your doctor if:   • You have fallen and have pain or a headache. • You have questions or concerns about your condition or care. Fall prevention tips:   • Stand or sit up slowly. This may help you keep your balance and prevent falls. • Use assistive devices as directed. Your healthcare provider may suggest that you use a cane or walker to help you keep your balance. You may need to have grab bars put in your bathroom near the toilet or in the shower. • Wear shoes that fit well and have soles that . Wear shoes both inside and outside. Use slippers with good . Do not wear shoes with high heels. • Wear a personal alarm. This is a device that allows you to call 911 if you fall and need help. Ask your healthcare provider for more information. • Stay active. Exercise can help strengthen your muscles and improve your balance. Your healthcare provider may recommend water aerobics or walking. He or she may also recommend physical therapy to improve your coordination. Never start an exercise program without talking to your healthcare provider first.         • Manage your medical conditions. Keep all appointments with your healthcare providers. Visit your eye doctor as directed. Home safety tips:       • Add items to prevent falls in the bathroom.   Put nonslip strips on your bath or shower floor to prevent you from slipping. Use a bath mat if you do not have carpet in the bathroom. This will prevent you from falling when you step out of the bath or shower. Use a shower seat so you do not need to stand while you shower. Sit on the toilet or a chair in your bathroom to dry yourself and put on clothing. This will prevent you from losing your balance from drying or dressing yourself while you are standing. • Keep paths clear. Remove books, shoes, and other objects from walkways and stairs. Place cords for telephones and lamps out of the way so that you do not need to walk over them. Tape them down if you cannot move them. Remove small rugs. If you cannot remove a rug, secure it with double-sided tape. This will prevent you from tripping. • Install bright lights in your home. Use night lights to help light paths to the bathroom or kitchen. Always turn on the light before you start walking. • Keep items you use often on shelves within reach. Do not use a step stool to help you reach an item. • Paint or place reflective tape on the edges of your stairs. This will help you see the stairs better. Follow up with your doctor as directed:  Write down your questions so you remember to ask them during your visits. © Copyright [employer] 2022 Information is for End User's use only and may not be sold, redistributed or otherwise used for commercial purposes. The above information is an  only. It is not intended as medical advice for individual conditions or treatments. Talk to your doctor, nurse or pharmacist before following any medical regimen to see if it is safe and effective for you.

## 2023-07-11 NOTE — PROGRESS NOTES
Assessment and Plan:     AMW - will get screening CT. She has 30+ pack year h/o smoking, just quit 2 years ago and is with sob/bello. She is on board. Will get lipid panel. BP is well controlled. Preventive health issues were discussed with patient, and age appropriate screening tests were ordered as noted in patient's After Visit Summary. Personalized health advice and appropriate referrals for health education or preventive services given if needed, as noted in patient's After Visit Summary. Return in about 6 months (around 1/11/2024). Patient/Caretaker verbalized understanding and were in agreement with today's assessment and plan. Time was taken to address any questions patient/caretaker had. Chief Complaint   Patient presents with   • Medicare Wellness Visit          History of Present Illness:     Patient presents for a Medicare Wellness Visit       Patient Care Team:  Leah Yung MD as PCP - General (Family Medicine)     Review of Systems:     Review of Systems   All other systems reviewed and are negative.        Problem List:     Patient Active Problem List   Diagnosis   • Mixed hyperlipidemia   • Gastroesophageal reflux disease   • Generalized convulsive seizure ([address])   • Pre-diabetes   • Personal history of breast cancer   • Mood disorder ([address])   • Primary hypertension   • Malignant neoplasm of right breast in female, estrogen receptor positive, unspecified site of breast ([address])   • Chronic bursitis of right shoulder   • Arthritis of carpometacarpal (CMC) joint of left thumb   • Osteopenia   • Mixed stress and urge urinary incontinence      Past Medical and Surgical History:     Past Medical History:   Diagnosis Date   • Breast cancer ([address])      Past Surgical History:   Procedure Laterality Date   • HYSTERECTOMY     • MASTECTOMY        Family History:     Family History   Problem Relation Age of Onset   • Cancer Mother    • Heart disease Mother    • Diabetes Mother    • Hypertension Mother    • Heart disease Father    • Diabetes Father    • Hypertension Father    • Cancer Sister    • Diabetes Sister    • Cancer Brother    • Diabetes Brother    • Asthma Son    • Hypertension Son    • Kidney disease Daughter       Social History:     Social History     Socioeconomic History   • Marital status:      Spouse name: None   • Number of children: None   • Years of education: None   • Highest education level: None   Occupational History   • None   Tobacco Use   • Smoking status: Former     Years: 30.00     Types: Cigarettes     Start date: 1964     Quit date: 2021     Years since quittin.5   • Smokeless tobacco: Never   Vaping Use   • Vaping Use: Never used   Substance and Sexual Activity   • Alcohol use: Not Currently   • Drug use: Never   • Sexual activity: Not Currently   Other Topics Concern   • None   Social History Narrative   • None     Social Determinants of Health     Financial Resource Strain: Low Risk  (2023)    Overall Financial Resource Strain (CARDIA)    • Difficulty of Paying Living Expenses: Not hard at all   Food Insecurity: No Food Insecurity (2023)    Hunger Vital Sign    • Worried About Running Out of Food in the Last Year: Never true    • Ran Out of Food in the Last Year: Never true   Transportation Needs: No Transportation Needs (2023)    PRAPARE - Transportation    • Lack of Transportation (Medical): No    • Lack of Transportation (Non-Medical): No   Physical Activity: Insufficiently Active (2023)    Exercise Vital Sign    • Days of Exercise per Week: 3 days    • Minutes of Exercise per Session: 20 min   Stress: No Stress Concern Present (2023)    [facility]    • Feeling of Stress : Only a little   Social Connections:  Moderately Integrated (2023)    Social Connection and Isolation Panel [NHANES]    • Frequency of Communication with Friends and Family: More than three times a week    • Frequency of Social Gatherings with Friends and Family: Twice a week    • Attends Voodoo Services: 1 to 4 times per year    • Active Member of Clubs or Organizations: Yes    • Attends Club or Organization Meetings: 1 to 4 times per year    • Marital Status:     Intimate Partner Violence: Not At Risk (1/9/2023)    Humiliation, Afraid, Rape, and Kick questionnaire    • Fear of Current or Ex-Partner: No    • Emotionally Abused: No    • Physically Abused: No    • Sexually Abused: No   Housing Stability: Low Risk  (1/9/2023)    Housing Stability Vital Sign    • Unable to Pay for Housing in the Last Year: No    • Number of Places Lived in the Last Year: 1    • Unstable Housing in the Last Year: No      Medications and Allergies:     Current Outpatient Medications   Medication Sig Dispense Refill   • acetaminophen (TYLENOL) 500 mg tablet Take 500 mg by mouth     • aspirin 81 mg chewable tablet Chew 81 mg     • calcium citrate-vitamin D (Calcium Citrate + D3) 315-200 MG-UNIT per tablet Take 2 tablets by mouth 2 (two) times a day 360 tablet 0   • Cholecalciferol 50 MCG (2000 UT) TABS Take 1 tablet (2,000 Units total) by mouth every morning 90 tablet 3   • cycloSPORINE (Restasis) 0.05 % ophthalmic emulsion      • levETIRAcetam (KEPPRA) 1000 MG tablet Take 1 tablet (1,000 mg total) by mouth 2 (two) times a day 180 tablet 3   • losartan-hydrochlorothiazide (HYZAAR) 100-25 MG per tablet Take 1 tablet by mouth daily 90 tablet 3   • omeprazole (PriLOSEC) 20 mg delayed release capsule take 1 capsule by mouth once daily 90 capsule 1   • simvastatin (ZOCOR) 40 mg tablet Take 1 tablet (40 mg total) by mouth daily at bedtime 100 tablet 3   • venlafaxine (EFFEXOR-XR) 75 mg 24 hr capsule Take 1 capsule (75 mg total) by mouth daily 90 capsule 3   • carbamide peroxide (DEBROX) 6.5 % otic solution Administer 5 drops into the left ear 2 (two) times a day for 4 days 15 mL 0     No current facility-administered medications for this visit. Allergies   Allergen Reactions   • Pollen Extract Other (See Comments)      Immunizations:     Immunization History   Administered Date(s) Administered   • COVID-19 PFIZER VACCINE 0.3 ML IM 03/17/2021, 04/07/2021, 10/20/2021   • COVID-19 Pfizer vac (Julien-sucrose, gray cap) 12 yr+ IM 05/18/2022   • Influenza Quadrivalent Preservative Free 3 years and older IM 09/15/2018   • Influenza, high dose seasonal 0.7 mL 11/02/2021   • Influenza, seasonal, injectable 08/12/2014, 09/17/2015, 09/07/2016, 08/18/2017   • Pneumococcal Conjugate 13-Valent 04/27/2016, 06/05/2019   • Pneumococcal Polysaccharide PPV23 11/05/2013, 03/18/2014, 07/13/2020   • Tdap 09/17/2012   • Zoster Vaccine Recombinant 02/18/2020, 07/13/2020   • influenza, trivalent, adjuvanted 09/16/2019      Health Maintenance:         Topic Date Due   • Breast Cancer Screening: Mammogram  02/14/2023   • Colorectal Cancer Screening  01/01/2030 (Originally 8/10/1995)   • Hepatitis C Screening  Completed         Topic Date Due   • COVID-19 Vaccine (5 - Pfizer series) 07/13/2022   • Influenza Vaccine (1) 09/01/2023      Medicare Screening Tests and Risk Assessments:     Last Medicare Wellness visit information reviewed, patient interviewed and updates made to the record today. Health Risk Assessment:   Patient rates overall health as good. Patient feels that their physical health rating is same. Patient is very satisfied with their life. Eyesight was rated as slightly worse. Hearing was rated as slightly worse. Patient feels that their emotional and mental health rating is much better. Patients states they are never, rarely angry. Patient states they are often unusually tired/fatigued. Pain experienced in the last 7 days has been some. Patient's pain rating has been 4/10. Patient states that she has experienced no weight loss or gain in last 6 months.      Depression Screening:   PHQ-2 Score: 0      Fall Risk Screening: In the past year, patient has experienced: history of falling in past year    Number of falls: 1  Injured during fall?: No    Feels unsteady when standing or walking?: Yes    Worried about falling?: No      Urinary Incontinence Screening:   Patient has leaked urine accidently in the last six months. Home Safety:  Patient has trouble with stairs inside or outside of their home. Patient has working smoke alarms and has no working carbon monoxide detector. Home safety hazards include: none. Nutrition:   Current diet is Frequent junk food. Medications:   Patient is currently taking over-the-counter supplements. OTC medications include: see medication list. Patient is able to manage medications. Activities of Daily Living (ADLs)/Instrumental Activities of Daily Living (IADLs):   Walk and transfer into and out of bed and chair?: Yes  Dress and groom yourself?: Yes    Bathe or shower yourself?: Yes    Feed yourself? Yes  Do your laundry/housekeeping?: Yes  Manage your money, pay your bills and track your expenses?: Yes  Make your own meals?: Yes    Do your own shopping?: Yes    Previous Hospitalizations:   Any hospitalizations or ED visits within the last 12 months?: No      Advance Care Planning:   Living will: Yes    Durable POA for healthcare:  Yes    Advanced directive: Yes    Advanced directive counseling given: Yes    Five wishes given: Yes      PREVENTIVE SCREENINGS      Cardiovascular Screening:    General: History Lipid Disorder, Risks and Benefits Discussed and Screening Current      Diabetes Screening:     General: History Diabetes, Risks and Benefits Discussed and Screening Current      Colorectal Cancer Screening:     General: Screening Current      Breast Cancer Screening:     General: History Breast Cancer, Screening Current and Risks and Benefits Discussed      Cervical Cancer Screening:    General: Screening Not Indicated      Osteoporosis Screening:    General: Risks and Benefits Discussed and Screening Current      Abdominal Aortic Aneurysm (AAA) Screening:        General: Screening Not Indicated      Lung Cancer Screening:     General: Risks and Benefits Discussed    Due for: Low Dose CT (LDCT)      Hepatitis C Screening:    General: Screening Current    Screening, Brief Intervention, and Referral to Treatment (SBIRT)    Screening  Typical number of drinks in a day: 1  Typical number of drinks in a week: 5  Interpretation: Low risk drinking behavior. Single Item Drug Screening:  How often have you used an illegal drug (including marijuana) or a prescription medication for non-medical reasons in the past year? never    Single Item Drug Screen Score: 0  Interpretation: Negative screen for possible drug use disorder    Other Counseling Topics:   Car/seat belt/driving safety, skin self-exam, sunscreen and calcium and vitamin D intake and regular weightbearing exercise. No results found. Physical Exam:     /82   Pulse 61   Temp 97.8 °F (36.6 °C) (Temporal)   Resp 18   Wt 91.4 kg (201 lb 6.4 oz)   SpO2 95%   BMI 32.51 kg/m²     Physical Exam  Vitals and nursing note reviewed. Constitutional:       Appearance: Normal appearance. Neurological:      General: No focal deficit present. Mental Status: She is alert and oriented to person, place, and time. Psychiatric:         Mood and Affect: Mood normal.         Behavior: Behavior normal.         Thought Content: Thought content normal.         Judgment: Judgment normal.          Leida Ferrari DO     Falls Plan of Care: Balance, strength, and gait training instructions were provided.

## 2023-07-11 NOTE — TELEPHONE ENCOUNTER
Upon review of the In Basket request we were able to locate, review, and update the patient chart as requested for Mammogram.    Any additional questions or concerns should be emailed to the Practice Liaisons via the appropriate education email address, please do not reply via In Basket.     Thank you  Kathia Jay

## 2023-07-11 NOTE — PROGRESS NOTES
Assessment/Plan:    1. Malignant neoplasm of right breast in female, estrogen receptor positive, unspecified site of breast [facility])  - she has adequate f/u with Guillermina Kumar MD - hem/onc with [facility]. Most recent note reviewed via Care Everywhere. She is now off of tamoxifen, completed 5 years of this. She has continuous surveillance of her breasts per protocol as well, she is s/p R mastectomy. Will update Care Gap. 2. Ostopenia   - will have her do:  I recommend calcium 1200 mg daily in divided doses and vitamin D 2000 units daily for improved bone quality. Our recommendations are Calcium Citrate for better absorption. 3. Pre-diabetes  - had X 1 A1C of 6.5. She is not on Meds. We discussed imp of diet and exercise to keep from transitioning to DM. She acknowledges this. She is not able to do routine exercise due to aches and pains. Had [facility] and this was wnl. She is on ARB therapy. She is on statin. Her A1C is creeping up and we discussed this today. Lab Results   Component Value Date    HGBA1C 6.3 07/11/2023     4. Mood disorder ([address])  - stable on Effexor. No si/hi. 7. Primary hypertension  - stable on ARB therapy. Risk factor modifications discussed. Mixed HLD - on statin. Discussed the importance of maintaining a healthy lifestyle through heart healthy diet and exercise. Seizure Disorder - has been well maintained on keppra. I advised her to stay with her Neurologist as it can be very difficult in the long run to get plugged back in. She is on board. Former smoker/sob/bello  Has not had CT scan. She is a candidate as she quit < 15 years ago and smoked 30+ pack years. She is on board. If negative, will pursue PFTs. She is on board. Praised for ongoing cessation. - CT lung screening program; Future    History of smoking 30 or more pack years  See above.     - CT lung screening program; Future    Screening for lung cancer  - CT lung screening program; Future    Gastroesophageal reflux disease, unspecified whether esophagitis present  - stable on PPI. Avoid food triggers and lying flat within two hours after eating. Return in about 6 months (around 1/11/2024). Patient/Caretaker verbalized understanding and were in agreement with today's assessment and plan. Time was taken to address any questions patient/caretaker had. Indication/Risks/Benefits of medication(s) as prescribed were discussed with the patient/caretaker. The patient verbalized understanding and agreement and elects to take medications as prescribed. Time was taken to answer any questions the patient/caretaker may have had. Chief Complaint   Patient presents with   • Medicare Wellness Visit     BMI Counseling: There is no height or weight on file to calculate BMI. The BMI is above normal. Nutrition recommendations include decreasing portion sizes, encouraging healthy choices of fruits and vegetables, decreasing fast food intake, consuming healthier snacks, limiting drinks that contain sugar, reducing intake of saturated and trans fat and reducing intake of cholesterol. Exercise recommendations include exercising 3-5 times per week and strength training exercises. Rationale for BMI follow-up plan is due to patient being overweight or obese. Subjective:      Patient ID: Moira Sosa is a 74 y.o. female. Anx/Depression -- Effexor. She is stable. She lives at home alone. She does have 2 daughters and grandchildren whom she enjoys. No si/hi. Pre-DM -- not on medications for this and prefers this. She is working to be good with her diet - though increased and she acknowledges there is room for improvement. Cannot do routine exercise due to pain. Lab Results   Component Value Date    HGBA1C 6.3 07/11/2023     Breast Ca - sees her oncologist for this. She has yearly mammograms. She is s/p R breast removal.  No issues or problems to report with this.   She is no longer on HRT - fulfilled 5y of therapy. HTN -- Taking her meds as Rx. No CP but is with sob/bello that she notes is worsening. Started smoking as a teen and just quit 2 years ago. Open to CT scan. No cough or wheezing that she notes. Has not had CT scan. HLD - on simvastatin and takes this daily. Lipids are not optimally controlled - reviewed with her today. She is due for recheck. Seizure Disorder - on Keppra and has not had a seizure in 5 years. She does see a neurologist.      The following portions of the patient's history were reviewed and updated as appropriate: allergies, current medications, past family history, past medical history, past social history, past surgical history and problem list.    Review of Systems   All other systems reviewed and are negative. Objective:      /82   Pulse 61   Temp 97.8 °F (36.6 °C) (Temporal)   Resp 18   Wt 91.4 kg (201 lb 6.4 oz)   SpO2 95%   BMI 32.51 kg/m²          Physical Exam  Vitals and nursing note reviewed. Constitutional:       General: She is not in acute distress. Appearance: Normal appearance. She is not ill-appearing or toxic-appearing. HENT:      Head: Normocephalic and atraumatic. Right Ear: Tympanic membrane and ear canal normal.      Left Ear: Tympanic membrane and ear canal normal.      Mouth/Throat:      Mouth: Mucous membranes are moist.      Pharynx: Oropharynx is clear. Eyes:      Conjunctiva/sclera: Conjunctivae normal.      Pupils: Pupils are equal, round, and reactive to light. Neck:      Vascular: No carotid bruit. Comments: There is a mobile soft tissue mass just below the SC area, pt tells me it has been there from ~ 13 years. Cardiovascular:      Rate and Rhythm: Normal rate and regular rhythm. Pulses: Normal pulses. Heart sounds: Normal heart sounds. Pulmonary:      Effort: Pulmonary effort is normal.      Breath sounds: Normal breath sounds.  No wheezing, rhonchi or rales. Abdominal:      General: Bowel sounds are normal.      Palpations: Abdomen is soft. Tenderness: There is no abdominal tenderness. There is no guarding. Musculoskeletal:         General: No swelling. Cervical back: Neck supple. Lymphadenopathy:      Cervical: No cervical adenopathy. Skin:     General: Skin is warm and dry. Neurological:      General: No focal deficit present. Mental Status: She is alert and oriented to person, place, and time. Psychiatric:         Mood and Affect: Mood normal.         Behavior: Behavior normal.         Thought Content:  Thought content normal.         Judgment: Judgment normal.

## 2023-09-05 ENCOUNTER — TELEPHONE (OUTPATIENT)
Dept: FAMILY MEDICINE CLINIC | Facility: CLINIC | Age: 73
End: 2023-09-05

## 2023-09-05 DIAGNOSIS — R39.9 UTI SYMPTOMS: Primary | ICD-10-CM

## 2023-09-05 RX ORDER — SULFAMETHOXAZOLE AND TRIMETHOPRIM 800; 160 MG/1; MG/1
1 TABLET ORAL EVERY 12 HOURS SCHEDULED
Qty: 6 TABLET | Refills: 0 | Status: SHIPPED | OUTPATIENT
Start: 2023-09-05 | End: 2023-09-08

## 2023-09-05 NOTE — TELEPHONE ENCOUNTER
I believe I have a urinary tract infection. My urine is cloudy, strong and I go to the bathroom a little bit more than I usually do. If you can get back to me as soon as possible I would appreciate it. Thank you.

## 2023-09-11 ENCOUNTER — LAB (OUTPATIENT)
Dept: LAB | Facility: CLINIC | Age: 73
End: 2023-09-11
Payer: COMMERCIAL

## 2023-09-11 ENCOUNTER — TELEPHONE (OUTPATIENT)
Dept: FAMILY MEDICINE CLINIC | Facility: CLINIC | Age: 73
End: 2023-09-11

## 2023-09-11 DIAGNOSIS — G40.909 SEIZURE DISORDER (HCC): ICD-10-CM

## 2023-09-11 DIAGNOSIS — Z12.31 ENCOUNTER FOR SCREENING MAMMOGRAM FOR BREAST CANCER: ICD-10-CM

## 2023-09-11 DIAGNOSIS — Z17.0 MALIGNANT NEOPLASM OF RIGHT BREAST IN FEMALE, ESTROGEN RECEPTOR POSITIVE, UNSPECIFIED SITE OF BREAST: ICD-10-CM

## 2023-09-11 DIAGNOSIS — R56.9 GENERALIZED CONVULSIVE SEIZURE (HCC): ICD-10-CM

## 2023-09-11 DIAGNOSIS — I10 PRIMARY HYPERTENSION: ICD-10-CM

## 2023-09-11 DIAGNOSIS — K21.9 GASTROESOPHAGEAL REFLUX DISEASE, UNSPECIFIED WHETHER ESOPHAGITIS PRESENT: ICD-10-CM

## 2023-09-11 DIAGNOSIS — E78.2 MIXED HYPERLIPIDEMIA: ICD-10-CM

## 2023-09-11 DIAGNOSIS — M18.12 ARTHRITIS OF CARPOMETACARPAL (CMC) JOINT OF LEFT THUMB: ICD-10-CM

## 2023-09-11 DIAGNOSIS — F39 MOOD DISORDER (HCC): ICD-10-CM

## 2023-09-11 DIAGNOSIS — E11.9 TYPE 2 DIABETES MELLITUS WITHOUT COMPLICATION, WITHOUT LONG-TERM CURRENT USE OF INSULIN (HCC): ICD-10-CM

## 2023-09-11 DIAGNOSIS — M75.51 CHRONIC BURSITIS OF RIGHT SHOULDER: ICD-10-CM

## 2023-09-11 DIAGNOSIS — R73.03 PRE-DIABETES: ICD-10-CM

## 2023-09-11 DIAGNOSIS — Z00.00 MEDICARE ANNUAL WELLNESS VISIT, SUBSEQUENT: ICD-10-CM

## 2023-09-11 DIAGNOSIS — C50.911 MALIGNANT NEOPLASM OF RIGHT BREAST IN FEMALE, ESTROGEN RECEPTOR POSITIVE, UNSPECIFIED SITE OF BREAST: ICD-10-CM

## 2023-09-11 LAB
ALBUMIN SERPL BCP-MCNC: 4.3 G/DL (ref 3.5–5)
ALP SERPL-CCNC: 69 U/L (ref 34–104)
ALT SERPL W P-5'-P-CCNC: 13 U/L (ref 7–52)
ANION GAP SERPL CALCULATED.3IONS-SCNC: 9 MMOL/L
AST SERPL W P-5'-P-CCNC: 13 U/L (ref 13–39)
BILIRUB SERPL-MCNC: 0.4 MG/DL (ref 0.2–1)
BUN SERPL-MCNC: 31 MG/DL (ref 5–25)
CALCIUM SERPL-MCNC: 9.6 MG/DL (ref 8.4–10.2)
CHLORIDE SERPL-SCNC: 105 MMOL/L (ref 96–108)
CHOLEST SERPL-MCNC: 216 MG/DL
CO2 SERPL-SCNC: 27 MMOL/L (ref 21–32)
CREAT SERPL-MCNC: 0.84 MG/DL (ref 0.6–1.3)
EST. AVERAGE GLUCOSE BLD GHB EST-MCNC: 143 MG/DL
GFR SERPL CREATININE-BSD FRML MDRD: 68 ML/MIN/1.73SQ M
GLUCOSE P FAST SERPL-MCNC: 119 MG/DL (ref 65–99)
HBA1C MFR BLD: 6.6 %
HDLC SERPL-MCNC: 62 MG/DL
LDLC SERPL CALC-MCNC: 105 MG/DL (ref 0–100)
NONHDLC SERPL-MCNC: 154 MG/DL
POTASSIUM SERPL-SCNC: 4.2 MMOL/L (ref 3.5–5.3)
PROT SERPL-MCNC: 6.6 G/DL (ref 6.4–8.4)
SODIUM SERPL-SCNC: 141 MMOL/L (ref 135–147)
TRIGL SERPL-MCNC: 244 MG/DL
TSH SERPL DL<=0.05 MIU/L-ACNC: 1.58 UIU/ML (ref 0.45–4.5)

## 2023-09-11 PROCEDURE — 80053 COMPREHEN METABOLIC PANEL: CPT

## 2023-09-11 PROCEDURE — 83036 HEMOGLOBIN GLYCOSYLATED A1C: CPT

## 2023-09-11 PROCEDURE — 80061 LIPID PANEL: CPT

## 2023-09-11 PROCEDURE — 36415 COLL VENOUS BLD VENIPUNCTURE: CPT

## 2023-09-11 PROCEDURE — 84443 ASSAY THYROID STIM HORMONE: CPT

## 2023-11-13 ENCOUNTER — DOCTOR'S OFFICE (OUTPATIENT)
Dept: URBAN - NONMETROPOLITAN AREA CLINIC 1 | Facility: CLINIC | Age: 73
Setting detail: OPHTHALMOLOGY
End: 2023-11-13
Payer: MEDICARE

## 2023-11-13 DIAGNOSIS — S05.02XA: ICD-10-CM

## 2023-11-13 DIAGNOSIS — H04.123: ICD-10-CM

## 2023-11-13 PROBLEM — H04.121 DRY EYE SYNDROME LACRIMAL GLAND; RIGHT EYE, LEFT EYE, BOTH EYES: Status: ACTIVE | Noted: 2023-11-13

## 2023-11-13 PROBLEM — H04.122 DRY EYE SYNDROME LACRIMAL GLAND; RIGHT EYE, LEFT EYE, BOTH EYES: Status: ACTIVE | Noted: 2023-11-13

## 2023-11-13 PROCEDURE — 92012 INTRM OPH EXAM EST PATIENT: CPT | Performed by: OPTOMETRIST

## 2023-11-13 ASSESSMENT — REFRACTION_MANIFEST
OS_ADD: +2.50
OD_AXIS: 180
OS_AXIS: 025
OD_VA1: 20/20
OS_VA1: 20/20
OD_VA2: 20/25
OS_VA2: 20/25
OU_VA: 20/20
OS_CYLINDER: -1.25
OD_CYLINDER: -1.25
OD_ADD: +2.50
OS_SPHERE: +0.50
OD_SPHERE: +0.25

## 2023-11-13 ASSESSMENT — AXIALLENGTH_DERIVED
OS_AL: 22.0424
OD_AL: 22.178
OD_AL: 22.1351
OS_AL: 22.1707

## 2023-11-13 ASSESSMENT — REFRACTION_CURRENTRX
OS_CYLINDER: -1.25
OD_CYLINDER: -1.25
OD_AXIS: 178
OD_ADD: +2.75
OD_SPHERE: +0.25
OS_SPHERE: +0.25
OS_AXIS: 022
OS_OVR_VA: 20/
OD_VPRISM_DIRECTION: TRF
OS_VPRISM_DIRECTION: TRF
OD_OVR_VA: 20/
OS_ADD: +2.75

## 2023-11-13 ASSESSMENT — KERATOMETRY
OS_K2POWER_DIOPTERS: 47.75
OS_AXISANGLE_DEGREES: 007
OD_K2POWER_DIOPTERS: 48.25
OD_AXISANGLE_DEGREES: 180
OS_K1POWER_DIOPTERS: 47.75
OD_K1POWER_DIOPTERS: 48.00

## 2023-11-13 ASSESSMENT — PUNCTA - ASSESSMENT
OS_PUNCTA: SIL PLUG LLL SMALL
OD_PUNCTA: SIL PLUG RLL SMALL

## 2023-11-13 ASSESSMENT — LID EXAM ASSESSMENTS
OS_BLEPHARITIS: ABSENT
OD_BLEPHARITIS: ABSENT

## 2023-11-13 ASSESSMENT — REFRACTION_AUTOREFRACTION
OD_AXIS: 001
OS_AXIS: 008
OS_CYLINDER: -1.50
OS_SPHERE: +1.00
OD_CYLINDER: -3.00
OD_SPHERE: +1.00

## 2023-11-13 ASSESSMENT — VISUAL ACUITY
OS_BCVA: 20/25
OD_BCVA: 20/25

## 2023-11-13 ASSESSMENT — TEAR BREAK UP TIME (TBUT)
OS_TBUT: 5-6 SEC
OD_TBUT: 5-6SEC

## 2023-11-13 ASSESSMENT — SPHEQUIV_DERIVED
OS_SPHEQUIV: 0.25
OD_SPHEQUIV: -0.375
OD_SPHEQUIV: -0.5
OS_SPHEQUIV: -0.125

## 2023-11-13 ASSESSMENT — CONFRONTATIONAL VISUAL FIELD TEST (CVF)
OS_FINDINGS: FULL
OD_FINDINGS: FULL

## 2023-11-13 ASSESSMENT — TONOMETRY
OD_IOP_MMHG: 16
OS_IOP_MMHG: 16

## 2023-11-13 ASSESSMENT — SUPERFICIAL PUNCTATE KERATITIS (SPK): OS_SPK: T 1+

## 2024-01-15 ENCOUNTER — OFFICE VISIT (OUTPATIENT)
Dept: FAMILY MEDICINE CLINIC | Facility: CLINIC | Age: 74
End: 2024-01-15
Payer: COMMERCIAL

## 2024-01-15 VITALS
TEMPERATURE: 96.1 F | OXYGEN SATURATION: 98 % | BODY MASS INDEX: 31.92 KG/M2 | SYSTOLIC BLOOD PRESSURE: 118 MMHG | DIASTOLIC BLOOD PRESSURE: 70 MMHG | HEART RATE: 56 BPM | HEIGHT: 66 IN | WEIGHT: 198.6 LBS

## 2024-01-15 DIAGNOSIS — R39.9 UTI SYMPTOMS: ICD-10-CM

## 2024-01-15 DIAGNOSIS — Z12.31 ENCOUNTER FOR SCREENING MAMMOGRAM FOR BREAST CANCER: ICD-10-CM

## 2024-01-15 DIAGNOSIS — C50.911 MALIGNANT NEOPLASM OF RIGHT BREAST IN FEMALE, ESTROGEN RECEPTOR POSITIVE, UNSPECIFIED SITE OF BREAST: ICD-10-CM

## 2024-01-15 DIAGNOSIS — Z23 ENCOUNTER FOR IMMUNIZATION: ICD-10-CM

## 2024-01-15 DIAGNOSIS — K21.9 GASTROESOPHAGEAL REFLUX DISEASE WITHOUT ESOPHAGITIS: ICD-10-CM

## 2024-01-15 DIAGNOSIS — F39 MOOD DISORDER (HCC): ICD-10-CM

## 2024-01-15 DIAGNOSIS — E78.2 MIXED HYPERLIPIDEMIA: ICD-10-CM

## 2024-01-15 DIAGNOSIS — R56.9 GENERALIZED CONVULSIVE SEIZURE (HCC): ICD-10-CM

## 2024-01-15 DIAGNOSIS — Z17.0 MALIGNANT NEOPLASM OF RIGHT BREAST IN FEMALE, ESTROGEN RECEPTOR POSITIVE, UNSPECIFIED SITE OF BREAST: ICD-10-CM

## 2024-01-15 DIAGNOSIS — I10 PRIMARY HYPERTENSION: ICD-10-CM

## 2024-01-15 DIAGNOSIS — N39.46 MIXED STRESS AND URGE URINARY INCONTINENCE: ICD-10-CM

## 2024-01-15 DIAGNOSIS — K21.9 GASTROESOPHAGEAL REFLUX DISEASE, UNSPECIFIED WHETHER ESOPHAGITIS PRESENT: ICD-10-CM

## 2024-01-15 DIAGNOSIS — G40.909 SEIZURE DISORDER (HCC): ICD-10-CM

## 2024-01-15 DIAGNOSIS — E11.9 TYPE 2 DIABETES MELLITUS WITHOUT COMPLICATION, WITHOUT LONG-TERM CURRENT USE OF INSULIN (HCC): Primary | ICD-10-CM

## 2024-01-15 LAB
BACTERIA UR QL AUTO: ABNORMAL /HPF
BILIRUB UR QL STRIP: NEGATIVE
CLARITY UR: CLEAR
COLOR UR: YELLOW
GLUCOSE UR STRIP-MCNC: NEGATIVE MG/DL
HGB UR QL STRIP.AUTO: NEGATIVE
HYALINE CASTS #/AREA URNS LPF: ABNORMAL /LPF
KETONES UR STRIP-MCNC: NEGATIVE MG/DL
LEUKOCYTE ESTERASE UR QL STRIP: ABNORMAL
MUCOUS THREADS UR QL AUTO: ABNORMAL
NITRITE UR QL STRIP: POSITIVE
NON-SQ EPI CELLS URNS QL MICRO: ABNORMAL /HPF
PH UR STRIP.AUTO: 6 [PH]
PROT UR STRIP-MCNC: ABNORMAL MG/DL
RBC #/AREA URNS AUTO: ABNORMAL /HPF
SL AMB POCT HEMOGLOBIN AIC: 6.3 (ref ?–6.5)
SP GR UR STRIP.AUTO: 1.03 (ref 1–1.03)
UROBILINOGEN UR STRIP-ACNC: 2 MG/DL
WBC #/AREA URNS AUTO: ABNORMAL /HPF

## 2024-01-15 PROCEDURE — 83036 HEMOGLOBIN GLYCOSYLATED A1C: CPT | Performed by: FAMILY MEDICINE

## 2024-01-15 PROCEDURE — 99214 OFFICE O/P EST MOD 30 MIN: CPT | Performed by: FAMILY MEDICINE

## 2024-01-15 PROCEDURE — G0008 ADMIN INFLUENZA VIRUS VAC: HCPCS

## 2024-01-15 PROCEDURE — 81001 URINALYSIS AUTO W/SCOPE: CPT | Performed by: FAMILY MEDICINE

## 2024-01-15 PROCEDURE — 90662 IIV NO PRSV INCREASED AG IM: CPT

## 2024-01-15 RX ORDER — OMEPRAZOLE 20 MG/1
20 CAPSULE, DELAYED RELEASE ORAL DAILY
Qty: 90 CAPSULE | Refills: 3 | Status: SHIPPED | OUTPATIENT
Start: 2024-01-15

## 2024-01-15 RX ORDER — SIMVASTATIN 40 MG
40 TABLET ORAL
Qty: 100 TABLET | Refills: 3 | Status: SHIPPED | OUTPATIENT
Start: 2024-01-15

## 2024-01-15 RX ORDER — LEVETIRACETAM 1000 MG/1
1000 TABLET ORAL 2 TIMES DAILY
Qty: 180 TABLET | Refills: 3 | Status: SHIPPED | OUTPATIENT
Start: 2024-01-15

## 2024-01-15 NOTE — PROGRESS NOTES
Assessment/Plan:    Gastroesophageal reflux disease  No alarm signs. Continue current.    Primary hypertension  BP at goal.  Continue current.    Mixed hyperlipidemia  LDL at goal. Continue current.    Generalized convulsive seizure (HCC)  No recent seizure activity.  Continue current.    Mood disorder (HCC)  Stable.  Continue current.       Diagnoses and all orders for this visit:    Type 2 diabetes mellitus without complication, without long-term current use of insulin (HCC)  -     POCT hemoglobin A1c  -     simvastatin (ZOCOR) 40 mg tablet; Take 1 tablet (40 mg total) by mouth daily at bedtime  -     HEMOGLOBIN A1C W/ EAG ESTIMATION; Future  -     Comprehensive metabolic panel; Future  -     TSH, 3rd generation with Free T4 reflex; Future  -     Lipid panel; Future    Encounter for immunization  -     influenza vaccine, high-dose, PF 0.7 mL (FLUZONE HIGH-DOSE)  -     RSV Pre-Fusion F A&B Vac Rcmb (Abrysvo) SOLR vaccine; Inject 0.5 mL into a muscle once for 1 dose    Encounter for screening mammogram for breast cancer  -     Mammo screening bilateral w 3d & cad; Future    UTI symptoms  -     UA (URINE) with reflex to Scope    Mood disorder (HCC)    Generalized convulsive seizure (HCC)    Malignant neoplasm of right breast in female, estrogen receptor positive, unspecified site of breast     Gastroesophageal reflux disease, unspecified whether esophagitis present    Primary hypertension  -     HEMOGLOBIN A1C W/ EAG ESTIMATION; Future  -     Comprehensive metabolic panel; Future  -     TSH, 3rd generation with Free T4 reflex; Future  -     Lipid panel; Future    Mixed hyperlipidemia  -     HEMOGLOBIN A1C W/ EAG ESTIMATION; Future  -     Comprehensive metabolic panel; Future  -     TSH, 3rd generation with Free T4 reflex; Future  -     Lipid panel; Future    Mixed stress and urge urinary incontinence    Gastroesophageal reflux disease without esophagitis  -     omeprazole (PriLOSEC) 20 mg delayed release capsule;  Take 1 capsule (20 mg total) by mouth daily    Seizure disorder (HCC)  -     levETIRAcetam (KEPPRA) 1000 MG tablet; Take 1 tablet (1,000 mg total) by mouth 2 (two) times a day          Subjective:      Patient ID: Micaela Hough is a 73 y.o. female.    She meets the criteria for T2DM but is not on medications.  She has no polyphagia, polyuria, or polyphagia.     She is on a statin medication.  She has no myalgia or muscle weakness.  She has normal LFTs.    Her BP is at goal on her current regimen.  She has no CP or SOB.  She has no HA or vision changes.    Urinary Tract Infection         The following portions of the patient's history were reviewed and updated as appropriate: She  has a past medical history of Breast cancer ([facility]).  She   Patient Active Problem List    Diagnosis Date Noted    Mixed stress and urge urinary incontinence 01/09/2023    Osteopenia 07/06/2022    Chronic bursitis of right shoulder 01/05/2022    Arthritis of carpometacarpal (CMC) joint of left thumb 01/05/2022    Malignant neoplasm of right breast in female, estrogen receptor positive, unspecified site of breast  07/01/2021    Primary hypertension 01/27/2021    Pre-diabetes 07/08/2020    Generalized convulsive seizure (HCC) 02/19/2020    Mood disorder (HCC) 10/02/2019    Gastroesophageal reflux disease 12/24/2018    Personal history of breast cancer 01/19/2017    Mixed hyperlipidemia 03/04/2014     She  has a past surgical history that includes Hysterectomy and Mastectomy.  Her family history includes Asthma in her son; Cancer in her brother, mother, and sister; Diabetes in her brother, father, mother, and sister; Heart disease in her father and mother; Hypertension in her father, mother, and son; Kidney disease in her daughter.  She  reports that she quit smoking about 3 years ago. Her smoking use included cigarettes. She started smoking about 59 years ago. She has never used smokeless tobacco. She reports that she does not currently use  alcohol. She reports that she does not use drugs.  Current Outpatient Medications   Medication Sig Dispense Refill    acetaminophen (TYLENOL) 500 mg tablet Take 500 mg by mouth      aspirin 81 mg chewable tablet Chew 81 mg      calcium citrate-vitamin D (Calcium Citrate + D3) 315-200 MG-UNIT per tablet Take 2 tablets by mouth 2 (two) times a day 360 tablet 0    cycloSPORINE (Restasis) 0.05 % ophthalmic emulsion       levETIRAcetam (KEPPRA) 1000 MG tablet Take 1 tablet (1,000 mg total) by mouth 2 (two) times a day 180 tablet 3    losartan-hydrochlorothiazide (HYZAAR) 100-25 MG per tablet Take 1 tablet by mouth daily 90 tablet 3    omeprazole (PriLOSEC) 20 mg delayed release capsule Take 1 capsule (20 mg total) by mouth daily 90 capsule 3    RSV Pre-Fusion F A&B Vac Rcmb (Abrysvo) SOLR vaccine Inject 0.5 mL into a muscle once for 1 dose 1 each 0    simvastatin (ZOCOR) 40 mg tablet Take 1 tablet (40 mg total) by mouth daily at bedtime 100 tablet 3    venlafaxine (EFFEXOR-XR) 75 mg 24 hr capsule Take 1 capsule (75 mg total) by mouth daily 90 capsule 3    carbamide peroxide (DEBROX) 6.5 % otic solution Administer 5 drops into the left ear 2 (two) times a day for 4 days 15 mL 0    Cholecalciferol 50 MCG (2000 UT) TABS Take 1 tablet (2,000 Units total) by mouth every morning (Patient not taking: Reported on 1/15/2024) 90 tablet 3     No current facility-administered medications for this visit.     Current Outpatient Medications on File Prior to Visit   Medication Sig    acetaminophen (TYLENOL) 500 mg tablet Take 500 mg by mouth    aspirin 81 mg chewable tablet Chew 81 mg    calcium citrate-vitamin D (Calcium Citrate + D3) 315-200 MG-UNIT per tablet Take 2 tablets by mouth 2 (two) times a day    cycloSPORINE (Restasis) 0.05 % ophthalmic emulsion     losartan-hydrochlorothiazide (HYZAAR) 100-25 MG per tablet Take 1 tablet by mouth daily    venlafaxine (EFFEXOR-XR) 75 mg 24 hr capsule Take 1 capsule (75 mg total) by mouth  "daily    [DISCONTINUED] levETIRAcetam (KEPPRA) 1000 MG tablet Take 1 tablet (1,000 mg total) by mouth 2 (two) times a day    [DISCONTINUED] omeprazole (PriLOSEC) 20 mg delayed release capsule take 1 capsule by mouth once daily    [DISCONTINUED] simvastatin (ZOCOR) 40 mg tablet Take 1 tablet (40 mg total) by mouth daily at bedtime    carbamide peroxide (DEBROX) 6.5 % otic solution Administer 5 drops into the left ear 2 (two) times a day for 4 days    Cholecalciferol 50 MCG (2000 UT) TABS Take 1 tablet (2,000 Units total) by mouth every morning (Patient not taking: Reported on 1/15/2024)     No current facility-administered medications on file prior to visit.     She is allergic to pollen extract..    Review of Systems   All other systems reviewed and are negative.        Objective:      /70 (BP Location: Left arm, Patient Position: Sitting)   Pulse 56   Temp (!) 96.1 °F (35.6 °C) (Tympanic)   Ht 5' 6\" (1.676 m)   Wt 90.1 kg (198 lb 9.6 oz)   SpO2 98%   BMI 32.05 kg/m²          Physical Exam  Vitals and nursing note reviewed.   Constitutional:       Appearance: Normal appearance. She is obese.   Cardiovascular:      Rate and Rhythm: Normal rate and regular rhythm.      Pulses: Normal pulses.      Heart sounds: Normal heart sounds.   Pulmonary:      Effort: Pulmonary effort is normal.      Breath sounds: Normal breath sounds.   Abdominal:      General: Abdomen is flat. Bowel sounds are normal.      Palpations: Abdomen is soft.   Musculoskeletal:         General: Normal range of motion.      Cervical back: Normal range of motion and neck supple.   Skin:     General: Skin is warm and dry.      Capillary Refill: Capillary refill takes less than 2 seconds.   Neurological:      General: No focal deficit present.      Mental Status: She is alert and oriented to person, place, and time. Mental status is at baseline.   Psychiatric:         Mood and Affect: Mood normal.         Behavior: Behavior normal.         " Thought Content: Thought content normal.         Judgment: Judgment normal.

## 2024-01-16 DIAGNOSIS — R80.9 PROTEINURIA, UNSPECIFIED TYPE: Primary | ICD-10-CM

## 2024-01-16 NOTE — RESULT ENCOUNTER NOTE
You have some protein in your urine.  That could be the issue of the strong urine.  I would like to do a 24 hour urine collection for protein.

## 2024-01-22 ENCOUNTER — APPOINTMENT (OUTPATIENT)
Dept: LAB | Facility: CLINIC | Age: 74
End: 2024-01-22
Payer: COMMERCIAL

## 2024-01-22 DIAGNOSIS — R80.9 PROTEINURIA, UNSPECIFIED TYPE: ICD-10-CM

## 2024-01-22 PROCEDURE — 84156 ASSAY OF PROTEIN URINE: CPT

## 2024-01-23 LAB
PROT 24H UR-MCNC: 101 MG/24 HRS
SPECIMEN VOL UR: 1000 ML

## 2024-01-25 ENCOUNTER — TELEPHONE (OUTPATIENT)
Age: 74
End: 2024-01-25

## 2024-01-25 NOTE — TELEPHONE ENCOUNTER
Pt contacted the office requesting the provider review her urine results. Pt states she dropped off a urine sample on 1/22/2024 and hasn't heard anything back. Please review and contact pt w/ results

## 2024-02-23 ENCOUNTER — DOCTOR'S OFFICE (OUTPATIENT)
Dept: URBAN - NONMETROPOLITAN AREA CLINIC 1 | Facility: CLINIC | Age: 74
Setting detail: OPHTHALMOLOGY
End: 2024-02-23
Payer: MEDICARE

## 2024-02-23 DIAGNOSIS — H35.363: ICD-10-CM

## 2024-02-23 DIAGNOSIS — H04.123: ICD-10-CM

## 2024-02-23 DIAGNOSIS — H02.831: ICD-10-CM

## 2024-02-23 DIAGNOSIS — H02.834: ICD-10-CM

## 2024-02-23 PROBLEM — H02.403 PTOSIS; BOTH EYES: Status: ACTIVE | Noted: 2024-02-23

## 2024-02-23 PROCEDURE — 92014 COMPRE OPH EXAM EST PT 1/>: CPT | Performed by: OPHTHALMOLOGY

## 2024-02-23 PROCEDURE — 92134 CPTRZ OPH DX IMG PST SGM RTA: CPT | Performed by: OPHTHALMOLOGY

## 2024-02-23 ASSESSMENT — CONFRONTATIONAL VISUAL FIELD TEST (CVF)
OS_FINDINGS: FULL
OD_FINDINGS: FULL

## 2024-02-23 ASSESSMENT — LID EXAM ASSESSMENTS
OD_BLEPHARITIS: ABSENT
OS_BLEPHARITIS: ABSENT

## 2024-02-23 ASSESSMENT — LID POSITION - DERMATOCHALASIS
OS_DERMATOCHALASIS: LUL 2+
OD_DERMATOCHALASIS: RUL 2+

## 2024-02-23 ASSESSMENT — LID POSITION - PTOSIS
OS_PTOSIS: LUL 2+
OD_PTOSIS: RUL 2+

## 2024-04-19 ENCOUNTER — DOCTOR'S OFFICE (OUTPATIENT)
Dept: URBAN - NONMETROPOLITAN AREA CLINIC 1 | Facility: CLINIC | Age: 74
Setting detail: OPHTHALMOLOGY
End: 2024-04-19
Payer: MEDICARE

## 2024-04-19 DIAGNOSIS — H02.831: ICD-10-CM

## 2024-04-19 DIAGNOSIS — H02.834: ICD-10-CM

## 2024-04-19 DIAGNOSIS — H02.403: ICD-10-CM

## 2024-04-19 PROCEDURE — 99213 OFFICE O/P EST LOW 20 MIN: CPT | Performed by: OPHTHALMOLOGY

## 2024-04-19 PROCEDURE — 92083 EXTENDED VISUAL FIELD XM: CPT | Performed by: OPHTHALMOLOGY

## 2024-04-19 ASSESSMENT — LID POSITION - PTOSIS
OD_PTOSIS: RUL T
OS_PTOSIS: LUL T

## 2024-04-19 ASSESSMENT — LID POSITION - DERMATOCHALASIS
OD_DERMATOCHALASIS: RUL 2+ 3+
OS_DERMATOCHALASIS: LUL 2+ 3+

## 2024-04-19 ASSESSMENT — LID EXAM ASSESSMENTS
OS_BLEPHARITIS: ABSENT
OD_BLEPHARITIS: ABSENT

## 2024-04-22 ENCOUNTER — APPOINTMENT (OUTPATIENT)
Dept: LAB | Facility: CLINIC | Age: 74
End: 2024-04-22
Payer: COMMERCIAL

## 2024-04-22 DIAGNOSIS — C50.811 MALIGNANT NEOPLASM OF OVERLAPPING SITES OF RIGHT FEMALE BREAST, UNSPECIFIED ESTROGEN RECEPTOR STATUS (HCC): ICD-10-CM

## 2024-04-22 DIAGNOSIS — E74.09 1,4,ALPHA-GLUCAN 6-ALPHA-GLUCOSYLTRANSFERASE DEFICIENCY (HCC): ICD-10-CM

## 2024-04-22 DIAGNOSIS — Z17.0 ESTROGEN RECEPTOR POSITIVE: ICD-10-CM

## 2024-04-22 PROCEDURE — 86300 IMMUNOASSAY TUMOR CA 15-3: CPT

## 2024-04-22 PROCEDURE — 36415 COLL VENOUS BLD VENIPUNCTURE: CPT

## 2024-04-23 LAB — CANCER AG27-29 SERPL-ACNC: 22.5 U/ML (ref 0–38.6)

## 2024-05-21 ENCOUNTER — TELEPHONE (OUTPATIENT)
Age: 74
End: 2024-05-21

## 2024-05-21 NOTE — TELEPHONE ENCOUNTER
Patient called hoping to move up her July 17th appointment with Dr Hwang; she reported both legs, thighs and  her stomach muscles hurt when going up steps; gets out of breathe; both legs felt warm; sometimes stomach muscles hurt when eating. I advised I couldn't find any earlier appointments with Dr Hwang and the soonest available appointment would be 5/31/24 w/Adan. She asked to be put on wait list for appointment on 7/15/24.

## 2024-06-14 ENCOUNTER — LAB (OUTPATIENT)
Dept: LAB | Facility: CLINIC | Age: 74
End: 2024-06-14
Payer: COMMERCIAL

## 2024-06-14 DIAGNOSIS — I10 PRIMARY HYPERTENSION: ICD-10-CM

## 2024-06-14 DIAGNOSIS — Z17.0 ESTROGEN RECEPTOR POSITIVE: ICD-10-CM

## 2024-06-14 DIAGNOSIS — E74.09 1,4,ALPHA-GLUCAN 6-ALPHA-GLUCOSYLTRANSFERASE DEFICIENCY (HCC): ICD-10-CM

## 2024-06-14 DIAGNOSIS — E78.2 MIXED HYPERLIPIDEMIA: ICD-10-CM

## 2024-06-14 DIAGNOSIS — C50.811 MALIGNANT NEOPLASM OF OVERLAPPING SITES OF RIGHT FEMALE BREAST, UNSPECIFIED ESTROGEN RECEPTOR STATUS (HCC): ICD-10-CM

## 2024-06-14 DIAGNOSIS — E11.9 TYPE 2 DIABETES MELLITUS WITHOUT COMPLICATION, WITHOUT LONG-TERM CURRENT USE OF INSULIN (HCC): ICD-10-CM

## 2024-06-14 LAB
ALBUMIN SERPL BCP-MCNC: 4.2 G/DL (ref 3.5–5)
ALP SERPL-CCNC: 67 U/L (ref 34–104)
ALT SERPL W P-5'-P-CCNC: 14 U/L (ref 7–52)
ANION GAP SERPL CALCULATED.3IONS-SCNC: 10 MMOL/L (ref 4–13)
AST SERPL W P-5'-P-CCNC: 15 U/L (ref 13–39)
BASOPHILS # BLD AUTO: 0.05 THOUSANDS/ÂΜL (ref 0–0.1)
BASOPHILS NFR BLD AUTO: 1 % (ref 0–1)
BILIRUB SERPL-MCNC: 0.34 MG/DL (ref 0.2–1)
BUN SERPL-MCNC: 26 MG/DL (ref 5–25)
CALCIUM SERPL-MCNC: 9.4 MG/DL (ref 8.4–10.2)
CHLORIDE SERPL-SCNC: 106 MMOL/L (ref 96–108)
CHOLEST SERPL-MCNC: 211 MG/DL
CO2 SERPL-SCNC: 27 MMOL/L (ref 21–32)
CREAT SERPL-MCNC: 0.71 MG/DL (ref 0.6–1.3)
EOSINOPHIL # BLD AUTO: 0.23 THOUSAND/ÂΜL (ref 0–0.61)
EOSINOPHIL NFR BLD AUTO: 5 % (ref 0–6)
ERYTHROCYTE [DISTWIDTH] IN BLOOD BY AUTOMATED COUNT: 13.2 % (ref 11.6–15.1)
EST. AVERAGE GLUCOSE BLD GHB EST-MCNC: 146 MG/DL
GFR SERPL CREATININE-BSD FRML MDRD: 83 ML/MIN/1.73SQ M
GLUCOSE P FAST SERPL-MCNC: 112 MG/DL (ref 65–99)
HBA1C MFR BLD: 6.7 %
HCT VFR BLD AUTO: 41.1 % (ref 34.8–46.1)
HDLC SERPL-MCNC: 74 MG/DL
HGB BLD-MCNC: 13.1 G/DL (ref 11.5–15.4)
IMM GRANULOCYTES # BLD AUTO: 0.03 THOUSAND/UL (ref 0–0.2)
IMM GRANULOCYTES NFR BLD AUTO: 1 % (ref 0–2)
LDLC SERPL CALC-MCNC: 108 MG/DL (ref 0–100)
LYMPHOCYTES # BLD AUTO: 1.52 THOUSANDS/ÂΜL (ref 0.6–4.47)
LYMPHOCYTES NFR BLD AUTO: 31 % (ref 14–44)
MCH RBC QN AUTO: 28.7 PG (ref 26.8–34.3)
MCHC RBC AUTO-ENTMCNC: 31.9 G/DL (ref 31.4–37.4)
MCV RBC AUTO: 90 FL (ref 82–98)
MONOCYTES # BLD AUTO: 0.4 THOUSAND/ÂΜL (ref 0.17–1.22)
MONOCYTES NFR BLD AUTO: 8 % (ref 4–12)
NEUTROPHILS # BLD AUTO: 2.65 THOUSANDS/ÂΜL (ref 1.85–7.62)
NEUTS SEG NFR BLD AUTO: 54 % (ref 43–75)
NONHDLC SERPL-MCNC: 137 MG/DL
NRBC BLD AUTO-RTO: 0 /100 WBCS
PLATELET # BLD AUTO: 198 THOUSANDS/UL (ref 149–390)
PMV BLD AUTO: 10.4 FL (ref 8.9–12.7)
POTASSIUM SERPL-SCNC: 4.1 MMOL/L (ref 3.5–5.3)
PROT SERPL-MCNC: 6.8 G/DL (ref 6.4–8.4)
RBC # BLD AUTO: 4.57 MILLION/UL (ref 3.81–5.12)
SODIUM SERPL-SCNC: 143 MMOL/L (ref 135–147)
TRIGL SERPL-MCNC: 144 MG/DL
TSH SERPL DL<=0.05 MIU/L-ACNC: 1.52 UIU/ML (ref 0.45–4.5)
WBC # BLD AUTO: 4.88 THOUSAND/UL (ref 4.31–10.16)

## 2024-06-14 PROCEDURE — 84443 ASSAY THYROID STIM HORMONE: CPT

## 2024-06-14 PROCEDURE — 36415 COLL VENOUS BLD VENIPUNCTURE: CPT

## 2024-06-14 PROCEDURE — 85025 COMPLETE CBC W/AUTO DIFF WBC: CPT

## 2024-06-14 PROCEDURE — 80061 LIPID PANEL: CPT

## 2024-06-14 PROCEDURE — 83036 HEMOGLOBIN GLYCOSYLATED A1C: CPT

## 2024-06-14 PROCEDURE — 80053 COMPREHEN METABOLIC PANEL: CPT

## 2024-06-16 NOTE — RESULT ENCOUNTER NOTE
A1c is 6.7 Called [facility], was transferred to Nursing on 3rd floor, gave provider message below, they looked in the chart and found the script and will start to give to patient today    Anne Rossi RN

## 2024-06-17 ENCOUNTER — TELEPHONE (OUTPATIENT)
Dept: FAMILY MEDICINE CLINIC | Facility: CLINIC | Age: 74
End: 2024-06-17

## 2024-06-17 NOTE — TELEPHONE ENCOUNTER
----- Message from Remington Hwang MD sent at 6/17/2024  8:34 AM EDT -----  Follow up as scheduled.  ----- Message -----  From: Zoe Garvey  Sent: 6/17/2024   8:05 AM EDT  To: Remington Hwang MD    What are the recommendations for this patient?  ----- Message -----  From: Remington Hwang MD  Sent: 6/16/2024  11:56 AM EDT  To: [facility]    A1c is 6.7

## 2024-06-30 DIAGNOSIS — I10 PRIMARY HYPERTENSION: ICD-10-CM

## 2024-06-30 DIAGNOSIS — F39 MOOD DISORDER (HCC): ICD-10-CM

## 2024-06-30 RX ORDER — VENLAFAXINE HYDROCHLORIDE 75 MG/1
75 CAPSULE, EXTENDED RELEASE ORAL DAILY
Qty: 90 CAPSULE | Refills: 1 | Status: SHIPPED | OUTPATIENT
Start: 2024-06-30

## 2024-06-30 RX ORDER — LOSARTAN POTASSIUM AND HYDROCHLOROTHIAZIDE 25; 100 MG/1; MG/1
1 TABLET ORAL DAILY
Qty: 90 TABLET | Refills: 1 | Status: SHIPPED | OUTPATIENT
Start: 2024-06-30

## 2024-07-15 ENCOUNTER — OFFICE VISIT (OUTPATIENT)
Dept: FAMILY MEDICINE CLINIC | Facility: CLINIC | Age: 74
End: 2024-07-15
Payer: COMMERCIAL

## 2024-07-15 VITALS
WEIGHT: 198 LBS | TEMPERATURE: 97.6 F | OXYGEN SATURATION: 98 % | HEART RATE: 58 BPM | SYSTOLIC BLOOD PRESSURE: 126 MMHG | BODY MASS INDEX: 31.82 KG/M2 | HEIGHT: 66 IN | DIASTOLIC BLOOD PRESSURE: 78 MMHG

## 2024-07-15 DIAGNOSIS — R10.11 RUQ PAIN: ICD-10-CM

## 2024-07-15 DIAGNOSIS — R06.02 SOB (SHORTNESS OF BREATH): Primary | ICD-10-CM

## 2024-07-15 DIAGNOSIS — G47.33 OSA (OBSTRUCTIVE SLEEP APNEA): ICD-10-CM

## 2024-07-15 DIAGNOSIS — E11.9 TYPE 2 DIABETES MELLITUS WITHOUT COMPLICATION, WITHOUT LONG-TERM CURRENT USE OF INSULIN (HCC): ICD-10-CM

## 2024-07-15 DIAGNOSIS — I73.9 CLAUDICATION (HCC): ICD-10-CM

## 2024-07-15 DIAGNOSIS — M79.671 RIGHT FOOT PAIN: ICD-10-CM

## 2024-07-15 PROCEDURE — G2211 COMPLEX E/M VISIT ADD ON: HCPCS | Performed by: FAMILY MEDICINE

## 2024-07-15 PROCEDURE — 99214 OFFICE O/P EST MOD 30 MIN: CPT | Performed by: FAMILY MEDICINE

## 2024-07-15 NOTE — PROGRESS NOTES
"Ambulatory Visit  Name: Micaela Hough      : 1950      MRN: [id number]  Encounter Provider: Remington Hwang MD  Encounter Date: 7/15/2024   Encounter department: [facility]    Assessment & Plan   1. SOB (shortness of breath)  -     Echo complete w/ contrast if indicated; Future; Expected date: 07/15/2024  2. SHANICE (obstructive sleep apnea)  -     Ambulatory Referral to Sleep Medicine; Future  3. Type 2 diabetes mellitus without complication, without long-term current use of insulin (HCC)  -     Microalbumin, urine, 24 hour; Future  4. Claudication (HCC)  -     VAS ARTERIAL DUPLEX- LOWER LIMB BILATERAL; Future; Expected date: 07/15/2024  5. Right foot pain  -     XR foot 2 vw right; Future; Expected date: 07/15/2024  -     Diclofenac Sodium (VOLTAREN) 1 %; Apply 4 g topically 4 (four) times a day  6. RUQ pain  -     US right upper quadrant; Future; Expected date: 07/15/2024       History of Present Illness     She c/o fatigue x months.  She has a h/o depression, but she does not feel this is mood related.  She had normal CBC, CMP, TSH about a month ago.  She has no new medications.  She has no recent seizure activity.    She also states that her legs get tired when she walks any distance.  She SOB with a flight of stairs.  She had a fairly normal TTE about 3 years ago.    Fatigue  Associated symptoms include fatigue and nausea.   Nausea  Associated symptoms include fatigue and nausea.       Review of Systems   Constitutional:  Positive for fatigue.   Gastrointestinal:  Positive for nausea.       Objective     /78 (BP Location: Left arm, Patient Position: Sitting)   Pulse 58   Temp 97.6 °F (36.4 °C) (Tympanic)   Ht 5' 6\" (1.676 m)   Wt 89.8 kg (198 lb)   SpO2 98%   BMI 31.96 kg/m²     Physical Exam  Vitals and nursing note reviewed.   Constitutional:       Appearance: Normal appearance. She is obese.   Cardiovascular:      Rate and Rhythm: Normal rate and regular rhythm.    "   Pulses: Normal pulses.      Heart sounds: Normal heart sounds.   Pulmonary:      Effort: Pulmonary effort is normal.      Breath sounds: Normal breath sounds.   Abdominal:      General: Abdomen is flat. Bowel sounds are normal.      Palpations: Abdomen is soft.   Musculoskeletal:         General: Normal range of motion.      Cervical back: Normal range of motion and neck supple.   Skin:     General: Skin is warm and dry.      Capillary Refill: Capillary refill takes less than 2 seconds.   Neurological:      General: No focal deficit present.      Mental Status: She is alert and oriented to person, place, and time. Mental status is at baseline.   Psychiatric:         Mood and Affect: Mood normal.         Behavior: Behavior normal.         Thought Content: Thought content normal.         Judgment: Judgment normal.       Administrative Statements

## 2024-07-16 DIAGNOSIS — G47.33 OSA (OBSTRUCTIVE SLEEP APNEA): Primary | ICD-10-CM

## 2024-07-31 ENCOUNTER — HOSPITAL ENCOUNTER (OUTPATIENT)
Dept: ULTRASOUND IMAGING | Facility: HOSPITAL | Age: 74
Discharge: HOME | End: 2024-07-31
Payer: COMMERCIAL

## 2024-07-31 ENCOUNTER — HOSPITAL ENCOUNTER (OUTPATIENT)
Dept: NON INVASIVE DIAGNOSTICS | Facility: HOSPITAL | Age: 74
Discharge: HOME | End: 2024-07-31
Payer: COMMERCIAL

## 2024-07-31 VITALS
HEART RATE: 58 BPM | DIASTOLIC BLOOD PRESSURE: 78 MMHG | HEIGHT: 66 IN | WEIGHT: 198 LBS | SYSTOLIC BLOOD PRESSURE: 126 MMHG | BODY MASS INDEX: 31.82 KG/M2

## 2024-07-31 DIAGNOSIS — R10.11 RUQ PAIN: ICD-10-CM

## 2024-07-31 DIAGNOSIS — R06.02 SOB (SHORTNESS OF BREATH): ICD-10-CM

## 2024-07-31 LAB
AORTIC ROOT: 3.2 CM
APICAL FOUR CHAMBER EJECTION FRACTION: 57 %
BSA FOR ECHO PROCEDURE: 1.99 M2
E WAVE DECELERATION TIME: 323 MS
E/A RATIO: 0.83
FRACTIONAL SHORTENING: 39 (ref 28–44)
INTERVENTRICULAR SEPTUM IN DIASTOLE (PARASTERNAL SHORT AXIS VIEW): 1.4 CM
INTERVENTRICULAR SEPTUM: 1.4 CM (ref 0.6–1.1)
LAAS-AP2: 14.2 CM2
LAAS-AP4: 13 CM2
LEFT ATRIUM SIZE: 3.2 CM
LEFT ATRIUM VOLUME (MOD BIPLANE): 33 ML
LEFT ATRIUM VOLUME INDEX (MOD BIPLANE): 16.6 ML/M2
LEFT INTERNAL DIMENSION IN SYSTOLE: 2.8 CM (ref 2.1–4)
LEFT VENTRICULAR INTERNAL DIMENSION IN DIASTOLE: 4.6 CM (ref 3.5–6)
LEFT VENTRICULAR POSTERIOR WALL IN END DIASTOLE: 1.3 CM
LEFT VENTRICULAR STROKE VOLUME: 67 ML
LVSV (TEICH): 67 ML
MV E'TISSUE VEL-SEP: 8 CM/S
MV PEAK A VEL: 0.78 M/S
MV PEAK E VEL: 65 CM/S
MV STENOSIS PRESSURE HALF TIME: 94 MS
MV VALVE AREA P 1/2 METHOD: 2.34
RIGHT ATRIUM AREA SYSTOLE A4C: 12.5 CM2
RIGHT VENTRICLE ID DIMENSION: 3 CM
SL CV LEFT ATRIUM LENGTH A2C: 4.3 CM
SL CV PED ECHO LEFT VENTRICLE DIASTOLIC VOLUME (MOD BIPLANE) 2D: 97 ML
SL CV PED ECHO LEFT VENTRICLE SYSTOLIC VOLUME (MOD BIPLANE) 2D: 30 ML
TR MAX PG: 18 MMHG
TR PEAK VELOCITY: 2.1 M/S
TRICUSPID ANNULAR PLANE SYSTOLIC EXCURSION: 2.1 CM
TRICUSPID VALVE PEAK REGURGITATION VELOCITY: 2.12 M/S

## 2024-07-31 PROCEDURE — 93306 TTE W/DOPPLER COMPLETE: CPT

## 2024-07-31 PROCEDURE — 93306 TTE W/DOPPLER COMPLETE: CPT | Performed by: INTERNAL MEDICINE

## 2024-07-31 PROCEDURE — 76705 ECHO EXAM OF ABDOMEN: CPT

## 2024-08-01 ENCOUNTER — TELEPHONE (OUTPATIENT)
Dept: FAMILY MEDICINE CLINIC | Facility: CLINIC | Age: 74
End: 2024-08-01

## 2024-08-01 NOTE — TELEPHONE ENCOUNTER
----- Message from Remington Hwang MD sent at 8/1/2024  2:24 PM EDT -----  Echocardiogram is normal.

## 2024-08-05 NOTE — TELEPHONE ENCOUNTER
A. Relayed results to (patient/patient representative as listed on communication consent form) as per provider message. Patient/Patient Representative expressed understanding and did not have any further questions.

## 2024-09-03 ENCOUNTER — OFFICE VISIT (OUTPATIENT)
Dept: FAMILY MEDICINE CLINIC | Facility: CLINIC | Age: 74
End: 2024-09-03
Payer: COMMERCIAL

## 2024-09-03 VITALS
OXYGEN SATURATION: 96 % | BODY MASS INDEX: 31.12 KG/M2 | HEART RATE: 70 BPM | TEMPERATURE: 98.2 F | HEIGHT: 66 IN | DIASTOLIC BLOOD PRESSURE: 80 MMHG | SYSTOLIC BLOOD PRESSURE: 128 MMHG | WEIGHT: 193.6 LBS

## 2024-09-03 DIAGNOSIS — R10.84 GENERALIZED ABDOMINAL PAIN: Primary | ICD-10-CM

## 2024-09-03 DIAGNOSIS — R19.7 DIARRHEA, UNSPECIFIED TYPE: ICD-10-CM

## 2024-09-03 PROCEDURE — 99213 OFFICE O/P EST LOW 20 MIN: CPT

## 2024-09-03 PROCEDURE — G2211 COMPLEX E/M VISIT ADD ON: HCPCS

## 2024-09-03 PROCEDURE — 1160F RVW MEDS BY RX/DR IN RCRD: CPT

## 2024-09-03 PROCEDURE — 1159F MED LIST DOCD IN RCRD: CPT

## 2024-09-03 RX ORDER — LOPERAMIDE HYDROCHLORIDE 2 MG/1
2 TABLET ORAL 4 TIMES DAILY PRN
Qty: 30 TABLET | Refills: 0 | Status: CANCELLED | OUTPATIENT
Start: 2024-09-03

## 2024-09-03 NOTE — PROGRESS NOTES
Ambulatory Visit  Name: Micaela Hough      : 1950      MRN: [id number]  Encounter Provider: Adan Calvo PA-C  Encounter Date: 9/3/2024   Encounter department: [facility]    Assessment & Plan   1. Generalized abdominal pain  -     Comprehensive metabolic panel; Future  -     CBC and differential; Future  -     Lipase; Future  -     CT abdomen pelvis w wo contrast; Future; Expected date: 2024  -     Urinalysis with microscopic; Future  2. Diarrhea, unspecified type  -     Comprehensive metabolic panel; Future  -     CBC and differential; Future  -     Lipase; Future  -     CT abdomen pelvis w wo contrast; Future; Expected date: 2024  Told patient to go get lab work as soon as possible and put in a stat CT.  Patient educated on signs and symptoms of worsening, and is to go to the ER if these present.  Does have Imodium at home to use for diarrhea symptoms.  Educated on side effects and max dosing.  Will follow-up pending results of lab work and imaging.     History of Present Illness     Patient is a 74-year-old female presenting for abdominal pain for 3 weeks.    Abdominal Pain  This is a new problem. The current episode started 1 to 4 weeks ago (3 weeks). The onset quality is sudden. The problem occurs constantly. The problem has been waxing and waning. The pain is located in the generalized abdominal region (Worse in top and more consistently in top, occasionally bottom). The pain is at a severity of 8/10. The pain is severe. The quality of the pain is sharp and aching. The abdominal pain does not radiate. Associated symptoms include anorexia, diarrhea and nausea. Pertinent negatives include no arthralgias, belching, constipation, dysuria, fever, flatus, frequency, headaches, hematochezia, hematuria, melena, myalgias, vomiting or weight loss. The pain is aggravated by eating. The pain is relieved by Nothing. She has tried nothing for the symptoms. There is no  history of abdominal surgery, colon cancer, Crohn's disease, gallstones, GERD, irritable bowel syndrome, pancreatitis, PUD or ulcerative colitis.       Review of Systems   Constitutional:  Negative for appetite change, chills, diaphoresis, fatigue, fever and weight loss.   HENT:  Negative for congestion, ear discharge, ear pain, postnasal drip, rhinorrhea, sinus pressure, sinus pain, sneezing and sore throat.    Eyes:  Negative for pain, discharge, redness, itching and visual disturbance.   Respiratory:  Negative for apnea, cough, chest tightness, shortness of breath and wheezing.    Cardiovascular:  Negative for chest pain, palpitations and leg swelling.   Gastrointestinal:  Positive for abdominal pain, anorexia, diarrhea and nausea. Negative for blood in stool, constipation, flatus, hematochezia, melena and vomiting.   Endocrine: Negative for cold intolerance, heat intolerance, polydipsia and polyuria.   Genitourinary:  Negative for dysuria, flank pain, frequency, hematuria and urgency.   Musculoskeletal:  Negative for arthralgias, back pain, myalgias, neck pain and neck stiffness.   Skin:  Negative for color change and rash.   Allergic/Immunologic: Negative for environmental allergies and food allergies.   Neurological:  Negative for dizziness, tremors, seizures, syncope, speech difficulty, weakness, light-headedness, numbness and headaches.   Hematological:  Negative for adenopathy. Does not bruise/bleed easily.   Psychiatric/Behavioral:  Negative for agitation, confusion, decreased concentration, dysphoric mood, hallucinations, self-injury, sleep disturbance and suicidal ideas. The patient is not nervous/anxious and is not hyperactive.    All other systems reviewed and are negative.    Medical History Reviewed by provider this encounter:       Current Outpatient Medications on File Prior to Visit   Medication Sig Dispense Refill    acetaminophen (TYLENOL) 500 mg tablet Take 500 mg by mouth      aspirin 81 mg  "chewable tablet Chew 81 mg      calcium citrate-vitamin D (Calcium Citrate + D3) 315-200 MG-UNIT per tablet Take 2 tablets by mouth 2 (two) times a day 360 tablet 0    carbamide peroxide (DEBROX) 6.5 % otic solution Administer 5 drops into the left ear 2 (two) times a day for 4 days 15 mL 0    cycloSPORINE (Restasis) 0.05 % ophthalmic emulsion       levETIRAcetam (KEPPRA) 1000 MG tablet Take 1 tablet (1,000 mg total) by mouth 2 (two) times a day 180 tablet 3    losartan-hydrochlorothiazide (HYZAAR) 100-25 MG per tablet take 1 tablet by mouth once daily 90 tablet 1    omeprazole (PriLOSEC) 20 mg delayed release capsule Take 1 capsule (20 mg total) by mouth daily 90 capsule 3    simvastatin (ZOCOR) 40 mg tablet Take 1 tablet (40 mg total) by mouth daily at bedtime 100 tablet 3    venlafaxine (EFFEXOR-XR) 75 mg 24 hr capsule take 1 capsule by mouth once daily 90 capsule 1    Cholecalciferol 50 MCG (2000 UT) TABS Take 1 tablet (2,000 Units total) by mouth every morning (Patient not taking: Reported on 1/15/2024) 90 tablet 3    Diclofenac Sodium (VOLTAREN) 1 % Apply 4 g topically 4 (four) times a day (Patient not taking: Reported on 9/3/2024) 500 g 5     No current facility-administered medications on file prior to visit.      Social History     Tobacco Use    Smoking status: Former     Current packs/day: 0.00     Types: Cigarettes     Start date: 7/1/1964     Quit date: 1/1/2021     Years since quitting: 3.6    Smokeless tobacco: Never   Vaping Use    Vaping status: Never Used   Substance and Sexual Activity    Alcohol use: Not Currently    Drug use: Never    Sexual activity: Not Currently     Objective     /80 (BP Location: Left arm, Patient Position: Sitting)   Pulse 70   Temp 98.2 °F (36.8 °C) (Tympanic)   Ht 5' 6\" (1.676 m)   Wt 87.8 kg (193 lb 9.6 oz)   SpO2 96%   BMI 31.25 kg/m²     Physical Exam  Vitals and nursing note reviewed.   Constitutional:       General: She is not in acute distress.     " Appearance: Normal appearance. She is well-developed and normal weight. She is not ill-appearing, toxic-appearing or diaphoretic.   HENT:      Head: Normocephalic and atraumatic.      Right Ear: Tympanic membrane normal.      Left Ear: Tympanic membrane normal.      Nose: Nose normal.      Mouth/Throat:      Mouth: Mucous membranes are moist.      Pharynx: Oropharynx is clear.   Eyes:      Conjunctiva/sclera: Conjunctivae normal.      Pupils: Pupils are equal, round, and reactive to light.   Cardiovascular:      Rate and Rhythm: Normal rate and regular rhythm.      Pulses: Normal pulses.      Heart sounds: Normal heart sounds. No murmur heard.  Pulmonary:      Effort: Pulmonary effort is normal. No respiratory distress.      Breath sounds: Normal breath sounds. No wheezing.   Chest:      Chest wall: No tenderness.   Abdominal:      General: Bowel sounds are normal. There is no distension.      Palpations: Abdomen is soft. There is no mass.      Tenderness: There is abdominal tenderness (Mild on palpation of RUQ and LUQ.). There is no right CVA tenderness, left CVA tenderness, guarding or rebound.      Hernia: No hernia is present.   Musculoskeletal:         General: No swelling or tenderness.      Cervical back: Normal range of motion and neck supple. No tenderness.      Right lower leg: No edema.      Left lower leg: No edema.   Lymphadenopathy:      Cervical: No cervical adenopathy.   Skin:     General: Skin is warm and dry.      Capillary Refill: Capillary refill takes less than 2 seconds.      Findings: No erythema, lesion or rash.   Neurological:      General: No focal deficit present.      Mental Status: She is alert and oriented to person, place, and time. Mental status is at baseline.      Motor: No weakness.      Coordination: Coordination normal.      Gait: Gait normal.   Psychiatric:         Mood and Affect: Mood normal.         Behavior: Behavior normal.         Thought Content: Thought content normal.          Judgment: Judgment normal.       Administrative Statements

## 2024-09-04 ENCOUNTER — APPOINTMENT (OUTPATIENT)
Dept: LAB | Facility: HOSPITAL | Age: 74
End: 2024-09-04
Payer: COMMERCIAL

## 2024-09-04 ENCOUNTER — TELEPHONE (OUTPATIENT)
Dept: FAMILY MEDICINE CLINIC | Facility: CLINIC | Age: 74
End: 2024-09-04

## 2024-09-04 ENCOUNTER — HOSPITAL ENCOUNTER (OUTPATIENT)
Dept: CT IMAGING | Facility: HOSPITAL | Age: 74
Discharge: HOME | End: 2024-09-04
Payer: COMMERCIAL

## 2024-09-04 ENCOUNTER — HOSPITAL ENCOUNTER (EMERGENCY)
Facility: HOSPITAL | Age: 74
Discharge: HOME | End: 2024-09-04
Attending: EMERGENCY MEDICINE
Payer: COMMERCIAL

## 2024-09-04 VITALS
SYSTOLIC BLOOD PRESSURE: 168 MMHG | BODY MASS INDEX: 31.64 KG/M2 | WEIGHT: 196.87 LBS | OXYGEN SATURATION: 98 % | HEART RATE: 62 BPM | DIASTOLIC BLOOD PRESSURE: 71 MMHG | RESPIRATION RATE: 18 BRPM | TEMPERATURE: 98.8 F | HEIGHT: 66 IN

## 2024-09-04 DIAGNOSIS — N39.0 UTI (URINARY TRACT INFECTION): ICD-10-CM

## 2024-09-04 DIAGNOSIS — R10.84 GENERALIZED ABDOMINAL PAIN: ICD-10-CM

## 2024-09-04 DIAGNOSIS — R93.7 ABNORMAL CT SCAN, LUMBAR SPINE: ICD-10-CM

## 2024-09-04 DIAGNOSIS — E04.1 THYROID NODULE: Primary | ICD-10-CM

## 2024-09-04 DIAGNOSIS — R19.7 DIARRHEA, UNSPECIFIED TYPE: ICD-10-CM

## 2024-09-04 DIAGNOSIS — R19.7 DIARRHEA: Primary | ICD-10-CM

## 2024-09-04 DIAGNOSIS — R93.6 ABNORMAL X-RAY OF FEMUR: ICD-10-CM

## 2024-09-04 DIAGNOSIS — R82.71 BACTERIURIA: Primary | ICD-10-CM

## 2024-09-04 LAB
ALBUMIN SERPL BCG-MCNC: 4.2 G/DL (ref 3.5–5)
ALP SERPL-CCNC: 76 U/L (ref 34–104)
ALT SERPL W P-5'-P-CCNC: 11 U/L (ref 7–52)
ANION GAP SERPL CALCULATED.3IONS-SCNC: 8 MMOL/L (ref 4–13)
AST SERPL W P-5'-P-CCNC: 12 U/L (ref 13–39)
BACTERIA UR QL AUTO: ABNORMAL /HPF
BASOPHILS # BLD AUTO: 0.04 THOUSANDS/ÂΜL (ref 0–0.1)
BASOPHILS NFR BLD AUTO: 1 % (ref 0–1)
BILIRUB SERPL-MCNC: 0.38 MG/DL (ref 0.2–1)
BILIRUB UR QL STRIP: NEGATIVE
BUN SERPL-MCNC: 34 MG/DL (ref 5–25)
CALCIUM SERPL-MCNC: 9.4 MG/DL (ref 8.4–10.2)
CHLORIDE SERPL-SCNC: 103 MMOL/L (ref 96–108)
CLARITY UR: CLEAR
CO2 SERPL-SCNC: 31 MMOL/L (ref 21–32)
COLOR UR: YELLOW
CREAT SERPL-MCNC: 0.95 MG/DL (ref 0.6–1.3)
EOSINOPHIL # BLD AUTO: 0.17 THOUSAND/ÂΜL (ref 0–0.61)
EOSINOPHIL NFR BLD AUTO: 3 % (ref 0–6)
ERYTHROCYTE [DISTWIDTH] IN BLOOD BY AUTOMATED COUNT: 12.9 % (ref 11.6–15.1)
GFR SERPL CREATININE-BSD FRML MDRD: 58 ML/MIN/1.73SQ M
GLUCOSE P FAST SERPL-MCNC: 139 MG/DL (ref 65–99)
GLUCOSE UR STRIP-MCNC: NEGATIVE MG/DL
HCT VFR BLD AUTO: 39.6 % (ref 34.8–46.1)
HGB BLD-MCNC: 13.1 G/DL (ref 11.5–15.4)
HGB UR QL STRIP.AUTO: NEGATIVE
IMM GRANULOCYTES # BLD AUTO: 0.02 THOUSAND/UL (ref 0–0.2)
IMM GRANULOCYTES NFR BLD AUTO: 0 % (ref 0–2)
KETONES UR STRIP-MCNC: NEGATIVE MG/DL
LEUKOCYTE ESTERASE UR QL STRIP: ABNORMAL
LIPASE SERPL-CCNC: 31 U/L (ref 11–82)
LYMPHOCYTES # BLD AUTO: 1.78 THOUSANDS/ÂΜL (ref 0.6–4.47)
LYMPHOCYTES NFR BLD AUTO: 35 % (ref 14–44)
MCH RBC QN AUTO: 28.6 PG (ref 26.8–34.3)
MCHC RBC AUTO-ENTMCNC: 33.1 G/DL (ref 31.4–37.4)
MCV RBC AUTO: 87 FL (ref 82–98)
MONOCYTES # BLD AUTO: 0.38 THOUSAND/ÂΜL (ref 0.17–1.22)
MONOCYTES NFR BLD AUTO: 8 % (ref 4–12)
NEUTROPHILS # BLD AUTO: 2.69 THOUSANDS/ÂΜL (ref 1.85–7.62)
NEUTS SEG NFR BLD AUTO: 53 % (ref 43–75)
NITRITE UR QL STRIP: POSITIVE
NON-SQ EPI CELLS URNS QL MICRO: ABNORMAL /HPF
NRBC BLD AUTO-RTO: 0 /100 WBCS
PH UR STRIP.AUTO: 6 [PH]
PLATELET # BLD AUTO: 229 THOUSANDS/UL (ref 149–390)
PMV BLD AUTO: 9.8 FL (ref 8.9–12.7)
POTASSIUM SERPL-SCNC: 3.9 MMOL/L (ref 3.5–5.3)
PROT SERPL-MCNC: 6.8 G/DL (ref 6.4–8.4)
PROT UR STRIP-MCNC: NEGATIVE MG/DL
RBC # BLD AUTO: 4.58 MILLION/UL (ref 3.81–5.12)
RBC #/AREA URNS AUTO: ABNORMAL /HPF
SODIUM SERPL-SCNC: 142 MMOL/L (ref 135–147)
SP GR UR STRIP.AUTO: >=1.03 (ref 1–1.03)
UROBILINOGEN UR QL STRIP.AUTO: 0.2 E.U./DL
WBC # BLD AUTO: 5.08 THOUSAND/UL (ref 4.31–10.16)
WBC #/AREA URNS AUTO: ABNORMAL /HPF

## 2024-09-04 PROCEDURE — 74177 CT ABD & PELVIS W/CONTRAST: CPT

## 2024-09-04 PROCEDURE — 36415 COLL VENOUS BLD VENIPUNCTURE: CPT

## 2024-09-04 PROCEDURE — 99284 EMERGENCY DEPT VISIT MOD MDM: CPT

## 2024-09-04 PROCEDURE — 85025 COMPLETE CBC W/AUTO DIFF WBC: CPT

## 2024-09-04 PROCEDURE — 81001 URINALYSIS AUTO W/SCOPE: CPT

## 2024-09-04 PROCEDURE — 83690 ASSAY OF LIPASE: CPT

## 2024-09-04 PROCEDURE — 99284 EMERGENCY DEPT VISIT MOD MDM: CPT | Performed by: EMERGENCY MEDICINE

## 2024-09-04 PROCEDURE — 80053 COMPREHEN METABOLIC PANEL: CPT

## 2024-09-04 RX ORDER — SULFAMETHOXAZOLE/TRIMETHOPRIM 800-160 MG
1 TABLET ORAL 2 TIMES DAILY
Qty: 6 TABLET | Refills: 0 | Status: SHIPPED | OUTPATIENT
Start: 2024-09-04 | End: 2024-09-07

## 2024-09-04 RX ORDER — SULFAMETHOXAZOLE/TRIMETHOPRIM 800-160 MG
1 TABLET ORAL ONCE
Status: COMPLETED | OUTPATIENT
Start: 2024-09-04 | End: 2024-09-04

## 2024-09-04 RX ADMIN — IOHEXOL 100 ML: 350 INJECTION, SOLUTION INTRAVENOUS at 09:08

## 2024-09-04 RX ADMIN — SULFAMETHOXAZOLE AND TRIMETHOPRIM 1 TABLET: 800; 160 TABLET ORAL at 10:11

## 2024-09-04 NOTE — TELEPHONE ENCOUNTER
----- Message from Adan Calvo PA-C sent at 9/4/2024  7:29 AM EDT -----  There are bacteria and white blood cells on urinalysis.  I will prescribe Bactrim twice a day for 3 days.  Let me know how symptoms are that we discussed yesterday after antibiotic.

## 2024-09-04 NOTE — TELEPHONE ENCOUNTER
Pt aware.  States she is down at [facility] for her CT scan appt.  I told her she should go to the ER afterwards to see about the IV fluids and evaluation

## 2024-09-04 NOTE — TELEPHONE ENCOUNTER
----- Message from Adan Calvo PA-C sent at 9/4/2024  7:56 AM EDT -----  Kidney function is decreased compared to the typical levels when comparing over the past 2 years, especially when comparing to GFR from 2 months ago.  Creatinine did increase a bit, but is still at a normal level.  Given symptoms of diarrhea and abdominal pain, this very well could be due to the diarrhea causing dehydration which is affecting kidney function.  I do recommend going to the emergency room for possible IV fluids and evaluation.

## 2024-09-04 NOTE — TELEPHONE ENCOUNTER
----- Message from Adan Calvo PA-C sent at 9/4/2024 10:57 AM EDT -----  No acute abnormalities on abdominal CT.  I will be placing a thyroid ultrasound and x-rays of the lumbar spine and right femur.

## 2024-09-04 NOTE — ED PROVIDER NOTES
History  Chief Complaint   Patient presents with    Abdominal Pain     States that she has been having upper abdominal pain for a few weeks and she went for blood work this morning and a CT scan. She said that the doctors office sent her over to be seen because of the results. She said that she has been having N/V/D.       History provided by:  Medical records and patient  Abdominal Pain  Pain location:  Generalized (Mainly upper)  Pain quality: cramping    Pain radiates to:  Does not radiate  Pain severity:  Mild  Onset quality:  Gradual  Duration:  8 weeks  Timing:  Intermittent  Progression:  Waxing and waning  Chronicity:  New  Context comment:  Patient reports having crampy abdominal pain and diarrhea shortly after eating almost every meal for the past 2 months.  Seen by PCP yesterday for similar, placed in for outpatient lab work and CT scan of the abdomen pelvis.  Completed this morning.  Relieved by:  Nothing  Worsened by:  Nothing  Ineffective treatments:  None tried  Associated symptoms: diarrhea    Associated symptoms: no anorexia, no belching, no chest pain, no chills, no constipation, no cough, no dysuria, no fatigue, no fever, no flatus, no hematemesis, no hematochezia, no hematuria, no melena, no nausea, no shortness of breath, no sore throat, no vaginal bleeding, no vaginal discharge and no vomiting        Prior to Admission Medications   Prescriptions Last Dose Informant Patient Reported? Taking?   Cholecalciferol 50 MCG (2000 UT) TABS   No No   Sig: Take 1 tablet (2,000 Units total) by mouth every morning   Patient not taking: Reported on 1/15/2024   Diclofenac Sodium (VOLTAREN) 1 %   No No   Sig: Apply 4 g topically 4 (four) times a day   Patient not taking: Reported on 9/3/2024   acetaminophen (TYLENOL) 500 mg tablet   Yes No   Sig: Take 500 mg by mouth   aspirin 81 mg chewable tablet   Yes No   Sig: Chew 81 mg   calcium citrate-vitamin D (Calcium Citrate + D3) 315-200 MG-UNIT per tablet   No No    Sig: Take 2 tablets by mouth 2 (two) times a day   carbamide peroxide (DEBROX) 6.5 % otic solution   No No   Sig: Administer 5 drops into the left ear 2 (two) times a day for 4 days   cycloSPORINE (Restasis) 0.05 % ophthalmic emulsion   Yes No   levETIRAcetam (KEPPRA) 1000 MG tablet   No No   Sig: Take 1 tablet (1,000 mg total) by mouth 2 (two) times a day   losartan-hydrochlorothiazide (HYZAAR) 100-25 MG per tablet   No No   Sig: take 1 tablet by mouth once daily   omeprazole (PriLOSEC) 20 mg delayed release capsule   No No   Sig: Take 1 capsule (20 mg total) by mouth daily   simvastatin (ZOCOR) 40 mg tablet   No No   Sig: Take 1 tablet (40 mg total) by mouth daily at bedtime   sulfamethoxazole-trimethoprim (BACTRIM DS) 800-160 mg per tablet   No No   Sig: Take 1 tablet by mouth 2 (two) times a day for 3 days   venlafaxine (EFFEXOR-XR) 75 mg 24 hr capsule   No No   Sig: take 1 capsule by mouth once daily      Facility-Administered Medications: None       Past Medical History:   Diagnosis Date    Breast cancer (HCC)     Diabetes mellitus (HCC)        Past Surgical History:   Procedure Laterality Date    HYSTERECTOMY      MASTECTOMY         Family History   Problem Relation Age of Onset    Cancer Mother     Heart disease Mother     Diabetes Mother     Hypertension Mother     Heart disease Father     Diabetes Father     Hypertension Father     Cancer Sister     Diabetes Sister     Cancer Brother     Diabetes Brother     Asthma Son     Hypertension Son     Kidney disease Daughter      I have reviewed and agree with the history as documented.    E-Cigarette/Vaping    E-Cigarette Use Never User      E-Cigarette/Vaping Substances     Social History     Tobacco Use    Smoking status: Former     Current packs/day: 0.00     Types: Cigarettes     Start date: 7/1/1964     Quit date: 1/1/2021     Years since quitting: 3.6    Smokeless tobacco: Never   Vaping Use    Vaping status: Never Used   Substance Use Topics    Alcohol  use: Yes     Alcohol/week: 7.0 standard drinks of alcohol     Types: 7 Glasses of wine per week    Drug use: Never       Review of Systems   Constitutional:  Negative for chills, fatigue and fever.   HENT:  Negative for ear discharge, ear pain, rhinorrhea and sore throat.    Eyes:  Negative for pain and visual disturbance.   Respiratory:  Negative for cough and shortness of breath.    Cardiovascular:  Negative for chest pain and palpitations.   Gastrointestinal:  Positive for abdominal pain and diarrhea. Negative for abdominal distention, anal bleeding, anorexia, blood in stool, constipation, flatus, hematemesis, hematochezia, melena, nausea, rectal pain and vomiting.   Endocrine: Negative for polydipsia, polyphagia and polyuria.   Genitourinary:  Negative for difficulty urinating, dysuria, flank pain, hematuria, vaginal bleeding and vaginal discharge.   Musculoskeletal:  Negative for arthralgias and back pain.   Skin:  Negative for color change and rash.   Allergic/Immunologic: Negative for immunocompromised state.   Neurological:  Negative for dizziness, seizures, syncope, weakness and headaches.   Psychiatric/Behavioral:  Negative for confusion and self-injury. The patient is not nervous/anxious.    All other systems reviewed and are negative.      Physical Exam  Physical Exam  Vitals and nursing note reviewed.   Constitutional:       General: She is not in acute distress.     Appearance: Normal appearance. She is not ill-appearing, toxic-appearing or diaphoretic.   HENT:      Head: Normocephalic and atraumatic.      Nose: Nose normal. No congestion or rhinorrhea.      Mouth/Throat:      Mouth: Mucous membranes are moist.      Pharynx: Oropharynx is clear. No oropharyngeal exudate or posterior oropharyngeal erythema.   Eyes:      General:         Right eye: No discharge.         Left eye: No discharge.   Cardiovascular:      Rate and Rhythm: Normal rate and regular rhythm.      Pulses: Normal pulses.      Heart  sounds: Normal heart sounds. No murmur heard.     No gallop.   Pulmonary:      Effort: Pulmonary effort is normal. No respiratory distress.      Breath sounds: Normal breath sounds. No stridor. No wheezing, rhonchi or rales.   Chest:      Chest wall: No tenderness.   Abdominal:      General: Bowel sounds are normal. There is no distension.      Palpations: Abdomen is soft. There is no mass.      Tenderness: There is no abdominal tenderness. There is no right CVA tenderness, left CVA tenderness, guarding or rebound.      Hernia: No hernia is present.   Musculoskeletal:         General: Normal range of motion.      Cervical back: Normal range of motion and neck supple.   Skin:     General: Skin is warm and dry.      Capillary Refill: Capillary refill takes less than 2 seconds.   Neurological:      General: No focal deficit present.      Mental Status: She is alert and oriented to person, place, and time.      Cranial Nerves: No cranial nerve deficit.      Sensory: No sensory deficit.      Motor: No weakness.      Coordination: Coordination normal.      Gait: Gait normal.      Deep Tendon Reflexes: Reflexes normal.   Psychiatric:         Mood and Affect: Mood normal.         Behavior: Behavior normal.         Thought Content: Thought content normal.         Judgment: Judgment normal.         Vital Signs  ED Triage Vitals [09/04/24 0929]   Temperature Pulse Respirations Blood Pressure SpO2   98.8 °F (37.1 °C) 62 18 168/71 98 %      Temp Source Heart Rate Source Patient Position - Orthostatic VS BP Location FiO2 (%)   Temporal Monitor Lying Left arm --      Pain Score       3           Vitals:    09/04/24 0929   BP: 168/71   Pulse: 62   Patient Position - Orthostatic VS: Lying         Visual Acuity      ED Medications  Medications   sulfamethoxazole-trimethoprim (BACTRIM DS) 800-160 mg per tablet 1 tablet (1 tablet Oral Given 9/4/24 1011)       Diagnostic Studies  Results Reviewed       None                   No orders  to display              Procedures  Procedures         ED Course                                 SBIRT 20yo+      Flowsheet Row Most Recent Value   Initial Alcohol Screen: US AUDIT-C     1. How often do you have a drink containing alcohol? 0 Filed at: 09/04/2024 0934   2. How many drinks containing alcohol do you have on a typical day you are drinking?  0 Filed at: 09/04/2024 0934   3b. FEMALE Any Age, or MALE 65+: How often do you have 4 or more drinks on one occassion? 0 Filed at: 09/04/2024 0934   Audit-C Score 0 Filed at: 09/04/2024 0934   JOHN: How many times in the past year have you...    Used an illegal drug or used a prescription medication for non-medical reasons? Never Filed at: 09/04/2024 0934                      Medical Decision Making  0923: Patient appears well, vital signs reviewed.  Benign abdominal exam, nontender.  Patient with 2 months of diarrhea after eating and crampy abdominal pain.  Patient had outpatient CT scan completed this morning.  Discussed with radiology department to place a stat read.  Outpatient labs reviewed, urinalysis with evidence of bacteriuria and potential UTI.  PCP prescribed Bactrim.  The patient is aware of this prescription.    1000: CT results reviewed with radiologist, no acute pathology.  Plan to start on antibiotics, close follow-up with PCP if symptoms persist, possible referral to gastroenterology.    Amount and/or Complexity of Data Reviewed  Radiology: ordered and independent interpretation performed.     Details: CT abdomen pelvis--no acute pathology    Risk  Prescription drug management.                 Disposition  Final diagnoses:   Diarrhea   UTI (urinary tract infection)     Time reflects when diagnosis was documented in both MDM as applicable and the Disposition within this note       Time User Action Codes Description Comment    9/4/2024 10:04 AM Lonnie Bright Add [R19.7] Diarrhea     9/4/2024 10:04 AM Lonnie Bright Add [N39.0] UTI (urinary tract  infection)           ED Disposition       ED Disposition   Discharge    Condition   Stable    Date/Time   Wed Sep 4, 2024 1004    Comment   Micaela Hough discharge to home/self care.                   Follow-up Information       Follow up With Specialties Details Why Contact Info    Adan Calvo PA-C Family Medicine, Physician Assistant Schedule an appointment as soon as possible for a visit   [address] [place] [id number]  [phone]              Patient's Medications   Discharge Prescriptions    No medications on file       No discharge procedures on file.    PDMP Review       None            ED Provider  Electronically Signed by             Lonnie Bright MD  09/04/24 1010

## 2024-09-06 ENCOUNTER — TELEPHONE (OUTPATIENT)
Age: 74
End: 2024-09-06

## 2024-09-06 NOTE — TELEPHONE ENCOUNTER
Patient went to ER as requested for IV antibiotics but ER felt it wasn't necessary. Urine still cloudy and has an odor. Can she has another round of Bactrim?

## 2024-09-09 DIAGNOSIS — R94.4 DECREASED CALCULATED GFR: ICD-10-CM

## 2024-09-09 DIAGNOSIS — R82.71 BACTERIURIA: Primary | ICD-10-CM

## 2024-09-10 ENCOUNTER — APPOINTMENT (OUTPATIENT)
Dept: LAB | Facility: CLINIC | Age: 74
End: 2024-09-10
Payer: COMMERCIAL

## 2024-09-10 ENCOUNTER — OFFICE VISIT (OUTPATIENT)
Dept: FAMILY MEDICINE CLINIC | Facility: CLINIC | Age: 74
End: 2024-09-10
Payer: COMMERCIAL

## 2024-09-10 VITALS
SYSTOLIC BLOOD PRESSURE: 148 MMHG | OXYGEN SATURATION: 98 % | TEMPERATURE: 97.5 F | BODY MASS INDEX: 31.96 KG/M2 | WEIGHT: 198 LBS | HEART RATE: 56 BPM | DIASTOLIC BLOOD PRESSURE: 80 MMHG

## 2024-09-10 DIAGNOSIS — Z78.0 ASYMPTOMATIC MENOPAUSAL STATE: ICD-10-CM

## 2024-09-10 DIAGNOSIS — I10 PRIMARY HYPERTENSION: ICD-10-CM

## 2024-09-10 DIAGNOSIS — R10.84 GENERALIZED ABDOMINAL PAIN: ICD-10-CM

## 2024-09-10 DIAGNOSIS — R82.71 BACTERIURIA: ICD-10-CM

## 2024-09-10 DIAGNOSIS — R94.4 DECREASED CALCULATED GFR: ICD-10-CM

## 2024-09-10 DIAGNOSIS — M85.80 OSTEOPENIA, UNSPECIFIED LOCATION: ICD-10-CM

## 2024-09-10 DIAGNOSIS — R94.4 DECREASED CALCULATED GFR: Primary | ICD-10-CM

## 2024-09-10 LAB
ALBUMIN SERPL BCG-MCNC: 4.1 G/DL (ref 3.5–5)
ALP SERPL-CCNC: 68 U/L (ref 34–104)
ALT SERPL W P-5'-P-CCNC: 13 U/L (ref 7–52)
ANION GAP SERPL CALCULATED.3IONS-SCNC: 7 MMOL/L (ref 4–13)
AST SERPL W P-5'-P-CCNC: 14 U/L (ref 13–39)
BACTERIA UR QL AUTO: ABNORMAL /HPF
BILIRUB SERPL-MCNC: 0.26 MG/DL (ref 0.2–1)
BILIRUB UR QL STRIP: NEGATIVE
BUN SERPL-MCNC: 24 MG/DL (ref 5–25)
CALCIUM SERPL-MCNC: 9.1 MG/DL (ref 8.4–10.2)
CHLORIDE SERPL-SCNC: 104 MMOL/L (ref 96–108)
CLARITY UR: CLEAR
CO2 SERPL-SCNC: 27 MMOL/L (ref 21–32)
COLOR UR: ABNORMAL
CREAT SERPL-MCNC: 0.81 MG/DL (ref 0.6–1.3)
GFR SERPL CREATININE-BSD FRML MDRD: 70 ML/MIN/1.73SQ M
GLUCOSE P FAST SERPL-MCNC: 118 MG/DL (ref 65–99)
GLUCOSE UR STRIP-MCNC: NEGATIVE MG/DL
HGB UR QL STRIP.AUTO: NEGATIVE
KETONES UR STRIP-MCNC: NEGATIVE MG/DL
LEUKOCYTE ESTERASE UR QL STRIP: ABNORMAL
NITRITE UR QL STRIP: NEGATIVE
NON-SQ EPI CELLS URNS QL MICRO: ABNORMAL /HPF
PH UR STRIP.AUTO: 6.5 [PH]
POTASSIUM SERPL-SCNC: 3.8 MMOL/L (ref 3.5–5.3)
PROT SERPL-MCNC: 6.4 G/DL (ref 6.4–8.4)
PROT UR STRIP-MCNC: NEGATIVE MG/DL
RBC #/AREA URNS AUTO: ABNORMAL /HPF
SODIUM SERPL-SCNC: 138 MMOL/L (ref 135–147)
SP GR UR STRIP.AUTO: 1.02 (ref 1–1.03)
UROBILINOGEN UR STRIP-ACNC: <2 MG/DL
WBC #/AREA URNS AUTO: ABNORMAL /HPF

## 2024-09-10 PROCEDURE — 36415 COLL VENOUS BLD VENIPUNCTURE: CPT

## 2024-09-10 PROCEDURE — 87086 URINE CULTURE/COLONY COUNT: CPT

## 2024-09-10 PROCEDURE — 99213 OFFICE O/P EST LOW 20 MIN: CPT

## 2024-09-10 PROCEDURE — 80053 COMPREHEN METABOLIC PANEL: CPT

## 2024-09-10 PROCEDURE — G2211 COMPLEX E/M VISIT ADD ON: HCPCS

## 2024-09-10 PROCEDURE — 81001 URINALYSIS AUTO W/SCOPE: CPT

## 2024-09-10 RX ORDER — LOSARTAN POTASSIUM AND HYDROCHLOROTHIAZIDE 25; 100 MG/1; MG/1
1 TABLET ORAL DAILY
Qty: 90 TABLET | Refills: 1 | Status: SHIPPED | OUTPATIENT
Start: 2024-09-10

## 2024-09-10 NOTE — ASSESSMENT & PLAN NOTE
Systolic elevation today.  Will continue to monitor at visits.  Encouraged at home monitoring, and contact office if persistently greater than 140/90.    Orders:    losartan-hydrochlorothiazide (HYZAAR) 100-25 MG per tablet; Take 1 tablet by mouth daily

## 2024-09-10 NOTE — PROGRESS NOTES
Ambulatory Visit  Name: Micaela Hough      : 1950      MRN: [id number]  Encounter Provider: Adan Calvo PA-C  Encounter Date: 9/10/2024   Encounter department: [facility]    Assessment & Plan  Decreased calculated GFR  Will follow-up pending lab results.  Likely was due to prerenal cause given diarrhea, nausea/vomiting, and decreased kidney function with an increase in BUN and BUN/creatinine ratio greater than 20.  Educated on adequate hydration.  Did not get IV fluids when in ER.  Will follow-up pending results.  Educated on signs/symptoms of JAVI, and is to go to ER if these present.  Normal urine output.       Bacteriuria  Was treated with Bactrim.  Still is having abdominal pain.  Will give Augmentin and repeat urine studies.  Educated on signs/symptoms of worsening, and is to go to ER if these present.       Generalized abdominal pain  CT was normal.  Did have incidental findings of thyroid nodule and sclerotic lesions on bones.  Did place thyroid ultrasound which she is scheduled for and x-rays.  Will continue to monitor lab work and UTI symptoms.       Osteopenia, unspecified location  Will do DEXA scan.       Asymptomatic menopausal state    Orders:    DXA bone density spine hip and pelvis; Future    Primary hypertension  Systolic elevation today.  Will continue to monitor at visits.  Encouraged at home monitoring, and contact office if persistently greater than 140/90.    Orders:    losartan-hydrochlorothiazide (HYZAAR) 100-25 MG per tablet; Take 1 tablet by mouth daily       History of Present Illness     Patient is a 74-year-old female presenting for follow-up.  Patient had lab work and imaging done after last visit with diarrhea and abdominal pain.  Patient decreased kidney function and was sent to the emergency department.  Had vitals checked, and was discharged from emergency department.  Denies any current decreased urine output, diarrhea, nausea, vomiting,  dizziness, syncope.  Has been hydrating with water more than usual.  Does still have mild abdominal pain.  Denies any other symptoms of UTI.  Did went for repeat lab work and urine studies today.        History obtained from : patient  Review of Systems   Constitutional:  Negative for appetite change, chills, diaphoresis, fatigue and fever.   HENT:  Negative for congestion, ear discharge, ear pain, postnasal drip, rhinorrhea, sinus pressure, sinus pain, sneezing and sore throat.    Eyes:  Negative for pain, discharge, redness, itching and visual disturbance.   Respiratory:  Negative for apnea, cough, chest tightness, shortness of breath and wheezing.    Cardiovascular:  Negative for chest pain, palpitations and leg swelling.   Gastrointestinal:  Positive for abdominal pain. Negative for blood in stool, constipation, diarrhea, nausea and vomiting.   Endocrine: Negative for cold intolerance, heat intolerance, polydipsia and polyuria.   Genitourinary:  Negative for dysuria, flank pain, frequency, hematuria and urgency.   Musculoskeletal:  Negative for arthralgias, back pain, myalgias, neck pain and neck stiffness.   Skin:  Negative for color change and rash.   Allergic/Immunologic: Negative for environmental allergies and food allergies.   Neurological:  Negative for dizziness, tremors, seizures, syncope, speech difficulty, weakness, light-headedness, numbness and headaches.   Hematological:  Negative for adenopathy. Does not bruise/bleed easily.   Psychiatric/Behavioral:  Negative for agitation, confusion, decreased concentration, dysphoric mood, hallucinations, self-injury, sleep disturbance and suicidal ideas. The patient is not nervous/anxious and is not hyperactive.    All other systems reviewed and are negative.    Medical History Reviewed by provider this encounter:       Current Outpatient Medications on File Prior to Visit   Medication Sig Dispense Refill    acetaminophen (TYLENOL) 500 mg tablet Take 500 mg by  mouth      amoxicillin-clavulanate (AUGMENTIN) 875-125 mg per tablet Take 1 tablet by mouth every 12 (twelve) hours for 10 days 20 tablet 0    aspirin 81 mg chewable tablet Chew 81 mg      calcium citrate-vitamin D (Calcium Citrate + D3) 315-200 MG-UNIT per tablet Take 2 tablets by mouth 2 (two) times a day 360 tablet 0    cycloSPORINE (Restasis) 0.05 % ophthalmic emulsion       levETIRAcetam (KEPPRA) 1000 MG tablet Take 1 tablet (1,000 mg total) by mouth 2 (two) times a day 180 tablet 3    omeprazole (PriLOSEC) 20 mg delayed release capsule Take 1 capsule (20 mg total) by mouth daily 90 capsule 3    simvastatin (ZOCOR) 40 mg tablet Take 1 tablet (40 mg total) by mouth daily at bedtime 100 tablet 3    venlafaxine (EFFEXOR-XR) 75 mg 24 hr capsule take 1 capsule by mouth once daily 90 capsule 1    [DISCONTINUED] losartan-hydrochlorothiazide (HYZAAR) 100-25 MG per tablet take 1 tablet by mouth once daily 90 tablet 1    carbamide peroxide (DEBROX) 6.5 % otic solution Administer 5 drops into the left ear 2 (two) times a day for 4 days 15 mL 0    Cholecalciferol 50 MCG (2000 UT) TABS Take 1 tablet (2,000 Units total) by mouth every morning (Patient not taking: Reported on 1/15/2024) 90 tablet 3    Diclofenac Sodium (VOLTAREN) 1 % Apply 4 g topically 4 (four) times a day (Patient not taking: Reported on 9/3/2024) 500 g 5     No current facility-administered medications on file prior to visit.      Social History     Tobacco Use    Smoking status: Former     Current packs/day: 0.00     Types: Cigarettes     Start date: 7/1/1964     Quit date: 1/1/2021     Years since quitting: 3.6    Smokeless tobacco: Never   Vaping Use    Vaping status: Never Used   Substance and Sexual Activity    Alcohol use: Yes     Alcohol/week: 7.0 standard drinks of alcohol     Types: 7 Glasses of wine per week    Drug use: Never    Sexual activity: Not Currently         Objective     /80 (BP Location: Left arm, Patient Position: Sitting)    Pulse 56   Temp 97.5 °F (36.4 °C) (Tympanic)   Wt 89.8 kg (198 lb)   SpO2 98%   BMI 31.96 kg/m²     Physical Exam  Vitals and nursing note reviewed.   Constitutional:       General: She is not in acute distress.     Appearance: Normal appearance. She is well-developed and normal weight. She is not ill-appearing, toxic-appearing or diaphoretic.   HENT:      Head: Normocephalic and atraumatic.      Right Ear: Tympanic membrane normal.      Left Ear: Tympanic membrane normal.      Nose: Nose normal.      Mouth/Throat:      Mouth: Mucous membranes are moist.      Pharynx: Oropharynx is clear.   Eyes:      Conjunctiva/sclera: Conjunctivae normal.      Pupils: Pupils are equal, round, and reactive to light.   Cardiovascular:      Rate and Rhythm: Normal rate and regular rhythm.      Pulses: Normal pulses.      Heart sounds: Normal heart sounds. No murmur heard.  Pulmonary:      Effort: Pulmonary effort is normal. No respiratory distress.      Breath sounds: Normal breath sounds. No wheezing.   Chest:      Chest wall: No tenderness.   Abdominal:      General: Bowel sounds are normal. There is no distension.      Palpations: Abdomen is soft. There is no mass.      Tenderness: There is no abdominal tenderness. There is no right CVA tenderness, left CVA tenderness, guarding or rebound.      Hernia: No hernia is present.   Musculoskeletal:         General: No swelling or tenderness.      Cervical back: Normal range of motion and neck supple. No tenderness.      Right lower leg: No edema.      Left lower leg: No edema.   Lymphadenopathy:      Cervical: No cervical adenopathy.   Skin:     General: Skin is warm and dry.      Capillary Refill: Capillary refill takes less than 2 seconds.      Findings: No erythema, lesion or rash.   Neurological:      General: No focal deficit present.      Mental Status: She is alert and oriented to person, place, and time. Mental status is at baseline.      Motor: No weakness.       Coordination: Coordination normal.      Gait: Gait normal.   Psychiatric:         Mood and Affect: Mood normal.         Behavior: Behavior normal.         Thought Content: Thought content normal.         Judgment: Judgment normal.

## 2024-09-11 ENCOUNTER — TELEPHONE (OUTPATIENT)
Dept: FAMILY MEDICINE CLINIC | Facility: CLINIC | Age: 74
End: 2024-09-11

## 2024-09-11 LAB — BACTERIA UR CULT: NORMAL

## 2024-09-11 NOTE — TELEPHONE ENCOUNTER
----- Message from Adan Calvo PA-C sent at 9/10/2024  4:53 PM EDT -----  Continue antibiotic.  Moderate leukocytes, but no bacteria on urinalysis.  Will follow-up pending rest of lab results.

## 2024-09-11 NOTE — TELEPHONE ENCOUNTER
----- Message from Adan Calvo PA-C sent at 9/11/2024  7:28 AM EDT -----  Kidney function is improving.  Continue adequate hydration.

## 2024-09-12 ENCOUNTER — TELEPHONE (OUTPATIENT)
Dept: FAMILY MEDICINE CLINIC | Facility: CLINIC | Age: 74
End: 2024-09-12

## 2024-09-26 ENCOUNTER — HOSPITAL ENCOUNTER (OUTPATIENT)
Dept: ULTRASOUND IMAGING | Facility: HOSPITAL | Age: 74
End: 2024-09-26
Payer: COMMERCIAL

## 2024-09-26 DIAGNOSIS — E04.1 THYROID NODULE: ICD-10-CM

## 2024-09-26 PROCEDURE — 76536 US EXAM OF HEAD AND NECK: CPT

## 2024-09-30 ENCOUNTER — TELEPHONE (OUTPATIENT)
Dept: FAMILY MEDICINE CLINIC | Facility: CLINIC | Age: 74
End: 2024-09-30

## 2024-09-30 NOTE — TELEPHONE ENCOUNTER
----- Message from CODIE Goldsmith sent at 9/29/2024 10:10 PM EDT -----  Can we please call Micaela - her thyroid US did not show any nodules at this time.  Can we ask if she is still having pain and/or diarrhea?    Thanks.

## 2024-10-01 ENCOUNTER — HOSPITAL ENCOUNTER (OUTPATIENT)
Dept: RADIOLOGY | Facility: CLINIC | Age: 74
Discharge: HOME | End: 2024-10-01
Payer: COMMERCIAL

## 2024-10-01 DIAGNOSIS — Z78.0 ASYMPTOMATIC MENOPAUSAL STATE: ICD-10-CM

## 2024-10-01 PROCEDURE — 77080 DXA BONE DENSITY AXIAL: CPT

## 2024-10-10 ENCOUNTER — TELEPHONE (OUTPATIENT)
Dept: FAMILY MEDICINE CLINIC | Facility: CLINIC | Age: 74
End: 2024-10-10

## 2024-10-10 NOTE — TELEPHONE ENCOUNTER
----- Message from CODIE Timmons sent at 10/9/2024  7:14 PM EDT -----  Please notify patient her Dexa Scan showed Osteopenia so I would like her to continue the Calcium with Vitamin D.

## 2024-10-16 ENCOUNTER — PATIENT OUTREACH (OUTPATIENT)
Dept: CASE MANAGEMENT | Facility: OTHER | Age: 74
End: 2024-10-16

## 2024-10-16 ENCOUNTER — OFFICE VISIT (OUTPATIENT)
Dept: FAMILY MEDICINE CLINIC | Facility: CLINIC | Age: 74
End: 2024-10-16
Payer: COMMERCIAL

## 2024-10-16 VITALS
DIASTOLIC BLOOD PRESSURE: 78 MMHG | WEIGHT: 195.8 LBS | BODY MASS INDEX: 31.47 KG/M2 | OXYGEN SATURATION: 97 % | SYSTOLIC BLOOD PRESSURE: 110 MMHG | HEART RATE: 58 BPM | TEMPERATURE: 94.6 F | HEIGHT: 66 IN

## 2024-10-16 DIAGNOSIS — Z59.41 FOOD INSECURITY: ICD-10-CM

## 2024-10-16 DIAGNOSIS — E78.2 MIXED HYPERLIPIDEMIA: ICD-10-CM

## 2024-10-16 DIAGNOSIS — R73.03 PRE-DIABETES: ICD-10-CM

## 2024-10-16 DIAGNOSIS — Z17.0 MALIGNANT NEOPLASM OF RIGHT BREAST IN FEMALE, ESTROGEN RECEPTOR POSITIVE, UNSPECIFIED SITE OF BREAST (HCC): ICD-10-CM

## 2024-10-16 DIAGNOSIS — I10 PRIMARY HYPERTENSION: ICD-10-CM

## 2024-10-16 DIAGNOSIS — N39.46 MIXED STRESS AND URGE URINARY INCONTINENCE: ICD-10-CM

## 2024-10-16 DIAGNOSIS — M18.12 ARTHRITIS OF CARPOMETACARPAL (CMC) JOINT OF LEFT THUMB: ICD-10-CM

## 2024-10-16 DIAGNOSIS — Z00.00 MEDICARE ANNUAL WELLNESS VISIT, SUBSEQUENT: Primary | ICD-10-CM

## 2024-10-16 DIAGNOSIS — R56.9 GENERALIZED CONVULSIVE SEIZURE (HCC): ICD-10-CM

## 2024-10-16 DIAGNOSIS — C50.911 MALIGNANT NEOPLASM OF RIGHT BREAST IN FEMALE, ESTROGEN RECEPTOR POSITIVE, UNSPECIFIED SITE OF BREAST (HCC): ICD-10-CM

## 2024-10-16 DIAGNOSIS — K21.9 GASTROESOPHAGEAL REFLUX DISEASE, UNSPECIFIED WHETHER ESOPHAGITIS PRESENT: ICD-10-CM

## 2024-10-16 DIAGNOSIS — F39 MOOD DISORDER (HCC): ICD-10-CM

## 2024-10-16 DIAGNOSIS — Z23 ENCOUNTER FOR IMMUNIZATION: ICD-10-CM

## 2024-10-16 PROCEDURE — 90662 IIV NO PRSV INCREASED AG IM: CPT

## 2024-10-16 PROCEDURE — G0439 PPPS, SUBSEQ VISIT: HCPCS

## 2024-10-16 PROCEDURE — G0008 ADMIN INFLUENZA VIRUS VAC: HCPCS

## 2024-10-16 SDOH — ECONOMIC STABILITY - FOOD INSECURITY: FOOD INSECURITY: Z59.41

## 2024-10-16 NOTE — PROGRESS NOTES
Ambulatory Visit  Name: Micaela Hough      : 1950      MRN: [id number]  Encounter Provider: Adan Calvo PA-C  Encounter Date: 10/16/2024   Encounter department: [facility]    Assessment & Plan  Medicare annual wellness visit, subsequent         Primary hypertension  At goal today.  Encouraged at home monitoring and contact office if persistently greater than 140/90.  Continue Hyzaar.       Gastroesophageal reflux disease, unspecified whether esophagitis present  Continue conservative management.  Contact office with any worsening.       Arthritis of carpometacarpal (CMC) joint of left thumb  Stable on conservative management.  Contact office if worsening.       Mood disorder (HCC)  Stable.  Denies SI/HI.  Contact office with any worsening.       Mixed stress and urge urinary incontinence  Stable.  Contact office with worsening.       Malignant neoplasm of right breast in female, estrogen receptor positive, unspecified site of breast (HCC)  Continue regular follow-up with heme/onc.       Generalized convulsive seizure (HCC)  No seizures since last visit.  Continue Keppra.  Contact office if seizure recurs.       Pre-diabetes  Will continue to monitor.  Educated on healthy diet and activity.  Orders:    Comprehensive metabolic panel; Future    Hemoglobin A1C; Future    Lipid panel; Future    CBC and differential; Future    Mixed hyperlipidemia  Will continue to monitor.  Continue simvastatin.  Explained ASCVD risk score and potential need for increasing statin.  Orders:    Lipid panel; Future    Food insecurity  Referred to social work care management.  Orders:    Ambulatory Referral to Social Work Care Management Program; Future    Encounter for immunization    Orders:    influenza vaccine, high-dose, PF 0.5 mL (Fluzone High Dose)      Depression Screening and Follow-up Plan: Patient was screened for depression during today's encounter. They screened negative with a PHQ-2 score  of 0.    Falls Plan of Care: balance, strength, and gait training instructions were provided.     Urinary Incontinence Plan of Care: counseling topics discussed: use restroom every 2 hours, limit alcohol, caffeine, spicy foods, and acidic foods and limiting fluid intake 3-4 hours before bed.     Preventive health issues were discussed with patient, and age appropriate screening tests were ordered as noted in patient's After Visit Summary. Personalized health advice and appropriate referrals for health education or preventive services given if needed, as noted in patient's After Visit Summary.    History of Present Illness     HPI   Patient is a 74-year-old female presenting for Medicare annual wellness visit.  Patient has no concerns today.  Patient Care Team:  Adan Calvo PA-C as PCP - General (Family Medicine)    Review of Systems   Constitutional:  Negative for appetite change, chills, diaphoresis, fatigue and fever.   HENT:  Negative for congestion, ear discharge, ear pain, postnasal drip, rhinorrhea, sinus pressure, sinus pain, sneezing and sore throat.    Eyes:  Negative for pain, discharge, redness, itching and visual disturbance.   Respiratory:  Negative for apnea, cough, chest tightness, shortness of breath and wheezing.    Cardiovascular:  Negative for chest pain, palpitations and leg swelling.   Gastrointestinal:  Negative for abdominal pain, blood in stool, constipation, diarrhea, nausea and vomiting.   Endocrine: Negative for cold intolerance, heat intolerance, polydipsia and polyuria.   Genitourinary:  Negative for dysuria, flank pain, frequency, hematuria and urgency.   Musculoskeletal:  Negative for arthralgias, back pain, myalgias, neck pain and neck stiffness.   Skin:  Negative for color change and rash.   Allergic/Immunologic: Negative for environmental allergies and food allergies.   Neurological:  Negative for dizziness, tremors, seizures, syncope, speech difficulty, weakness,  light-headedness, numbness and headaches.   Hematological:  Negative for adenopathy. Does not bruise/bleed easily.   Psychiatric/Behavioral:  Negative for agitation, confusion, decreased concentration, dysphoric mood, hallucinations, self-injury, sleep disturbance and suicidal ideas. The patient is not nervous/anxious and is not hyperactive.    All other systems reviewed and are negative.    Medical History Reviewed by provider this encounter:       Annual Wellness Visit Questionnaire       Health Risk Assessment:   Patient rates overall health as good. Patient feels that their physical health rating is slightly better. Patient is very satisfied with their life. Eyesight was rated as slightly worse. Hearing was rated as same. Patient feels that their emotional and mental health rating is much better. Patients states they are never, rarely angry. Patient states they are often unusually tired/fatigued. Pain experienced in the last 7 days has been some. Patient's pain rating has been 2/10. Patient states that she has experienced no weight loss or gain in last 6 months.     Depression Screening:   PHQ-2 Score: 0      Fall Risk Screening:   In the past year, patient has experienced: no history of falling in past year      Urinary Incontinence Screening:   Patient has leaked urine accidently in the last six months.     Home Safety:  Patient has trouble with stairs inside or outside of their home. Patient has working smoke alarms and has working carbon monoxide detector. Home safety hazards include: none.     Nutrition:   Current diet is Unhealthy.     Medications:   Patient is currently taking over-the-counter supplements. OTC medications include: see medication list. Patient is able to manage medications.     Activities of Daily Living (ADLs)/Instrumental Activities of Daily Living (IADLs):   Walk and transfer into and out of bed and chair?: Yes  Dress and groom yourself?: Yes    Bathe or shower yourself?: Yes    Feed  yourself? Yes  Do your laundry/housekeeping?: Yes  Manage your money, pay your bills and track your expenses?: Yes  Make your own meals?: Yes    Do your own shopping?: Yes    Previous Hospitalizations:   Any hospitalizations or ED visits within the last 12 months?: No      PREVENTIVE SCREENINGS      Cardiovascular Screening:    General: Screening Not Indicated and History Lipid Disorder      Diabetes Screening:     General: Screening Not Indicated and History Diabetes      Breast Cancer Screening:     General: History Breast Cancer      Cervical Cancer Screening:    General: Screening Not Indicated      Lung Cancer Screening:     General: Screening Not Indicated      Hepatitis C Screening:    General: Screening Current    Screening, Brief Intervention, and Referral to Treatment (SBIRT)    Screening  Typical number of drinks in a day: 1  Typical number of drinks in a week: 5  Interpretation: Low risk drinking behavior.    AUDIT-C Screenin) How often did you have a drink containing alcohol in the past year? 4 or more times a week  2) How many drinks did you have on a typical day when you were drinking in the past year? 1 to 2  3) How often did you have 6 or more drinks on one occasion in the past year? never    AUDIT-C Score: 4  Interpretation: Score 3-12 (female): POSITIVE screen for alcohol misuse    AUDIT Screenin) How often during the last year have you found that you were not able to stop drinking once you had started? 0 - never  5) How often during the last year have you failed to do what was normally expected from you because of drinking? 0 - never  6) How often during the last year have you needed a first drink in the morning to get yourself going after a heavy drinking session? 0 - never  7) How often during the last year have you had a feeling of guilt or remorse after drinking? 0 - never  8) How often during the last year have you been unable to remember what happened the night before because you  had been drinking? 0 - never  9) Have you or someone else been injured as a result of your drinking? 0 - no  10) Has a relative or friend or a doctor or another health worker been concerned about your drinking or suggested you cut down? 0 - no    AUDIT Score: 4  Interpretation: Low risk alcohol consumption    Single Item Drug Screening:  How often have you used an illegal drug (including marijuana) or a prescription medication for non-medical reasons in the past year? never    Single Item Drug Screen Score: 0  Interpretation: Negative screen for possible drug use disorder    SDOH Risk Assessment  Social determinants of health (SDOH) risk assesment tool was completed. The tool at a minimum covered housing stability, food insecurity, transportation needs, and utility difficulty. Patient had at risk responses for the following SDOH domains: food insecurity.     Other Counseling Topics:   Car/seat belt/driving safety, skin self-exam, sunscreen and calcium and vitamin D intake and regular weightbearing exercise.     Social Determinants of Health     Financial Resource Strain: Low Risk  (7/11/2023)    Overall Financial Resource Strain (CARDIA)     Difficulty of Paying Living Expenses: Not hard at all   Food Insecurity: Food Insecurity Present (10/16/2024)    Hunger Vital Sign     Worried About Running Out of Food in the Last Year: Sometimes true     Ran Out of Food in the Last Year: Sometimes true   Transportation Needs: No Transportation Needs (10/16/2024)    PRAPARE - Transportation     Lack of Transportation (Medical): No     Lack of Transportation (Non-Medical): No   Housing Stability: Low Risk  (10/16/2024)    Housing Stability Vital Sign     Unable to Pay for Housing in the Last Year: No     Number of Times Moved in the Last Year: 1     Homeless in the Last Year: No   Utilities: Not At Risk (10/16/2024)    [facility] Utilities     Threatened with loss of utilities: No     No results found.    Objective     /78 (BP  "Location: Left arm, Patient Position: Sitting)   Pulse 58   Temp (!) 94.6 °F (34.8 °C) (Tympanic)   Ht 5' 6\" (1.676 m)   Wt 88.8 kg (195 lb 12.8 oz)   SpO2 97%   BMI 31.60 kg/m²     Physical Exam  Vitals and nursing note reviewed.   Constitutional:       General: She is not in acute distress.     Appearance: Normal appearance. She is well-developed. She is obese. She is not ill-appearing, toxic-appearing or diaphoretic.   HENT:      Head: Normocephalic and atraumatic.      Right Ear: Tympanic membrane normal.      Left Ear: Tympanic membrane normal.      Nose: Nose normal.      Mouth/Throat:      Mouth: Mucous membranes are moist.      Pharynx: Oropharynx is clear.   Eyes:      Conjunctiva/sclera: Conjunctivae normal.      Pupils: Pupils are equal, round, and reactive to light.   Cardiovascular:      Rate and Rhythm: Normal rate and regular rhythm.      Pulses: Normal pulses.      Heart sounds: Normal heart sounds. No murmur heard.  Pulmonary:      Effort: Pulmonary effort is normal. No respiratory distress.      Breath sounds: Normal breath sounds. No wheezing.   Chest:      Chest wall: No tenderness.   Abdominal:      General: Bowel sounds are normal.      Palpations: Abdomen is soft. There is no mass.      Tenderness: There is no abdominal tenderness.   Musculoskeletal:         General: No swelling or tenderness. Normal range of motion.      Cervical back: Normal range of motion and neck supple. No tenderness.      Right lower leg: No edema.      Left lower leg: No edema.   Lymphadenopathy:      Cervical: No cervical adenopathy.   Skin:     General: Skin is warm and dry.      Capillary Refill: Capillary refill takes less than 2 seconds.      Findings: No erythema, lesion or rash.   Neurological:      General: No focal deficit present.      Mental Status: She is alert and oriented to person, place, and time. Mental status is at baseline.      Motor: No weakness.      Coordination: Coordination normal.      " Gait: Gait normal.   Psychiatric:         Mood and Affect: Mood normal.         Behavior: Behavior normal.         Thought Content: Thought content normal.         Judgment: Judgment normal.

## 2024-10-16 NOTE — PATIENT INSTRUCTIONS
Medicare Preventive Visit Patient Instructions  Thank you for completing your Welcome to Medicare Visit or Medicare Annual Wellness Visit today. Your next wellness visit will be due in one year (10/17/2025).  The screening/preventive services that you may require over the next 5-10 years are detailed below. Some tests may not apply to you based off risk factors and/or age. Screening tests ordered at today's visit but not completed yet may show as past due. Also, please note that scanned in results may not display below.  Preventive Screenings:  Service Recommendations Previous Testing/Comments   Colorectal Cancer Screening  * Colonoscopy    * Fecal Occult Blood Test (FOBT)/Fecal Immunochemical Test (FIT)  * Fecal DNA/Cologuard Test  * Flexible Sigmoidoscopy Age: 45-75 years old   Colonoscopy: every 10 years (may be performed more frequently if at higher risk)  OR  FOBT/FIT: every 1 year  OR  Cologuard: every 3 years  OR  Sigmoidoscopy: every 5 years  Screening may be recommended earlier than age 45 if at higher risk for colorectal cancer. Also, an individualized decision between you and your healthcare provider will decide whether screening between the ages of 76-85 would be appropriate. Colonoscopy: Not on file  FOBT/FIT: Not on file  Cologuard: Not on file  Sigmoidoscopy: Not on file          Breast Cancer Screening Age: 40+ years old  Frequency: every 1-2 years  Not required if history of left and right mastectomy Mammogram: 04/13/2024        Cervical Cancer Screening Between the ages of 21-29, pap smear recommended once every 3 years.   Between the ages of 30-65, can perform pap smear with HPV co-testing every 5 years.   Recommendations may differ for women with a history of total hysterectomy, cervical cancer, or abnormal pap smears in past. Pap Smear: Not on file        Hepatitis C Screening Once for adults born between 1945 and 1965  More frequently in patients at high risk for Hepatitis C Hep C Antibody:  05/09/2022        Diabetes Screening 1-2 times per year if you're at risk for diabetes or have pre-diabetes Fasting glucose: 118 mg/dL (9/10/2024)  A1C: 6.7 % (6/14/2024)      Cholesterol Screening Once every 5 years if you don't have a lipid disorder. May order more often based on risk factors. Lipid panel: 06/14/2024          Other Preventive Screenings Covered by Medicare:  Abdominal Aortic Aneurysm (AAA) Screening: covered once if your at risk. You're considered to be at risk if you have a family history of AAA.  Lung Cancer Screening: covers low dose CT scan once per year if you meet all of the following conditions: (1) Age 55-77; (2) No signs or symptoms of lung cancer; (3) Current smoker or have quit smoking within the last 15 years; (4) You have a tobacco smoking history of at least 20 pack years (packs per day multiplied by number of years you smoked); (5) You get a written order from a healthcare provider.  Glaucoma Screening: covered annually if you're considered high risk: (1) You have diabetes OR (2) Family history of glaucoma OR (3)  aged 50 and older OR (4)  American aged 65 and older  Osteoporosis Screening: covered every 2 years if you meet one of the following conditions: (1) You're estrogen deficient and at risk for osteoporosis based off medical history and other findings; (2) Have a vertebral abnormality; (3) On glucocorticoid therapy for more than 3 months; (4) Have primary hyperparathyroidism; (5) On osteoporosis medications and need to assess response to drug therapy.   Last bone density test (DXA Scan): 10/01/2024.  HIV Screening: covered annually if you're between the age of 15-65. Also covered annually if you are younger than 15 and older than 65 with risk factors for HIV infection. For pregnant patients, it is covered up to 3 times per pregnancy.    Immunizations:  Immunization Recommendations   Influenza Vaccine Annual influenza vaccination during flu season is  recommended for all persons aged >= 6 months who do not have contraindications   Pneumococcal Vaccine   * Pneumococcal conjugate vaccine = PCV13 (Prevnar 13), PCV15 (Vaxneuvance), PCV20 (Prevnar 20)  * Pneumococcal polysaccharide vaccine = PPSV23 (Pneumovax) Adults 19-65 yo with certain risk factors or if 65+ yo  If never received any pneumonia vaccine: recommend Prevnar 20 (PCV20)  Give PCV20 if previously received 1 dose of PCV13 or PPSV23   Hepatitis B Vaccine 3 dose series if at intermediate or high risk (ex: diabetes, end stage renal disease, liver disease)   Respiratory syncytial virus (RSV) Vaccine - COVERED BY MEDICARE PART D  * RSVPreF3 (Arexvy) CDC recommends that adults 60 years of age and older may receive a single dose of RSV vaccine using shared clinical decision-making (SCDM)   Tetanus (Td) Vaccine - COST NOT COVERED BY MEDICARE PART B Following completion of primary series, a booster dose should be given every 10 years to maintain immunity against tetanus. Td may also be given as tetanus wound prophylaxis.   Tdap Vaccine - COST NOT COVERED BY MEDICARE PART B Recommended at least once for all adults. For pregnant patients, recommended with each pregnancy.   Shingles Vaccine (Shingrix) - COST NOT COVERED BY MEDICARE PART B  2 shot series recommended in those 19 years and older who have or will have weakened immune systems or those 50 years and older     Health Maintenance Due:      Topic Date Due   • Breast Cancer Screening: Mammogram  04/13/2025   • Hepatitis C Screening  Completed     Immunizations Due:      Topic Date Due   • Influenza Vaccine (1) 09/01/2024   • COVID-19 Vaccine (6 - 2023-24 season) 09/01/2024     Advance Directives   What are advance directives?  Advance directives are legal documents that state your wishes and plans for medical care. These plans are made ahead of time in case you lose your ability to make decisions for yourself. Advance directives can apply to any medical  decision, such as the treatments you want, and if you want to donate organs.   What are the types of advance directives?  There are many types of advance directives, and each state has rules about how to use them. You may choose a combination of any of the following:  Living will:  This is a written record of the treatment you want. You can also choose which treatments you do not want, which to limit, and which to stop at a certain time. This includes surgery, medicine, IV fluid, and tube feedings.   Durable power of  for healthcare (DPAHC):  This is a written record that states who you want to make healthcare choices for you when you are unable to make them for yourself. This person, called a proxy, is usually a family member or a friend. You may choose more than 1 proxy.  Do not resuscitate (DNR) order:  A DNR order is used in case your heart stops beating or you stop breathing. It is a request not to have certain forms of treatment, such as CPR. A DNR order may be included in other types of advance directives.  Medical directive:  This covers the care that you want if you are in a coma, near death, or unable to make decisions for yourself. You can list the treatments you want for each condition. Treatment may include pain medicine, surgery, blood transfusions, dialysis, IV or tube feedings, and a ventilator (breathing machine).  Values history:  This document has questions about your views, beliefs, and how you feel and think about life. This information can help others choose the care that you would choose.  Why are advance directives important?  An advance directive helps you control your care. Although spoken wishes may be used, it is better to have your wishes written down. Spoken wishes can be misunderstood, or not followed. Treatments may be given even if you do not want them. An advance directive may make it easier for your family to make difficult choices about your care.   Weight Management   Why  it is important to manage your weight:  Being overweight increases your risk of health conditions such as heart disease, high blood pressure, type 2 diabetes, and certain types of cancer. It can also increase your risk for osteoarthritis, sleep apnea, and other respiratory problems. Aim for a slow, steady weight loss. Even a small amount of weight loss can lower your risk of health problems.  How to lose weight safely:  A safe and healthy way to lose weight is to eat fewer calories and get regular exercise. You can lose up about 1 pound a week by decreasing the number of calories you eat by 500 calories each day.   Healthy meal plan for weight management:  A healthy meal plan includes a variety of foods, contains fewer calories, and helps you stay healthy. A healthy meal plan includes the following:  Eat whole-grain foods more often.  A healthy meal plan should contain fiber. Fiber is the part of grains, fruits, and vegetables that is not broken down by your body. Whole-grain foods are healthy and provide extra fiber in your diet. Some examples of whole-grain foods are whole-wheat breads and pastas, oatmeal, brown rice, and bulgur.  Eat a variety of vegetables every day.  Include dark, leafy greens such as spinach, kale, jeff greens, and mustard greens. Eat yellow and orange vegetables such as carrots, sweet potatoes, and winter squash.   Eat a variety of fruits every day.  Choose fresh or canned fruit (canned in its own juice or light syrup) instead of juice. Fruit juice has very little or no fiber.  Eat low-fat dairy foods.  Drink fat-free (skim) milk or 1% milk. Eat fat-free yogurt and low-fat cottage cheese. Try low-fat cheeses such as mozzarella and other reduced-fat cheeses.  Choose meat and other protein foods that are low in fat.  Choose beans or other legumes such as split peas or lentils. Choose fish, skinless poultry (chicken or turkey), or lean cuts of red meat (beef or pork). Before you cook meat or  poultry, cut off any visible fat.   Use less fat and oil.  Try baking foods instead of frying them. Add less fat, such as margarine, sour cream, regular salad dressing and mayonnaise to foods. Eat fewer high-fat foods. Some examples of high-fat foods include french fries, doughnuts, ice cream, and cakes.  Eat fewer sweets.  Limit foods and drinks that are high in sugar. This includes candy, cookies, regular soda, and sweetened drinks.  Exercise:  Exercise at least 30 minutes per day on most days of the week. Some examples of exercise include walking, biking, dancing, and swimming. You can also fit in more physical activity by taking the stairs instead of the elevator or parking farther away from stores. Ask your healthcare provider about the best exercise plan for you.      © Copyright [employer] 2018 Information is for End User's use only and may not be sold, redistributed or otherwise used for commercial purposes. All illustrations and images included in CareNotes® are the copyrighted property of [employer] Inc[employer] or [employer] ([employer])

## 2024-10-16 NOTE — PROGRESS NOTES
AURELIANO DUNLAP received a referral from patient's PCP regarding need for assistance with food resources. AURELIANO DUNLAP contacted patient at this time to discuss. Patient states she used to receive a small amount in SNAP but recently got off of the program as she felt there are other people who are in more need of assistance. Patient is interested in information on local food [employer]. AURELIANO DUNLAP utilized [employer] to send pt resources for food. Pt denies having additional concerns. Referral will be closed,however, AURELIANO DUNLAP will be available if needed,via new order.

## 2024-10-16 NOTE — ASSESSMENT & PLAN NOTE
At goal today.  Encouraged at home monitoring and contact office if persistently greater than 140/90.  Continue Hyzaar.       
Continue conservative management.  Contact office with any worsening.       
Continue regular follow-up with heme/onc.       
No seizures since last visit.  Continue Keppra.  Contact office if seizure recurs.       
Referred to social work care management.  Orders:    Ambulatory Referral to Social Work Care Management Program; Future    
Stable on conservative management.  Contact office if worsening.       
Stable.  Contact office with worsening.       
Stable.  Denies SI/HI.  Contact office with any worsening.       
Will continue to monitor.  Continue simvastatin.  Explained ASCVD risk score and potential need for increasing statin.  Orders:    Lipid panel; Future    
Will continue to monitor.  Educated on healthy diet and activity.  Orders:    Comprehensive metabolic panel; Future    Hemoglobin A1C; Future    Lipid panel; Future    CBC and differential; Future    
PAST MEDICAL HISTORY:  Afib     CKD (chronic kidney disease)     Diabetes type 2, controlled     HTN (hypertension)     Multiple myeloma

## 2024-12-24 DIAGNOSIS — F39 MOOD DISORDER (HCC): ICD-10-CM

## 2024-12-24 DIAGNOSIS — G40.909 SEIZURE DISORDER (HCC): ICD-10-CM

## 2024-12-24 RX ORDER — LEVETIRACETAM 1000 MG/1
1000 TABLET ORAL 2 TIMES DAILY
Qty: 180 TABLET | Refills: 1 | Status: SHIPPED | OUTPATIENT
Start: 2024-12-24

## 2024-12-24 RX ORDER — VENLAFAXINE HYDROCHLORIDE 75 MG/1
75 CAPSULE, EXTENDED RELEASE ORAL DAILY
Qty: 90 CAPSULE | Refills: 1 | Status: SHIPPED | OUTPATIENT
Start: 2024-12-24

## 2024-12-26 NOTE — TELEPHONE ENCOUNTER
Patient called to request medication -Effexor  Confirmed - E-Prescribing Status: Receipt confirmed by pharmacy (12/24/2024  4:01 PM EST) At [facility]

## 2024-12-27 ENCOUNTER — HOSPITAL ENCOUNTER (OUTPATIENT)
Facility: CLINIC | Age: 74
Discharge: HOME | End: 2024-12-27
Payer: COMMERCIAL

## 2024-12-27 DIAGNOSIS — I73.9 CLAUDICATION (HCC): ICD-10-CM

## 2024-12-27 PROCEDURE — 93923 UPR/LXTR ART STDY 3+ LVLS: CPT

## 2024-12-27 PROCEDURE — 93925 LOWER EXTREMITY STUDY: CPT

## 2024-12-27 PROCEDURE — 93925 LOWER EXTREMITY STUDY: CPT | Performed by: SURGERY

## 2024-12-27 PROCEDURE — 93922 UPR/L XTREMITY ART 2 LEVELS: CPT | Performed by: SURGERY

## 2024-12-30 ENCOUNTER — RESULTS FOLLOW-UP (OUTPATIENT)
Dept: FAMILY MEDICINE CLINIC | Facility: CLINIC | Age: 74
End: 2024-12-30

## 2025-01-03 DIAGNOSIS — K21.9 GASTROESOPHAGEAL REFLUX DISEASE WITHOUT ESOPHAGITIS: ICD-10-CM

## 2025-01-29 ENCOUNTER — RX ONLY (RX ONLY)
Age: 75
End: 2025-01-29

## 2025-01-29 RX ORDER — CYCLOSPORINE 0.5 MG/ML
EMULSION OPHTHALMIC
Qty: 60 | Refills: 5 | Status: ACTIVE | OUTPATIENT

## 2025-02-11 DIAGNOSIS — E11.9 TYPE 2 DIABETES MELLITUS WITHOUT COMPLICATION, WITHOUT LONG-TERM CURRENT USE OF INSULIN (HCC): ICD-10-CM

## 2025-02-12 RX ORDER — SIMVASTATIN 40 MG
40 TABLET ORAL
Qty: 100 TABLET | Refills: 1 | Status: SHIPPED | OUTPATIENT
Start: 2025-02-12

## 2025-03-02 DIAGNOSIS — I10 PRIMARY HYPERTENSION: ICD-10-CM

## 2025-03-03 RX ORDER — LOSARTAN POTASSIUM AND HYDROCHLOROTHIAZIDE 25; 100 MG/1; MG/1
1 TABLET ORAL DAILY
Qty: 90 TABLET | Refills: 1 | Status: SHIPPED | OUTPATIENT
Start: 2025-03-03

## 2025-04-11 ENCOUNTER — APPOINTMENT (OUTPATIENT)
Dept: LAB | Facility: CLINIC | Age: 75
End: 2025-04-11
Payer: MEDICARE

## 2025-04-11 DIAGNOSIS — E78.2 MIXED HYPERLIPIDEMIA: ICD-10-CM

## 2025-04-11 DIAGNOSIS — R73.03 PRE-DIABETES: ICD-10-CM

## 2025-04-11 LAB
ALBUMIN SERPL BCG-MCNC: 4.3 G/DL (ref 3.5–5)
ALP SERPL-CCNC: 95 U/L (ref 34–104)
ALT SERPL W P-5'-P-CCNC: 41 U/L (ref 7–52)
ANION GAP SERPL CALCULATED.3IONS-SCNC: 10 MMOL/L (ref 4–13)
AST SERPL W P-5'-P-CCNC: 32 U/L (ref 13–39)
BASOPHILS # BLD AUTO: 0.04 THOUSANDS/ÂΜL (ref 0–0.1)
BASOPHILS NFR BLD AUTO: 1 % (ref 0–1)
BILIRUB SERPL-MCNC: 0.36 MG/DL (ref 0.2–1)
BUN SERPL-MCNC: 23 MG/DL (ref 5–25)
CALCIUM SERPL-MCNC: 9.4 MG/DL (ref 8.4–10.2)
CHLORIDE SERPL-SCNC: 102 MMOL/L (ref 96–108)
CHOLEST SERPL-MCNC: 201 MG/DL (ref ?–200)
CO2 SERPL-SCNC: 27 MMOL/L (ref 21–32)
CREAT SERPL-MCNC: 0.68 MG/DL (ref 0.6–1.3)
EOSINOPHIL # BLD AUTO: 0.15 THOUSAND/ÂΜL (ref 0–0.61)
EOSINOPHIL NFR BLD AUTO: 3 % (ref 0–6)
ERYTHROCYTE [DISTWIDTH] IN BLOOD BY AUTOMATED COUNT: 13 % (ref 11.6–15.1)
EST. AVERAGE GLUCOSE BLD GHB EST-MCNC: 148 MG/DL
GFR SERPL CREATININE-BSD FRML MDRD: 85 ML/MIN/1.73SQ M
GLUCOSE P FAST SERPL-MCNC: 145 MG/DL (ref 65–99)
HBA1C MFR BLD: 6.8 %
HCT VFR BLD AUTO: 41.7 % (ref 34.8–46.1)
HDLC SERPL-MCNC: 57 MG/DL
HGB BLD-MCNC: 13.1 G/DL (ref 11.5–15.4)
IMM GRANULOCYTES # BLD AUTO: 0.06 THOUSAND/UL (ref 0–0.2)
IMM GRANULOCYTES NFR BLD AUTO: 1 % (ref 0–2)
LDLC SERPL CALC-MCNC: 102 MG/DL (ref 0–100)
LYMPHOCYTES # BLD AUTO: 1.91 THOUSANDS/ÂΜL (ref 0.6–4.47)
LYMPHOCYTES NFR BLD AUTO: 36 % (ref 14–44)
MCH RBC QN AUTO: 28.2 PG (ref 26.8–34.3)
MCHC RBC AUTO-ENTMCNC: 31.4 G/DL (ref 31.4–37.4)
MCV RBC AUTO: 90 FL (ref 82–98)
MONOCYTES # BLD AUTO: 0.35 THOUSAND/ÂΜL (ref 0.17–1.22)
MONOCYTES NFR BLD AUTO: 7 % (ref 4–12)
NEUTROPHILS # BLD AUTO: 2.86 THOUSANDS/ÂΜL (ref 1.85–7.62)
NEUTS SEG NFR BLD AUTO: 52 % (ref 43–75)
NONHDLC SERPL-MCNC: 144 MG/DL
NRBC BLD AUTO-RTO: 0 /100 WBCS
PLATELET # BLD AUTO: 273 THOUSANDS/UL (ref 149–390)
PMV BLD AUTO: 9.3 FL (ref 8.9–12.7)
POTASSIUM SERPL-SCNC: 3.9 MMOL/L (ref 3.5–5.3)
PROT SERPL-MCNC: 7.1 G/DL (ref 6.4–8.4)
RBC # BLD AUTO: 4.65 MILLION/UL (ref 3.81–5.12)
SODIUM SERPL-SCNC: 139 MMOL/L (ref 135–147)
TRIGL SERPL-MCNC: 212 MG/DL (ref ?–150)
WBC # BLD AUTO: 5.37 THOUSAND/UL (ref 4.31–10.16)

## 2025-04-11 PROCEDURE — 80061 LIPID PANEL: CPT

## 2025-04-11 PROCEDURE — 85025 COMPLETE CBC W/AUTO DIFF WBC: CPT

## 2025-04-11 PROCEDURE — 36415 COLL VENOUS BLD VENIPUNCTURE: CPT

## 2025-04-11 PROCEDURE — 83036 HEMOGLOBIN GLYCOSYLATED A1C: CPT

## 2025-04-11 PROCEDURE — 80053 COMPREHEN METABOLIC PANEL: CPT

## 2025-04-14 ENCOUNTER — RESULTS FOLLOW-UP (OUTPATIENT)
Dept: FAMILY MEDICINE CLINIC | Facility: CLINIC | Age: 75
End: 2025-04-14

## 2025-04-14 DIAGNOSIS — E78.2 MIXED HYPERLIPIDEMIA: Primary | ICD-10-CM

## 2025-04-14 RX ORDER — ROSUVASTATIN CALCIUM 20 MG/1
20 TABLET, COATED ORAL DAILY
Qty: 90 TABLET | Refills: 3 | Status: SHIPPED | OUTPATIENT
Start: 2025-04-14

## 2025-04-15 ENCOUNTER — RA CDI HCC (OUTPATIENT)
Dept: OTHER | Facility: HOSPITAL | Age: 75
End: 2025-04-15

## 2025-04-16 ENCOUNTER — OFFICE VISIT (OUTPATIENT)
Dept: FAMILY MEDICINE CLINIC | Facility: CLINIC | Age: 75
End: 2025-04-16
Payer: MEDICARE

## 2025-04-16 VITALS
DIASTOLIC BLOOD PRESSURE: 78 MMHG | OXYGEN SATURATION: 96 % | SYSTOLIC BLOOD PRESSURE: 142 MMHG | HEIGHT: 66 IN | HEART RATE: 62 BPM | WEIGHT: 200.2 LBS | BODY MASS INDEX: 32.17 KG/M2 | TEMPERATURE: 96.3 F

## 2025-04-16 DIAGNOSIS — G89.29 CHRONIC LEFT-SIDED LOW BACK PAIN WITH LEFT-SIDED SCIATICA: ICD-10-CM

## 2025-04-16 DIAGNOSIS — G89.29 CHRONIC LEFT HIP PAIN: ICD-10-CM

## 2025-04-16 DIAGNOSIS — C50.911 MALIGNANT NEOPLASM OF RIGHT BREAST IN FEMALE, ESTROGEN RECEPTOR POSITIVE, UNSPECIFIED SITE OF BREAST (HCC): ICD-10-CM

## 2025-04-16 DIAGNOSIS — R73.03 PRE-DIABETES: ICD-10-CM

## 2025-04-16 DIAGNOSIS — E78.2 MIXED HYPERLIPIDEMIA: ICD-10-CM

## 2025-04-16 DIAGNOSIS — J01.90 ACUTE NON-RECURRENT SINUSITIS, UNSPECIFIED LOCATION: ICD-10-CM

## 2025-04-16 DIAGNOSIS — R56.9 GENERALIZED CONVULSIVE SEIZURE (HCC): ICD-10-CM

## 2025-04-16 DIAGNOSIS — M54.42 CHRONIC LEFT-SIDED LOW BACK PAIN WITH LEFT-SIDED SCIATICA: ICD-10-CM

## 2025-04-16 DIAGNOSIS — M25.552 CHRONIC LEFT HIP PAIN: ICD-10-CM

## 2025-04-16 DIAGNOSIS — F39 MOOD DISORDER (HCC): ICD-10-CM

## 2025-04-16 DIAGNOSIS — Z17.0 MALIGNANT NEOPLASM OF RIGHT BREAST IN FEMALE, ESTROGEN RECEPTOR POSITIVE, UNSPECIFIED SITE OF BREAST (HCC): ICD-10-CM

## 2025-04-16 DIAGNOSIS — K21.9 GASTROESOPHAGEAL REFLUX DISEASE, UNSPECIFIED WHETHER ESOPHAGITIS PRESENT: ICD-10-CM

## 2025-04-16 DIAGNOSIS — I10 PRIMARY HYPERTENSION: Primary | ICD-10-CM

## 2025-04-16 DIAGNOSIS — Z12.31 ENCOUNTER FOR SCREENING MAMMOGRAM FOR BREAST CANCER: ICD-10-CM

## 2025-04-16 PROCEDURE — G2211 COMPLEX E/M VISIT ADD ON: HCPCS

## 2025-04-16 PROCEDURE — 99214 OFFICE O/P EST MOD 30 MIN: CPT

## 2025-04-16 RX ORDER — FLUTICASONE PROPIONATE 50 MCG
1 SPRAY, SUSPENSION (ML) NASAL DAILY
Qty: 9.9 ML | Refills: 0 | Status: SHIPPED | OUTPATIENT
Start: 2025-04-16

## 2025-04-16 NOTE — PROGRESS NOTES
Name: Micaela Hough      : 1950      MRN: [id number]  Encounter Provider: Adan Calvo PA-C  Encounter Date: 2025   Encounter department: [facility]  :  Assessment & Plan  Primary hypertension  Mild systolic elevation today.  Has typically been normal in the past.  Continue Hyzaar.  Encouraged to take at home blood pressures and contact office if persistently elevated.       Gastroesophageal reflux disease, unspecified whether esophagitis present  Stable on omeprazole.  No red flag signs/symptoms.  Contact office with worsening.       Mood disorder (HCC)  Stable.  Continue venlafaxine.  Contact office with worsening.       Malignant neoplasm of right breast in female, estrogen receptor positive, unspecified site of breast (HCC)  Follows with specialty.  Continue follow-up and plan with specialty, and will be getting diagnostic mammogram through specialty.       Generalized convulsive seizure (HCC)  Stable.  Continue levetiracetam.  No seizures since last office visit.  Go to ER if seizure occurs.       Mixed hyperlipidemia  Recently switched from simvastatin 40 mg to Crestor 20 mg.  ASCVD risk score 46.9%.  Discussed this with patient today.  Will continue to monitor lipid panel, and follow-up pending results.  Educated on healthy diet and activity.  Orders:    Lipid panel; Future    Pre-diabetes  Will continue to monitor.  Educated on healthy diet and activity.  Orders:    Hemoglobin A1C; Future    CBC and differential; Future    Comprehensive metabolic panel; Future    Chronic left-sided low back pain with left-sided sciatica  Will follow-up pending imaging results.  Orders:    XR spine lumbar minimum 4 views non injury; Future    Chronic left hip pain  Will follow-up pending imaging results.  Orders:    XR hip/pelv 2-3 vws left if performed; Future    Acute non-recurrent sinusitis, unspecified location  Will prescribe Flonase.  Continue Robitussin.  Recommend treatment  with over-the-counter and home remedies.  Discussed recommendation for nasal congestion.  For cough and chest congestion using Mucinex twice a day or honey as a natural cough suppressant.  For headaches, sinus pressure/pain can use Tylenol or ibuprofen.  For runny nose can use Flonase nasal spray, antihistamine nasal spray as well as Zyrtec/Allegra/Claritin.  Recommend hydration and adequate nutrition.  Discussed the anticipated course of viral upper respiratory infection.  Patient to follow-up if symptoms worsen or do not improve as discussed.    Orders:    fluticasone (FLONASE) 50 mcg/act nasal spray; 1 spray into each nostril daily    Encounter for screening mammogram for breast cancer  Monitored by specialty              History of Present Illness   Patient is a 74-year-old female presenting for follow-up.  Has been having chronic left hip pain.  Exacerbated when standing up and moving after being seated for an extended period of time.  Does not shoot down the leg.  Denies back pain, trauma.  Does have history of degenerative changes of the lumbar spine on x-ray from 2020.  Also has congestion, postnasal drip, and cough for 3 weeks.  Has been improving.  Using Robitussin.  Denies SOB, wheezing, abdominal symptoms, urinary symptoms, fever, chills.      Review of Systems   Constitutional:  Negative for appetite change, chills, diaphoresis, fatigue and fever.   HENT:  Positive for congestion, postnasal drip and rhinorrhea. Negative for ear discharge, ear pain, sinus pressure, sinus pain, sneezing and sore throat.    Eyes:  Negative for pain, discharge, redness, itching and visual disturbance.   Respiratory:  Positive for cough. Negative for apnea, chest tightness, shortness of breath and wheezing.    Cardiovascular:  Negative for chest pain, palpitations and leg swelling.   Gastrointestinal:  Negative for abdominal pain, blood in stool, constipation, diarrhea, nausea and vomiting.   Endocrine: Negative for cold  "intolerance, heat intolerance, polydipsia and polyuria.   Genitourinary:  Negative for dysuria, flank pain, frequency, hematuria and urgency.   Musculoskeletal:  Positive for arthralgias and back pain. Negative for myalgias, neck pain and neck stiffness.   Skin:  Negative for color change and rash.   Allergic/Immunologic: Negative for environmental allergies and food allergies.   Neurological:  Negative for dizziness, tremors, seizures, syncope, speech difficulty, weakness, light-headedness, numbness and headaches.   Hematological:  Negative for adenopathy. Does not bruise/bleed easily.   Psychiatric/Behavioral:  Negative for agitation, confusion, decreased concentration, dysphoric mood, hallucinations, self-injury, sleep disturbance and suicidal ideas. The patient is not nervous/anxious and is not hyperactive.    All other systems reviewed and are negative.      Objective   /78 (BP Location: Left arm, Patient Position: Sitting)   Pulse 62   Temp (!) 96.3 °F (35.7 °C) (Tympanic)   Ht 5' 6\" (1.676 m)   Wt 90.8 kg (200 lb 3.2 oz)   SpO2 96%   BMI 32.31 kg/m²      Physical Exam  Vitals and nursing note reviewed.   Constitutional:       General: She is not in acute distress.     Appearance: Normal appearance. She is well-developed. She is obese. She is not ill-appearing, toxic-appearing or diaphoretic.   HENT:      Head: Normocephalic and atraumatic.      Right Ear: Tympanic membrane normal.      Left Ear: Tympanic membrane normal.      Nose: Nose normal.      Mouth/Throat:      Mouth: Mucous membranes are moist.      Pharynx: Oropharynx is clear.   Eyes:      Extraocular Movements: Extraocular movements intact.      Conjunctiva/sclera: Conjunctivae normal.      Pupils: Pupils are equal, round, and reactive to light.   Neck:      Vascular: No carotid bruit.   Cardiovascular:      Rate and Rhythm: Normal rate and regular rhythm.      Pulses: Normal pulses.      Heart sounds: Normal heart sounds. No murmur " heard.  Pulmonary:      Effort: Pulmonary effort is normal. No respiratory distress.      Breath sounds: Normal breath sounds. No wheezing.   Chest:      Chest wall: No tenderness.   Abdominal:      General: Bowel sounds are normal.      Palpations: Abdomen is soft. There is no mass.      Tenderness: There is no abdominal tenderness.   Musculoskeletal:         General: Tenderness present. No swelling.      Cervical back: Normal range of motion and neck supple. No tenderness.      Right lower leg: No edema.      Left lower leg: No edema.   Lymphadenopathy:      Cervical: No cervical adenopathy.   Skin:     General: Skin is warm and dry.      Capillary Refill: Capillary refill takes less than 2 seconds.      Findings: No erythema, lesion or rash.   Neurological:      General: No focal deficit present.      Mental Status: She is alert and oriented to person, place, and time. Mental status is at baseline.      Motor: No weakness.      Coordination: Coordination normal.      Gait: Gait normal.   Psychiatric:         Mood and Affect: Mood normal.         Behavior: Behavior normal.         Thought Content: Thought content normal.         Judgment: Judgment normal.

## 2025-04-16 NOTE — ASSESSMENT & PLAN NOTE
Will continue to monitor.  Educated on healthy diet and activity.  Orders:    Hemoglobin A1C; Future    CBC and differential; Future    Comprehensive metabolic panel; Future

## 2025-04-16 NOTE — ASSESSMENT & PLAN NOTE
Follows with specialty.  Continue follow-up and plan with specialty, and will be getting diagnostic mammogram through specialty.

## 2025-04-16 NOTE — ASSESSMENT & PLAN NOTE
Mild systolic elevation today.  Has typically been normal in the past.  Continue Hyzaar.  Encouraged to take at home blood pressures and contact office if persistently elevated.

## 2025-04-16 NOTE — ASSESSMENT & PLAN NOTE
Stable.  Continue levetiracetam.  No seizures since last office visit.  Go to ER if seizure occurs.

## 2025-04-16 NOTE — ASSESSMENT & PLAN NOTE
Recently switched from simvastatin 40 mg to Crestor 20 mg.  ASCVD risk score 46.9%.  Discussed this with patient today.  Will continue to monitor lipid panel, and follow-up pending results.  Educated on healthy diet and activity.  Orders:    Lipid panel; Future

## 2025-04-25 ENCOUNTER — DOCTOR'S OFFICE (OUTPATIENT)
Dept: URBAN - NONMETROPOLITAN AREA CLINIC 1 | Facility: CLINIC | Age: 75
Setting detail: OPHTHALMOLOGY
End: 2025-04-25
Payer: MEDICARE

## 2025-04-25 VITALS — HEIGHT: 55 IN

## 2025-04-25 DIAGNOSIS — H35.363: ICD-10-CM

## 2025-04-25 DIAGNOSIS — H04.121: ICD-10-CM

## 2025-04-25 DIAGNOSIS — H35.40: ICD-10-CM

## 2025-04-25 DIAGNOSIS — H02.831: ICD-10-CM

## 2025-04-25 DIAGNOSIS — H02.403: ICD-10-CM

## 2025-04-25 DIAGNOSIS — Z96.1: ICD-10-CM

## 2025-04-25 DIAGNOSIS — H04.122: ICD-10-CM

## 2025-04-25 DIAGNOSIS — H43.813: ICD-10-CM

## 2025-04-25 DIAGNOSIS — H35.373: ICD-10-CM

## 2025-04-25 DIAGNOSIS — H02.834: ICD-10-CM

## 2025-04-25 PROCEDURE — 92134 CPTRZ OPH DX IMG PST SGM RTA: CPT | Performed by: OPHTHALMOLOGY

## 2025-04-25 PROCEDURE — 92014 COMPRE OPH EXAM EST PT 1/>: CPT | Performed by: OPHTHALMOLOGY

## 2025-04-25 ASSESSMENT — KERATOMETRY
OS_K1POWER_DIOPTERS: 46.25
OD_K2POWER_DIOPTERS: 48.00
OS_K2POWER_DIOPTERS: 49.00
OD_K1POWER_DIOPTERS: 45.75
OS_AXISANGLE_DEGREES: 114
OD_AXISANGLE_DEGREES: 096

## 2025-04-25 ASSESSMENT — LID POSITION - PTOSIS
OD_PTOSIS: RUL T
OS_PTOSIS: LUL T

## 2025-04-25 ASSESSMENT — REFRACTION_CURRENTRX
OD_VPRISM_DIRECTION: TRF
OS_AXIS: 025
OD_OVR_VA: 20/
OS_CYLINDER: -1.00
OD_AXIS: 004
OS_VPRISM_DIRECTION: TRF
OS_ADD: +2.75
OD_CYLINDER: -1.25
OS_OVR_VA: 20/
OS_SPHERE: +0.25
OD_SPHERE: +0.25
OD_ADD: +2.75

## 2025-04-25 ASSESSMENT — REFRACTION_AUTOREFRACTION
OD_CYLINDER: -2.25
OD_SPHERE: +0.50
OS_AXIS: 008
OD_AXIS: 006
OS_SPHERE: +0.75
OS_CYLINDER: -0.75

## 2025-04-25 ASSESSMENT — VISUAL ACUITY
OS_BCVA: 20/20
OD_BCVA: 20/20

## 2025-04-25 ASSESSMENT — LID EXAM ASSESSMENTS
OD_BLEPHARITIS: ABSENT
OS_BLEPHARITIS: ABSENT

## 2025-04-25 ASSESSMENT — REFRACTION_MANIFEST
OS_CYLINDER: -1.25
OD_CYLINDER: -1.25
OD_ADD: +2.50
OS_ADD: +2.50
OS_SPHERE: +0.50
OS_VA1: 20/20
OS_VA2: 20/25
OD_SPHERE: +0.25
OU_VA: 20/20
OD_VA1: 20/20
OD_AXIS: 180
OS_AXIS: 025
OD_VA2: 20/25

## 2025-04-25 ASSESSMENT — DRY EYES - PHYSICIAN NOTES
OD_GENERALCOMMENTS: TRACE PEE
OS_GENERALCOMMENTS: TRACE PEE

## 2025-04-25 ASSESSMENT — LID POSITION - DERMATOCHALASIS
OS_DERMATOCHALASIS: LUL 2+ 3+
OD_DERMATOCHALASIS: RUL 2+ 3+

## 2025-04-25 ASSESSMENT — CONFRONTATIONAL VISUAL FIELD TEST (CVF)
OS_FINDINGS: FULL
OD_FINDINGS: FULL

## 2025-04-25 ASSESSMENT — PUNCTA - ASSESSMENT: OD_PUNCTA: SIL PLUG RLL SMALL

## 2025-06-17 DIAGNOSIS — F39 MOOD DISORDER (HCC): ICD-10-CM

## 2025-06-17 RX ORDER — VENLAFAXINE HYDROCHLORIDE 75 MG/1
75 CAPSULE, EXTENDED RELEASE ORAL DAILY
Qty: 90 CAPSULE | Refills: 0 | Status: SHIPPED | OUTPATIENT
Start: 2025-06-17 | End: 2025-06-23 | Stop reason: SDUPTHER

## 2025-06-23 DIAGNOSIS — E78.2 MIXED HYPERLIPIDEMIA: ICD-10-CM

## 2025-06-23 DIAGNOSIS — F39 MOOD DISORDER (HCC): ICD-10-CM

## 2025-06-23 RX ORDER — VENLAFAXINE HYDROCHLORIDE 75 MG/1
75 CAPSULE, EXTENDED RELEASE ORAL DAILY
Qty: 90 CAPSULE | Refills: 1 | Status: SHIPPED | OUTPATIENT
Start: 2025-06-23

## 2025-06-23 RX ORDER — ROSUVASTATIN CALCIUM 20 MG/1
20 TABLET, COATED ORAL DAILY
Qty: 90 TABLET | Refills: 1 | Status: SHIPPED | OUTPATIENT
Start: 2025-06-23

## 2025-06-28 DIAGNOSIS — K21.9 GASTROESOPHAGEAL REFLUX DISEASE WITHOUT ESOPHAGITIS: ICD-10-CM

## 2025-07-01 RX ORDER — OMEPRAZOLE 20 MG/1
20 CAPSULE, DELAYED RELEASE ORAL DAILY
Qty: 90 CAPSULE | Refills: 1 | Status: SHIPPED | OUTPATIENT
Start: 2025-07-01

## 2025-08-05 DIAGNOSIS — J01.90 ACUTE NON-RECURRENT SINUSITIS, UNSPECIFIED LOCATION: ICD-10-CM

## 2025-08-05 DIAGNOSIS — I10 PRIMARY HYPERTENSION: ICD-10-CM

## 2025-08-05 DIAGNOSIS — K21.9 GASTROESOPHAGEAL REFLUX DISEASE WITHOUT ESOPHAGITIS: ICD-10-CM

## 2025-08-07 RX ORDER — OMEPRAZOLE 20 MG/1
20 CAPSULE, DELAYED RELEASE ORAL DAILY
Qty: 90 CAPSULE | Refills: 1 | Status: SHIPPED | OUTPATIENT
Start: 2025-08-07

## 2025-08-07 RX ORDER — LOSARTAN POTASSIUM AND HYDROCHLOROTHIAZIDE 25; 100 MG/1; MG/1
1 TABLET ORAL DAILY
Qty: 90 TABLET | Refills: 1 | Status: SHIPPED | OUTPATIENT
Start: 2025-08-07

## 2025-08-07 RX ORDER — FLUTICASONE PROPIONATE 50 MCG
1 SPRAY, SUSPENSION (ML) NASAL DAILY
Qty: 16 ML | Refills: 0 | Status: SHIPPED | OUTPATIENT
Start: 2025-08-07